# Patient Record
Sex: FEMALE | Race: WHITE | NOT HISPANIC OR LATINO | Employment: OTHER | URBAN - METROPOLITAN AREA
[De-identification: names, ages, dates, MRNs, and addresses within clinical notes are randomized per-mention and may not be internally consistent; named-entity substitution may affect disease eponyms.]

---

## 2018-01-09 ENCOUNTER — OFFICE VISIT CONVERTED (OUTPATIENT)
Dept: FAMILY MEDICINE CLINIC | Facility: CLINIC | Age: 63
End: 2018-01-09
Attending: NURSE PRACTITIONER

## 2018-02-12 ENCOUNTER — CONVERSION ENCOUNTER (OUTPATIENT)
Dept: FAMILY MEDICINE CLINIC | Facility: CLINIC | Age: 63
End: 2018-02-12

## 2018-02-12 ENCOUNTER — OFFICE VISIT CONVERTED (OUTPATIENT)
Dept: FAMILY MEDICINE CLINIC | Facility: CLINIC | Age: 63
End: 2018-02-12
Attending: FAMILY MEDICINE

## 2018-03-21 ENCOUNTER — CONVERSION ENCOUNTER (OUTPATIENT)
Dept: GENERAL RADIOLOGY | Facility: HOSPITAL | Age: 63
End: 2018-03-21

## 2018-07-03 ENCOUNTER — CONVERSION ENCOUNTER (OUTPATIENT)
Dept: FAMILY MEDICINE CLINIC | Facility: CLINIC | Age: 63
End: 2018-07-03

## 2018-07-03 ENCOUNTER — OFFICE VISIT CONVERTED (OUTPATIENT)
Dept: FAMILY MEDICINE CLINIC | Facility: CLINIC | Age: 63
End: 2018-07-03
Attending: FAMILY MEDICINE

## 2018-07-17 ENCOUNTER — OFFICE VISIT CONVERTED (OUTPATIENT)
Dept: FAMILY MEDICINE CLINIC | Facility: CLINIC | Age: 63
End: 2018-07-17
Attending: FAMILY MEDICINE

## 2018-07-23 ENCOUNTER — OFFICE VISIT CONVERTED (OUTPATIENT)
Dept: NEUROLOGY | Facility: CLINIC | Age: 63
End: 2018-07-23
Attending: PSYCHIATRY & NEUROLOGY

## 2018-10-15 ENCOUNTER — OFFICE VISIT CONVERTED (OUTPATIENT)
Dept: FAMILY MEDICINE CLINIC | Facility: CLINIC | Age: 63
End: 2018-10-15
Attending: FAMILY MEDICINE

## 2018-12-10 ENCOUNTER — OFFICE VISIT CONVERTED (OUTPATIENT)
Dept: FAMILY MEDICINE CLINIC | Facility: CLINIC | Age: 63
End: 2018-12-10
Attending: FAMILY MEDICINE

## 2019-02-12 ENCOUNTER — HOSPITAL ENCOUNTER (OUTPATIENT)
Dept: GENERAL RADIOLOGY | Facility: HOSPITAL | Age: 64
Discharge: HOME | End: 2019-02-12
Attending: FAMILY MEDICINE

## 2019-02-12 ENCOUNTER — OFFICE VISIT CONVERTED (OUTPATIENT)
Dept: FAMILY MEDICINE CLINIC | Facility: CLINIC | Age: 64
End: 2019-02-12
Attending: FAMILY MEDICINE

## 2019-04-16 ENCOUNTER — OFFICE VISIT CONVERTED (OUTPATIENT)
Dept: FAMILY MEDICINE CLINIC | Facility: CLINIC | Age: 64
End: 2019-04-16
Attending: FAMILY MEDICINE

## 2019-04-16 ENCOUNTER — CONVERSION ENCOUNTER (OUTPATIENT)
Dept: FAMILY MEDICINE CLINIC | Facility: CLINIC | Age: 64
End: 2019-04-16

## 2019-05-14 ENCOUNTER — HOSPITAL ENCOUNTER (OUTPATIENT)
Dept: URGENT CARE | Facility: CLINIC | Age: 64
Discharge: HOME | End: 2019-05-14
Attending: NURSE PRACTITIONER

## 2019-07-16 ENCOUNTER — HOSPITAL ENCOUNTER (OUTPATIENT)
Dept: LAB | Facility: HOSPITAL | Age: 64
Discharge: HOME | End: 2019-07-16
Attending: FAMILY MEDICINE

## 2019-07-16 ENCOUNTER — CONVERSION ENCOUNTER (OUTPATIENT)
Dept: FAMILY MEDICINE CLINIC | Facility: CLINIC | Age: 64
End: 2019-07-16

## 2019-07-16 ENCOUNTER — OFFICE VISIT CONVERTED (OUTPATIENT)
Dept: FAMILY MEDICINE CLINIC | Facility: CLINIC | Age: 64
End: 2019-07-16
Attending: FAMILY MEDICINE

## 2019-07-16 LAB
ALBUMIN SERPL-MCNC: 4.1 G/DL (ref 3.5–5)
ALBUMIN/GLOB SERPL: 1.3 {RATIO} (ref 1.4–2.6)
ALP SERPL-CCNC: 64 U/L (ref 43–160)
ALT SERPL-CCNC: 16 U/L (ref 10–40)
ANION GAP SERPL CALC-SCNC: 14 MMOL/L (ref 8–19)
AST SERPL-CCNC: 19 U/L (ref 15–50)
BILIRUB SERPL-MCNC: 0.24 MG/DL (ref 0.2–1.3)
BUN SERPL-MCNC: 16 MG/DL (ref 5–25)
BUN/CREAT SERPL: 16 {RATIO} (ref 6–20)
CALCIUM SERPL-MCNC: 9.2 MG/DL (ref 8.7–10.4)
CHLORIDE SERPL-SCNC: 102 MMOL/L (ref 99–111)
CONV CO2: 29 MMOL/L (ref 22–32)
CONV TOTAL PROTEIN: 7.2 G/DL (ref 6.3–8.2)
CREAT UR-MCNC: 1.01 MG/DL (ref 0.5–0.9)
GFR SERPLBLD BASED ON 1.73 SQ M-ARVRAT: 58 ML/MIN/{1.73_M2}
GLOBULIN UR ELPH-MCNC: 3.1 G/DL (ref 2–3.5)
GLUCOSE SERPL-MCNC: 90 MG/DL (ref 65–99)
OSMOLALITY SERPL CALC.SUM OF ELEC: 293 MOSM/KG (ref 273–304)
POTASSIUM SERPL-SCNC: 4.3 MMOL/L (ref 3.5–5.3)
SODIUM SERPL-SCNC: 141 MMOL/L (ref 135–147)
TSH SERPL-ACNC: 0.91 M[IU]/L (ref 0.27–4.2)

## 2019-10-11 ENCOUNTER — OFFICE VISIT CONVERTED (OUTPATIENT)
Dept: FAMILY MEDICINE CLINIC | Facility: CLINIC | Age: 64
End: 2019-10-11
Attending: INTERNAL MEDICINE

## 2019-10-14 ENCOUNTER — CONVERSION ENCOUNTER (OUTPATIENT)
Dept: FAMILY MEDICINE CLINIC | Facility: CLINIC | Age: 64
End: 2019-10-14

## 2019-10-14 ENCOUNTER — OFFICE VISIT CONVERTED (OUTPATIENT)
Dept: FAMILY MEDICINE CLINIC | Facility: CLINIC | Age: 64
End: 2019-10-14
Attending: NURSE PRACTITIONER

## 2019-10-22 ENCOUNTER — OFFICE VISIT CONVERTED (OUTPATIENT)
Dept: FAMILY MEDICINE CLINIC | Facility: CLINIC | Age: 64
End: 2019-10-22
Attending: INTERNAL MEDICINE

## 2019-10-24 ENCOUNTER — HOSPITAL ENCOUNTER (OUTPATIENT)
Dept: LAB | Facility: HOSPITAL | Age: 64
Discharge: HOME | End: 2019-10-24
Attending: INTERNAL MEDICINE

## 2019-10-24 LAB
BASOPHILS # BLD AUTO: 0.04 10*3/UL (ref 0–0.2)
BASOPHILS NFR BLD AUTO: 0.4 % (ref 0–3)
CONV ABS IMM GRAN: 0.05 10*3/UL (ref 0–0.2)
CONV IMMATURE GRAN: 0.5 % (ref 0–1.8)
DEPRECATED RDW RBC AUTO: 47.8 FL (ref 36.4–46.3)
EOSINOPHIL # BLD AUTO: 0.01 10*3/UL (ref 0–0.7)
EOSINOPHIL # BLD AUTO: 0.1 % (ref 0–7)
ERYTHROCYTE [DISTWIDTH] IN BLOOD BY AUTOMATED COUNT: 13.1 % (ref 11.7–14.4)
FOLATE SERPL-MCNC: 5.5 NG/ML (ref 4.8–20)
HCT VFR BLD AUTO: 42.1 % (ref 37–47)
HGB BLD-MCNC: 13.3 G/DL (ref 12–16)
LYMPHOCYTES # BLD AUTO: 0.46 10*3/UL (ref 1–5)
LYMPHOCYTES NFR BLD AUTO: 4.9 % (ref 20–45)
MCH RBC QN AUTO: 31.7 PG (ref 27–31)
MCHC RBC AUTO-ENTMCNC: 31.6 G/DL (ref 33–37)
MCV RBC AUTO: 100.5 FL (ref 81–99)
MONOCYTES # BLD AUTO: 0.3 10*3/UL (ref 0.2–1.2)
MONOCYTES NFR BLD AUTO: 3.2 % (ref 3–10)
NEUTROPHILS # BLD AUTO: 8.62 10*3/UL (ref 2–8)
NEUTROPHILS NFR BLD AUTO: 90.9 % (ref 30–85)
NRBC CBCN: 0 % (ref 0–0.7)
PLATELET # BLD AUTO: 293 10*3/UL (ref 130–400)
PMV BLD AUTO: 9.9 FL (ref 9.4–12.3)
RBC # BLD AUTO: 4.19 10*6/UL (ref 4.2–5.4)
VIT B12 SERPL-MCNC: 355 PG/ML (ref 211–911)
WBC # BLD AUTO: 9.48 10*3/UL (ref 4.8–10.8)

## 2019-11-05 ENCOUNTER — CONVERSION ENCOUNTER (OUTPATIENT)
Dept: OTHER | Facility: HOSPITAL | Age: 64
End: 2019-11-05

## 2019-11-14 ENCOUNTER — CONVERSION ENCOUNTER (OUTPATIENT)
Dept: FAMILY MEDICINE CLINIC | Facility: CLINIC | Age: 64
End: 2019-11-14

## 2019-11-14 ENCOUNTER — OFFICE VISIT CONVERTED (OUTPATIENT)
Dept: FAMILY MEDICINE CLINIC | Facility: CLINIC | Age: 64
End: 2019-11-14
Attending: NURSE PRACTITIONER

## 2020-01-22 ENCOUNTER — HOSPITAL ENCOUNTER (OUTPATIENT)
Dept: LAB | Facility: HOSPITAL | Age: 65
Discharge: HOME | End: 2020-01-22
Attending: NURSE PRACTITIONER

## 2020-01-22 ENCOUNTER — OFFICE VISIT CONVERTED (OUTPATIENT)
Dept: FAMILY MEDICINE CLINIC | Facility: CLINIC | Age: 65
End: 2020-01-22
Attending: NURSE PRACTITIONER

## 2020-01-22 LAB
ALBUMIN SERPL-MCNC: 4.6 G/DL (ref 3.5–5)
ALBUMIN/GLOB SERPL: 1.4 {RATIO} (ref 1.4–2.6)
ALP SERPL-CCNC: 78 U/L (ref 43–160)
ALT SERPL-CCNC: 15 U/L (ref 10–40)
AMYLASE SERPL-CCNC: 38 U/L (ref 30–110)
ANION GAP SERPL CALC-SCNC: 20 MMOL/L (ref 8–19)
APPEARANCE UR: ABNORMAL
AST SERPL-CCNC: 21 U/L (ref 15–50)
BASOPHILS # BLD AUTO: 0.05 10*3/UL (ref 0–0.2)
BASOPHILS NFR BLD AUTO: 0.6 % (ref 0–3)
BILIRUB SERPL-MCNC: 0.55 MG/DL (ref 0.2–1.3)
BILIRUB UR QL: NEGATIVE
BUN SERPL-MCNC: 10 MG/DL (ref 5–25)
BUN/CREAT SERPL: 11 {RATIO} (ref 6–20)
CALCIUM SERPL-MCNC: 9.9 MG/DL (ref 8.7–10.4)
CASTS URNS QL MICRO: ABNORMAL /[LPF]
CHLORIDE SERPL-SCNC: 103 MMOL/L (ref 99–111)
CHOLEST SERPL-MCNC: 196 MG/DL (ref 107–200)
CHOLEST/HDLC SERPL: 3.3 {RATIO} (ref 3–6)
COLOR UR: ABNORMAL
CONV ABS IMM GRAN: 0.03 10*3/UL (ref 0–0.2)
CONV BACTERIA: ABNORMAL
CONV CO2: 25 MMOL/L (ref 22–32)
CONV COLLECTION SOURCE (UA): ABNORMAL
CONV CRYSTALS: ABNORMAL /[HPF]
CONV IMMATURE GRAN: 0.4 % (ref 0–1.8)
CONV TOTAL PROTEIN: 7.9 G/DL (ref 6.3–8.2)
CONV UROBILINOGEN IN URINE BY AUTOMATED TEST STRIP: 1 {EHRLICHU}/DL (ref 0.1–1)
CREAT UR-MCNC: 0.94 MG/DL (ref 0.5–0.9)
DEPRECATED RDW RBC AUTO: 47.6 FL (ref 36.4–46.3)
EOSINOPHIL # BLD AUTO: 0.05 10*3/UL (ref 0–0.7)
EOSINOPHIL # BLD AUTO: 0.6 % (ref 0–7)
ERYTHROCYTE [DISTWIDTH] IN BLOOD BY AUTOMATED COUNT: 13.2 % (ref 11.7–14.4)
GFR SERPLBLD BASED ON 1.73 SQ M-ARVRAT: >60 ML/MIN/{1.73_M2}
GLOBULIN UR ELPH-MCNC: 3.3 G/DL (ref 2–3.5)
GLUCOSE SERPL-MCNC: 104 MG/DL (ref 65–99)
GLUCOSE UR QL: NEGATIVE MG/DL
HCT VFR BLD AUTO: 44 % (ref 37–47)
HDLC SERPL-MCNC: 60 MG/DL (ref 40–60)
HGB BLD-MCNC: 14.3 G/DL (ref 12–16)
HGB UR QL STRIP: NEGATIVE
KETONES UR QL STRIP: NEGATIVE MG/DL
LDLC SERPL CALC-MCNC: 109 MG/DL (ref 70–100)
LEUKOCYTE ESTERASE UR QL STRIP: NEGATIVE
LIPASE SERPL-CCNC: 20 U/L (ref 5–51)
LYMPHOCYTES # BLD AUTO: 1.31 10*3/UL (ref 1–5)
LYMPHOCYTES NFR BLD AUTO: 16.8 % (ref 20–45)
MCH RBC QN AUTO: 31.8 PG (ref 27–31)
MCHC RBC AUTO-ENTMCNC: 32.5 G/DL (ref 33–37)
MCV RBC AUTO: 97.8 FL (ref 81–99)
MONOCYTES # BLD AUTO: 0.6 10*3/UL (ref 0.2–1.2)
MONOCYTES NFR BLD AUTO: 7.7 % (ref 3–10)
NEUTROPHILS # BLD AUTO: 5.75 10*3/UL (ref 2–8)
NEUTROPHILS NFR BLD AUTO: 73.9 % (ref 30–85)
NITRITE UR QL STRIP: NEGATIVE
NRBC CBCN: 0 % (ref 0–0.7)
OSMOLALITY SERPL CALC.SUM OF ELEC: 299 MOSM/KG (ref 273–304)
PH UR STRIP.AUTO: 5.5 [PH] (ref 5–8)
PLATELET # BLD AUTO: 251 10*3/UL (ref 130–400)
PMV BLD AUTO: 10.3 FL (ref 9.4–12.3)
POTASSIUM SERPL-SCNC: 3.2 MMOL/L (ref 3.5–5.3)
PROT UR QL: ABNORMAL MG/DL
RBC # BLD AUTO: 4.5 10*6/UL (ref 4.2–5.4)
RBC #/AREA URNS HPF: ABNORMAL /[HPF]
SODIUM SERPL-SCNC: 145 MMOL/L (ref 135–147)
SP GR UR: 1.02 (ref 1–1.03)
SQUAMOUS SPT QL MICRO: ABNORMAL /[HPF]
TRIGL SERPL-MCNC: 135 MG/DL (ref 40–150)
TSH SERPL-ACNC: 0.47 M[IU]/L (ref 0.27–4.2)
VLDLC SERPL-MCNC: 27 MG/DL (ref 5–37)
WBC # BLD AUTO: 7.79 10*3/UL (ref 4.8–10.8)
WBC #/AREA URNS HPF: ABNORMAL /[HPF]

## 2020-01-23 ENCOUNTER — HOSPITAL ENCOUNTER (OUTPATIENT)
Dept: LAB | Facility: HOSPITAL | Age: 65
Discharge: HOME | End: 2020-01-23
Attending: NURSE PRACTITIONER

## 2020-01-23 LAB
C DIFF TOX B STL QL CT TISS CULT: NEGATIVE
CONV 027 TOXIN: NEGATIVE
CONV HEPATITIS B SURFACE AG W CONFIRMATION RE: NEGATIVE
HAV IGM SERPL QL IA: NEGATIVE
HBV CORE IGM SERPL QL IA: NEGATIVE
HCV AB SER DONR QL: <0.1 S/CO RATIO (ref 0–0.9)

## 2020-01-25 LAB
BACTERIA SPEC AEROBE CULT: NORMAL
CONV CELIAC DISEASE AB-IGA: 230 MG/DL (ref 68–408)
TTG IGA SER-ACNC: 1 U/ML (ref 0–3)

## 2020-01-28 ENCOUNTER — HOSPITAL ENCOUNTER (OUTPATIENT)
Dept: CT IMAGING | Facility: HOSPITAL | Age: 65
Discharge: HOME | End: 2020-01-28
Attending: NURSE PRACTITIONER

## 2020-02-13 ENCOUNTER — HOSPITAL ENCOUNTER (OUTPATIENT)
Dept: FAMILY MEDICINE CLINIC | Facility: CLINIC | Age: 65
Discharge: HOME | End: 2020-02-13
Attending: NURSE PRACTITIONER

## 2020-02-15 LAB — BACTERIA UR CULT: NORMAL

## 2020-02-24 ENCOUNTER — CONVERSION ENCOUNTER (OUTPATIENT)
Dept: FAMILY MEDICINE CLINIC | Facility: CLINIC | Age: 65
End: 2020-02-24

## 2020-02-24 ENCOUNTER — OFFICE VISIT CONVERTED (OUTPATIENT)
Dept: FAMILY MEDICINE CLINIC | Facility: CLINIC | Age: 65
End: 2020-02-24
Attending: NURSE PRACTITIONER

## 2020-02-25 ENCOUNTER — OFFICE VISIT CONVERTED (OUTPATIENT)
Dept: SURGERY | Facility: CLINIC | Age: 65
End: 2020-02-25
Attending: NURSE PRACTITIONER

## 2020-02-25 ENCOUNTER — HOSPITAL ENCOUNTER (OUTPATIENT)
Dept: SURGERY | Facility: CLINIC | Age: 65
Discharge: HOME | End: 2020-02-25
Attending: NURSE PRACTITIONER

## 2020-02-25 LAB
APPEARANCE UR: ABNORMAL
BILIRUB UR QL: NEGATIVE
COLOR UR: ABNORMAL
CONV BACTERIA: ABNORMAL
CONV COLLECTION SOURCE (UA): ABNORMAL
CONV CRYSTALS: ABNORMAL /[HPF]
CONV HYALINE CASTS IN URINE MICRO: ABNORMAL /[LPF]
CONV UROBILINOGEN IN URINE BY AUTOMATED TEST STRIP: 1 {EHRLICHU}/DL (ref 0.1–1)
GLUCOSE UR QL: NEGATIVE MG/DL
HGB UR QL STRIP: NEGATIVE
KETONES UR QL STRIP: NEGATIVE MG/DL
LEUKOCYTE ESTERASE UR QL STRIP: ABNORMAL
NITRITE UR QL STRIP: NEGATIVE
PH UR STRIP.AUTO: 5.5 [PH] (ref 5–8)
PROT UR QL: NEGATIVE MG/DL
RBC #/AREA URNS HPF: ABNORMAL /[HPF]
SP GR UR: 1.02 (ref 1–1.03)
SQUAMOUS SPT QL MICRO: ABNORMAL /[HPF]
WBC #/AREA URNS HPF: ABNORMAL /[HPF]

## 2020-02-26 LAB — BACTERIA UR CULT: NORMAL

## 2020-03-12 ENCOUNTER — HOSPITAL ENCOUNTER (OUTPATIENT)
Dept: FAMILY MEDICINE CLINIC | Facility: CLINIC | Age: 65
Discharge: HOME | End: 2020-03-12
Attending: NURSE PRACTITIONER

## 2020-03-30 ENCOUNTER — TELEMEDICINE CONVERTED (OUTPATIENT)
Dept: FAMILY MEDICINE CLINIC | Facility: CLINIC | Age: 65
End: 2020-03-30
Attending: NURSE PRACTITIONER

## 2020-04-15 LAB — FUNGUS CSF CULT: NORMAL

## 2020-05-28 ENCOUNTER — PROCEDURE VISIT CONVERTED (OUTPATIENT)
Dept: UROLOGY | Facility: CLINIC | Age: 65
End: 2020-05-28
Attending: UROLOGY

## 2020-06-11 ENCOUNTER — HOSPITAL ENCOUNTER (OUTPATIENT)
Dept: GASTROENTEROLOGY | Facility: HOSPITAL | Age: 65
Setting detail: HOSPITAL OUTPATIENT SURGERY
Discharge: HOME | End: 2020-06-11
Attending: SURGERY

## 2020-06-12 LAB — SARS-COV-2 RNA SPEC QL NAA+PROBE: NOT DETECTED

## 2020-06-15 ENCOUNTER — HOSPITAL ENCOUNTER (OUTPATIENT)
Dept: GASTROENTEROLOGY | Facility: HOSPITAL | Age: 65
Setting detail: HOSPITAL OUTPATIENT SURGERY
Discharge: HOME | End: 2020-06-15
Attending: SURGERY

## 2020-07-08 ENCOUNTER — CONVERSION ENCOUNTER (OUTPATIENT)
Dept: FAMILY MEDICINE CLINIC | Facility: CLINIC | Age: 65
End: 2020-07-08

## 2020-07-08 ENCOUNTER — OFFICE VISIT CONVERTED (OUTPATIENT)
Dept: FAMILY MEDICINE CLINIC | Facility: CLINIC | Age: 65
End: 2020-07-08
Attending: NURSE PRACTITIONER

## 2020-10-29 ENCOUNTER — HOSPITAL ENCOUNTER (OUTPATIENT)
Dept: FAMILY MEDICINE CLINIC | Facility: CLINIC | Age: 65
Discharge: HOME | End: 2020-10-29
Attending: NURSE PRACTITIONER

## 2020-10-29 ENCOUNTER — OFFICE VISIT CONVERTED (OUTPATIENT)
Dept: FAMILY MEDICINE CLINIC | Facility: CLINIC | Age: 65
End: 2020-10-29
Attending: NURSE PRACTITIONER

## 2020-10-30 ENCOUNTER — HOSPITAL ENCOUNTER (OUTPATIENT)
Dept: LAB | Facility: HOSPITAL | Age: 65
Discharge: HOME | End: 2020-10-30
Attending: NURSE PRACTITIONER

## 2020-10-30 LAB
ALBUMIN SERPL-MCNC: 4.2 G/DL (ref 3.5–5)
ALBUMIN/GLOB SERPL: 1.3 {RATIO} (ref 1.4–2.6)
ALP SERPL-CCNC: 74 U/L (ref 43–160)
ALT SERPL-CCNC: 11 U/L (ref 10–40)
ANION GAP SERPL CALC-SCNC: 15 MMOL/L (ref 8–19)
AST SERPL-CCNC: 21 U/L (ref 15–50)
BASOPHILS # BLD AUTO: 0.05 10*3/UL (ref 0–0.2)
BASOPHILS NFR BLD AUTO: 0.7 % (ref 0–3)
BILIRUB SERPL-MCNC: 0.34 MG/DL (ref 0.2–1.3)
BUN SERPL-MCNC: 22 MG/DL (ref 5–25)
BUN/CREAT SERPL: 19 {RATIO} (ref 6–20)
CALCIUM SERPL-MCNC: 9.4 MG/DL (ref 8.7–10.4)
CHLORIDE SERPL-SCNC: 104 MMOL/L (ref 99–111)
CHOLEST SERPL-MCNC: 204 MG/DL (ref 107–200)
CHOLEST/HDLC SERPL: 2.3 {RATIO} (ref 3–6)
CONV ABS IMM GRAN: 0.03 10*3/UL (ref 0–0.2)
CONV CO2: 26 MMOL/L (ref 22–32)
CONV IMMATURE GRAN: 0.4 % (ref 0–1.8)
CONV TOTAL PROTEIN: 7.4 G/DL (ref 6.3–8.2)
CREAT UR-MCNC: 1.17 MG/DL (ref 0.5–0.9)
DEPRECATED RDW RBC AUTO: 47.8 FL (ref 36.4–46.3)
EOSINOPHIL # BLD AUTO: 0.1 10*3/UL (ref 0–0.7)
EOSINOPHIL # BLD AUTO: 1.4 % (ref 0–7)
ERYTHROCYTE [DISTWIDTH] IN BLOOD BY AUTOMATED COUNT: 13.1 % (ref 11.7–14.4)
GFR SERPLBLD BASED ON 1.73 SQ M-ARVRAT: 48 ML/MIN/{1.73_M2}
GLOBULIN UR ELPH-MCNC: 3.2 G/DL (ref 2–3.5)
GLUCOSE SERPL-MCNC: 87 MG/DL (ref 65–99)
HCT VFR BLD AUTO: 44.8 % (ref 37–47)
HDLC SERPL-MCNC: 87 MG/DL (ref 40–60)
HGB BLD-MCNC: 14.3 G/DL (ref 12–16)
LDLC SERPL CALC-MCNC: 89 MG/DL (ref 70–100)
LYMPHOCYTES # BLD AUTO: 1.03 10*3/UL (ref 1–5)
LYMPHOCYTES NFR BLD AUTO: 14.1 % (ref 20–45)
MCH RBC QN AUTO: 31.7 PG (ref 27–31)
MCHC RBC AUTO-ENTMCNC: 31.9 G/DL (ref 33–37)
MCV RBC AUTO: 99.3 FL (ref 81–99)
MONOCYTES # BLD AUTO: 0.52 10*3/UL (ref 0.2–1.2)
MONOCYTES NFR BLD AUTO: 7.1 % (ref 3–10)
NEUTROPHILS # BLD AUTO: 5.57 10*3/UL (ref 2–8)
NEUTROPHILS NFR BLD AUTO: 76.3 % (ref 30–85)
NRBC CBCN: 0 % (ref 0–0.7)
OSMOLALITY SERPL CALC.SUM OF ELEC: 295 MOSM/KG (ref 273–304)
PLATELET # BLD AUTO: 271 10*3/UL (ref 130–400)
PMV BLD AUTO: 9.2 FL (ref 9.4–12.3)
POTASSIUM SERPL-SCNC: 4.4 MMOL/L (ref 3.5–5.3)
RBC # BLD AUTO: 4.51 10*6/UL (ref 4.2–5.4)
SODIUM SERPL-SCNC: 141 MMOL/L (ref 135–147)
TRIGL SERPL-MCNC: 141 MG/DL (ref 40–150)
TSH SERPL-ACNC: 1.05 M[IU]/L (ref 0.27–4.2)
VLDLC SERPL-MCNC: 28 MG/DL (ref 5–37)
WBC # BLD AUTO: 7.3 10*3/UL (ref 4.8–10.8)

## 2020-10-31 LAB
25(OH)D3 SERPL-MCNC: 23.5 NG/ML (ref 30–100)
BACTERIA UR CULT: NORMAL
VIT B12 SERPL-MCNC: 331 PG/ML (ref 211–911)

## 2020-11-09 ENCOUNTER — HOSPITAL ENCOUNTER (OUTPATIENT)
Dept: FAMILY MEDICINE CLINIC | Facility: CLINIC | Age: 65
Discharge: HOME | End: 2020-11-09
Attending: NURSE PRACTITIONER

## 2020-11-11 LAB
AMPICILLIN SUSC ISLT: >=32
AMPICILLIN+SULBAC SUSC ISLT: 8
BACTERIA UR CULT: ABNORMAL
CEFAZOLIN SUSC ISLT: <=4
CEFEPIME SUSC ISLT: <=0.12
CEFTAZIDIME SUSC ISLT: <=1
CEFTRIAXONE SUSC ISLT: <=0.25
CIPROFLOXACIN SUSC ISLT: <=0.25
ERTAPENEM SUSC ISLT: <=0.12
GENTAMICIN SUSC ISLT: <=1
LEVOFLOXACIN SUSC ISLT: <=0.12
NITROFURANTOIN SUSC ISLT: 128
PIP+TAZO SUSC ISLT: <=4
TMP SMX SUSC ISLT: <=20
TOBRAMYCIN SUSC ISLT: <=1

## 2020-12-11 ENCOUNTER — HOSPITAL ENCOUNTER (OUTPATIENT)
Dept: FAMILY MEDICINE CLINIC | Facility: CLINIC | Age: 65
Discharge: HOME | End: 2020-12-11
Attending: NURSE PRACTITIONER

## 2020-12-11 LAB
APPEARANCE UR: ABNORMAL
BILIRUB UR QL: NEGATIVE
COLOR UR: YELLOW
CONV BACTERIA: NEGATIVE
CONV COLLECTION SOURCE (UA): ABNORMAL
CONV HYALINE CASTS IN URINE MICRO: ABNORMAL /[LPF]
CONV UROBILINOGEN IN URINE BY AUTOMATED TEST STRIP: 0.2 {EHRLICHU}/DL (ref 0.1–1)
GLUCOSE UR QL: NEGATIVE MG/DL
HGB UR QL STRIP: NEGATIVE
KETONES UR QL STRIP: NEGATIVE MG/DL
LEUKOCYTE ESTERASE UR QL STRIP: NEGATIVE
NITRITE UR QL STRIP: NEGATIVE
PH UR STRIP.AUTO: 6 [PH] (ref 5–8)
PROT UR QL: NEGATIVE MG/DL
RBC #/AREA URNS HPF: ABNORMAL /[HPF]
SP GR UR: 1.02 (ref 1–1.03)
SQUAMOUS SPT QL MICRO: ABNORMAL /[HPF]
WBC #/AREA URNS HPF: ABNORMAL /[HPF]

## 2020-12-13 LAB — BACTERIA UR CULT: NORMAL

## 2021-01-08 ENCOUNTER — CONVERSION ENCOUNTER (OUTPATIENT)
Dept: FAMILY MEDICINE CLINIC | Facility: CLINIC | Age: 66
End: 2021-01-08

## 2021-01-08 ENCOUNTER — OFFICE VISIT CONVERTED (OUTPATIENT)
Dept: FAMILY MEDICINE CLINIC | Facility: CLINIC | Age: 66
End: 2021-01-08
Attending: NURSE PRACTITIONER

## 2021-03-10 ENCOUNTER — HOSPITAL ENCOUNTER (OUTPATIENT)
Dept: VACCINE CLINIC | Facility: HOSPITAL | Age: 66
Discharge: HOME | End: 2021-03-10
Attending: INTERNAL MEDICINE

## 2021-04-01 ENCOUNTER — HOSPITAL ENCOUNTER (OUTPATIENT)
Dept: VACCINE CLINIC | Facility: HOSPITAL | Age: 66
Discharge: HOME | End: 2021-04-01
Attending: INTERNAL MEDICINE

## 2021-05-10 NOTE — PROCEDURES
Procedure Note      Patient Name: Marissa Rob   Patient ID: [id number]   Sex: Female   YOB: 1955    Primary Care Provider: Desmond Gaffney MD   Referring Provider: Jenni Contreras MD    Visit Date: May 28, 2020    Provider: Isabell Sanchez MD   Location: [facility]   Location Address: [address] [place] [address]  [id number]   Location Phone: [phone]          Cystoscopy Procedure:  PROCEDURE: Flexible cystoscope was passed per urethra into the bladder without difficulty after proper consent. The bladder was inspected in a systematic meridian fashion. There were no tumors, lesions, stones, or other abnormalities noted within the bladder except for some mild diffuse inflammation. Both ureteral orifices were identified and were normal in appearance. The flexible cystoscope was removed. The patient tolerated the procedure well.   FOLLOW UP OFFICE NOTE: The CT scan of the Abdomen/Pelvis was abnormal. and She had circumferential bladder wall thickening but no other abnormalities were seen.           Assessment  · Bladder problem     596.9/N32.9  · Microhematuria     599.72/R31.29      Plan  · Orders  o Cystoscopy (73853) - 599.72/R31.29 - 05/28/2020  · Instructions  o We will follow up in one year or sooner if needed.  o TIME OUT PROCEDURE: Correct patient and birth date; Correct procedure; Correct Physician; Consent signed  o Her workup for hematuria is negative other than mild thickened bladder but no tumors were seen on cystoscopy. We discussed her bladder symptoms which are likely due to cognitive decline as well as OAB. She has failed Myrbetriq 25 and 50mg. They do not want to try anticholinergics due to cognitive issues.             Electronically Signed by: Isabell Sanchez MD -Author on May 28, 2020 11:51:19 AM

## 2021-05-12 NOTE — PROGRESS NOTES
Progress Note      Patient Name: Marissa Rob   Patient ID: [id number]   Sex: Female   YOB: 1955    Primary Care Provider: Desmond Gaffney MD   Referring Provider: Jenni Contreras MD    Visit Date: March 30, 2020    Provider: AUDREY Bob   Location: [facility]   Location Address: [address] [place] [address], [address]  [place]  [id number]   Location Phone: [phone]          Chief Complaint  · F/U       History Of Present Illness  Video Conferencing Visit  Marissa Rob is a 64 year old /White female who is presenting for evaluation via video conferencing. Verbal consent obtained before beginning visit.   The following staff were present during this visit: Vita Walker MA and Rabia VENTURA   Marissa Rob is a 64 year old /White female who presents for evaluation and treatment of:      Patient is here on video call for a follow up on weight loss.    wt loss and decreased appetite-she saw Sara Cabello on 2/25/20-she scheduled an EGD/Colonoscopy on 5/4/20 @08:00am. Her mother has a hx of stomach cancer. Reports she is doing really well, her appetite has increased. Reports for lunch today she had a 500 calorie hamburger, fries and a coke. She has started snacking on cookines at time. States she mentally feels better. She seems to be more like herself, she is trying to help around the house doing laundry and cleaning. She performed a U/A at the visit as well which showed hematuria-she is scheduled for a cysto on 4/10/20 @09:30am    lung nodules-she has a consult with pulm on 6/11/20 @ 09:45am to discuss the pulmonary nodules found on CT. She does have a hx of pulmonary nodules but does not currently have pulmonary on board.    urinary freq-last visit gave samples of Mybertriq.  reports it has helped w/the frequency-she is now getting up twice nightly which is an improvement.    htn-reports b/p running around 130/60-65. He has been giving her 20mg  lisinopril. He is also giving her 1/2 Norvasc 5mg qd.     diarrhea-reports is has completely stopped about 6 wks ago-she had it for a year. The Lomotil was lifechanging for her. She is taking 2 tabs bid. Requests refill today.     25 min spent via [facility] on [employer]       Past Medical History  Disease Name Date Onset Notes   Arthritis --  --    Bladder problem --  --    Bone Density Screening REFUSED --    Cerebrovascular disease 06/11/2015 --    Dementia 07/23/2018 The patient reports a 3 year history of memory change. Since that time, her  notes that she has progressively worsened. She did have neuropsych testing back in 2015. I did review these today. She was noted to have evidence to suggest a vascular form of memory change. In addition, she was felt to have clinical depression. She was started on Aricept. This was recently switched to memantine. She is uncertain in regards to the current dose. Today, I will pursue a MOCA. I will check labs. I will request her records be sent for my review. I did personally review her MRI of the brain which did reveal a few scattered FLAIR hyperintensities. However, overall, this is relatively mild. I would be reluctant to attribute her memory issues to this. She is noted have atrophy of the bitemporal region was for suggest a component of Alzheimer's dementia. I will refer the patient for repeat neuropsych testing. I will titrate her memory and seen to 10 mg twice per day.   Hepatitis A --  --    Hypertension --  --    Hypomagnesemia 01/11/2016 --    Hypothyroidism 05/09/2015 --    Leg pain --  --    Medicare Annual Wellness Visit REFUSED --    Memory loss 05/09/2015 --    Muscle cramps --  --    Obsessive-compulsive disorders --  --    Postmenopausal syndrome 02/01/2017 --    Thyroid disease --  --    Vascular dementia 08/10/2015 --    Vascular disease, peripheral --  --    Vitamin D deficiency --  --          Past Surgical History  Procedure Name Date Notes   Back  surgery --  --    Bladder Repair 2000 --    Hysterectomy --  Partial   Laminectomy 1989 L5-S1   Tubal ligation 1979 --          Medication List  Name Date Started Instructions   amlodipine 5 mg oral tablet  take 1 tablet (5 mg) by oral route once daily   Aricept 23 mg oral tablet 03/30/2020 take 1 tablet (23 mg) by oral route once daily in the evening for 90 days   Benadryl 25 mg oral capsule 01/26/2020 take 2 capsules (50 mg) by oral route one hour prior to the procedure.   estradiol 0.025 mg/24 hr transdermal patch weekly 09/09/2019 apply 1 patch by transdermal route once weekly for 90 days   levothyroxine 50 mcg oral tablet 12/30/2019 TAKE ONE TABLET BY MOUTH DAILY   lisinopril 20 mg oral tablet 03/30/2020 take 1 tablet (20 mg) by oral route once daily for 90 days   Lomotil 2.5-0.025 mg oral tablet 04/02/2020 take 2 tablets (5 mg) by oral route 2 times per day   Namenda 5 mg oral tablet 11/14/2019 take 1 tablets (5 mg) by oral route once a day for a week, then 2 times per day if tolerated   prednisone 50 mg oral tablet 01/27/2020 take 1 tablet (50 mg) by oral route 13 hours prior to procedure then again 7 hours before procedure then again 1 hour prior to procedure.         Allergy List  Allergen Name Date Reaction Notes   Allergic to IV contrast --  rash and itching --    cefuroxime axetil --  --  --    Levaquin --  joint pain --    Pravachol --  muscle aches and cramping --          Family Medical History  Disease Name Relative/Age Notes   Stomach Neoplasm, Malignant Aunt/   --    Heart Disease Father/   --    Hypertension Father/  Mother/   --    Diabetes, unspecified type Father/   --    Lung neoplasm Brother/  Uncle/   --    Family history of cancer Father/  Mother/   Mother; Father   Family history of heart disease Father/   Father   Family history of diabetes mellitus Father/   Father         Reproductive History  Menstrual   Age Menarche: 11   Pregnancy Summary   Total Pregnancies: 2 Full Term: 2  Premature: 0   Ab Induced: 0 Ab Spontaneous: 0 Ectopics: 0   Multiples: 0 Livin         Social History  Finding Status Start/Stop Quantity Notes   Active but no formal exercise --  --/-- --  --    Alcohol Never --/-- --  2020 - 2020 - 2020 - 2019 - 2019 - does not drink    --  --/-- --  --    No known infection risk --  --/-- --  --    Retired --  --/-- --  --    Tobacco Never --/-- --  never smoker  2017 - never          Immunizations  NameDate Admin Mfg Trade Name Lot Number Route Inj VIS Given VIS Publication   Mktpobgoz90/15/2018 [facility] Fluzone Quadrivalent [id number]  RD 10/15/2018 2015   Comments: Pt tolerated well   Khwszuiii16/ [facility] Fluzone > 3 Years [id number]  RD 10/17/2017 2015   Comments: pt tolerated well   Dawiawyxd00/2015 [facility] Fluarix, quadrivalent, preservative free  NE NE 10/30/2015    Comments: [facility]   Mppaubcok28/2014 [facility] Fluarix, quadrivalent, preservative free [id number] NE NE 2015   Comments:    Ckrzfesal2313/17/2017 MSD PNEUMOVAX 23 [id number]  LD 10/17/2017 10/06/2009   Comments: pt tolerated well   Prevnar 131 [employer] PREVNAR 13 [id number] IM RD 10/17/2017 2013   Comments: kiley well         Review of Systems  · Constitutional  o Denies  o : fever, fatigue, weight loss, weight gain  · Cardiovascular  o Denies  o : lower extremity edema, claudication, chest pressure, palpitations  · Respiratory  o Denies  o : shortness of breath, wheezing, cough, hemoptysis, dyspnea on exertion  · Gastrointestinal  o Denies  o : nausea, vomiting, diarrhea, constipation, abdominal pain  · Genitourinary  o Admits  o : frequency  o Denies  o : urgency, dysuria, hematuria, incontinence, urinary retention      Physical Examination  · Constitutional  o Appearance  o : alert, in no acute distress, well developed, well-nourished  · Skin and Subcutaneous Tissue  o General Inspection  o : no rashes, normal skin color, warm and  dry  · Neurologic  o Mental Status Examination  o : alert and oriented to time, place, and person. Gait and Station: normal gait, able to stand without difficulty  · Psychiatric  o Judgement and Insight  o : judgment and insight intact  o Mood and Affect  o : normal mood and affect          Assessment  · Diarrhea     787.91/R19.7  resolved w/use of Lomotil-task sent to  for refill. She has a colonoscopy [facility] for 5/4/20.  · Essential hypertension     401.9/I10  blood pressure under good control with lisinopril and Norvasc-continue-refilled today.  · Memory loss     780.93/R41.3  · Vascular dementia     290.40/F01.50   reports she is doing much better from a mental standpoint she is more like herself-states she is cutting up and doing housework.  · Weight loss     783.21/R63.4  she is scheduled for a colonoscopy and EGD on 5/4/20 due to wt loss, decreased appetite and chronic diarrhea. Her last colonoscopy was in 2015 which showed internal hemorrhoids, otherwise normal. She had a 10lb wt loss w/no cause-stool studies were negative. CT neg. Family hx of stomach ca (mom). Her  reports her appetite and wt have increased and the diarrhea has ceased. Encouraged to keep appt w/ for EGD/Colonoscopy.  · Lung nodules     793.19/R91.8  hx of lung nodules not currently being followed by pulm-[facility] consult for 6/11/20.  · Hematuria     599.70/R31.9  hematuria found on U/A-she is scheduled for a cyst on 4/10/20.   · Overactive bladder     596.51/N32.81  started on mybertriq last visit due to urinary freq with neg U/A. Reports she is having less freq since starting mybertriq.      Plan  · Orders  o ANNAMARIE Report (KASPR) - - 03/30/2020  o ACO-39: Current medications updated and reviewed () - - 03/30/2020  o ACO-14: Influenza immunization administered or previously received () - - 03/30/2020  · Medications  o Medications have been Reconciled  o Transition of Care or Provider  Policy  · Instructions  o Patient advised to monitor blood pressure (B/P) at home and journal readings. Patient informed that a B/P reading at home of more than 130/80 is considered hypertension. For readings greater cncr724/90 or higher patient is advised to follow up in the office with readings for management. Patient advised to limit sodium intake.  o Take all medications as prescribed/directed.  o Patient was educated/instructed on their diagnosis, treatment and medications prior to discharge from the clinic today.  o Patient instructed to seek medical attention urgently for new or worsening symptoms.  o Call the office with any concerns or questions.  o 3 month follow up  · Disposition  o Call or Return if symptoms worsen or persist.            Electronically Signed by: AUDREY Bob -Author on April 3, 2020 11:06:10 AM

## 2021-05-13 NOTE — PROGRESS NOTES
Progress Note      Patient Name: Marissa Rob   Patient ID: [id number]   Sex: Female   YOB: 1955    Primary Care Provider: Rabia VENTURA   Referring Provider: Rabia VENTURA    Visit Date: October 29, 2020    Provider: AUDREY Bob   Location: [facility]   Location Address: [address] [place] [address], [address]  [place], [place]  [id number]   Location Phone: [phone]          Chief Complaint  · Annual Wellness Exam      History Of Present Illness  The patient is a 65 year old /White female who has come to this office for her Annual Wellness Visit.   Her Primary Care Provider is Rabia VENTURA. Her comprehensive Care Team list, including suppliers, has been updated on the Facesheet. Her medical/family history, height, weight, BMI, and blood pressure have been reviewed and are in the chart. The Health Risk Assessment has been completed and scanned in the chart.   Medications are listed in the medication list.   The active problem list includes: Arthritis, Bladder problem, Cerebrovascular disease, Dementia, Hypertension, Hypomagnesemia, Hypothyroidism, Memory loss, Microhematuria, Obsessive-compulsive disorders, Postmenopausal syndrome, Vascular dementia, and Vitamin D deficiency   The patient does not have a history of substance use.   Patient reports her diet is adequate.   The Mini-Cog has been administered and is scanned in chart. The results are positive. Her cognitive function is limited, including dementia, with the severity being moderate.   A hearing loss screen was completed today and the result is negative.   Patient has the following risk factors for depression: has had a significant change in health status. Patient completed the PHQ-9 today and it has been scanned in the chart. The total score is 5-9.   The Timed Up and Go screen was administered today and the result is negative.   The Gibbs Index of [place] in ADLs indicated moderate  impairment (score of 3-5).   A Falls Risk Assessment has been completed, including a review of home fall hazards and medication review.   Overall, the patient's functional ability is noted by this provider to be abnormal. Her level of safety is noted to be abnormal. Her balance/gait is within normal limits. There have been no falls in the past year. Patient-specific home safety recommendations have been reviewed and a copy has been given to patient.   She denies issues with leaking urine.   There are no additional risk factors identified.   Living Will/Advanced Directive previously executed but not in chart.   Personalized health advice was given to the patient and a written health screening schedule was established; see Plan for details.   Marissa Rob is a 65 year old /White female who presents for evaluation and treatment of:       Past Medical History  Disease Name Date Onset Notes   Arthritis --  --    Bladder problem --  --    Bone Density Screening REFUSED --    Cerebrovascular disease 06/11/2015 --    Dementia 07/23/2018 The patient reports a 3 year history of memory change. Since that time, her  notes that she has progressively worsened. She did have neuropsych testing back in 2015. I did review these today. She was noted to have evidence to suggest a vascular form of memory change. In addition, she was felt to have clinical depression. She was started on Aricept. This was recently switched to memantine. She is uncertain in regards to the current dose. Today, I will pursue a MOCA. I will check labs. I will request her records be sent for my review. I did personally review her MRI of the brain which did reveal a few scattered FLAIR hyperintensities. However, overall, this is relatively mild. I would be reluctant to attribute her memory issues to this. She is noted have atrophy of the bitemporal region was for suggest a component of Alzheimer's dementia. I will refer the patient for  repeat neuropsych testing. I will titrate her memory and seen to 10 mg twice per day.   Hepatitis A --  --    Hypertension --  --    Hypomagnesemia 01/11/2016 --    Hypothyroidism 05/09/2015 --    Leg pain --  --    Medicare Annual Wellness Visit REFUSED --    Memory loss 05/09/2015 --    Microhematuria --  --    Muscle cramps --  --    Obsessive-compulsive disorders --  --    Postmenopausal syndrome 02/01/2017 --    Thyroid disease --  --    Vascular dementia 08/10/2015 --    Vascular disease, peripheral --  --    Vitamin D deficiency --  --          Past Surgical History  Procedure Name Date Notes   Back surgery --  --    Bladder Repair 2000 --    Cystoscopy 5/28/20 --    Hysterectomy --  Partial   Laminectomy 1989 L5-S1   Tubal ligation 1979 --          Medication List  Name Date Started Instructions   Aricept 23 mg oral tablet 10/29/2020 take 1 tablet (23 mg) by oral route once daily in the evening for 90 days   estradiol 0.025 mg/24 hr transdermal patch weekly 10/21/2020 apply 1 patch by transdermal route once weekly for 90 days   hydroxyzine HCl 10 mg oral tablet 10/29/2020 take 1 tablet by oral route q hs   levothyroxine 50 mcg oral tablet 10/29/2020 TAKE ONE TABLET BY MOUTH DAILY   Lomotil 2.5-0.025 mg oral tablet 10/30/2020 take 2 tablets (5 mg) by oral route 2 times per day   Namenda 5 mg oral tablet 10/29/2020 take 1 tablets (5 mg) by oral route once a day for a week, then 2 times per day if tolerated         Allergy List  Allergen Name Date Reaction Notes   Allergic to IV contrast --  rash and itching --    cefuroxime axetil --  --  --    Levaquin --  joint pain --    Pravachol --  muscle aches and cramping --        Allergies Reconciled  Family Medical History  Disease Name Relative/Age Notes   Stomach Neoplasm, Malignant Aunt/   --    Heart Disease Father/   --    Hypertension Father/  Mother/   --    Diabetes, unspecified type Father/   --    Lung neoplasm Brother/  Uncle/   --    Family history of  cancer Father/  Mother/   Mother; Father   Family history of heart disease Father/   Father   Family history of diabetes mellitus Father/   Father         Reproductive History  Menstrual   Age Menarche: 11   Pregnancy Summary   Total Pregnancies: 2 Full Term: 2 Premature: 0   Ab Induced: 0 Ab Spontaneous: 0 Ectopics: 0   Multiples: 0 Livin         Social History  Finding Status Start/Stop Quantity Notes   Active but no formal exercise --  --/-- --  --    Alcohol Never --/-- --  10/29/2020 - 2020 - 2020 - 2020 - 2020 - 2019 - 2019 - does not drink    --  --/-- --  --    No known infection risk --  --/-- --  --    Retired --  --/-- --  --    Tobacco Never --/-- --  never smoker  2017 - never          Immunizations  NameDate Admin Mfg Trade Name Lot Number Route Inj VIS Given VIS Publication   Ragmwmflv16/2020 PMC Fluzone Quadrivalent [id number] IM RA 10/02/2020 08/15/2019   Comments:    Dnelodssx0922/17/2017 MSD PNEUMOVAX 23 [id number] IM LD 10/17/2017 10/06/2009   Comments: pt tolerated well   Prevnar 131 WAL PREVNAR 13 [id number] IM RD 10/17/2017 2013   Comments: kiley well         Review of Systems  · Constitutional  o Denies  o : fatigue, night sweats  · Eyes  o Denies  o : double vision, blurred vision  · HENT  o Denies  o : vertigo, recent head injury  · Breasts  o Denies  o : abnormal changes in breast size, additional breast symptoms except as noted in the HPI  · Cardiovascular  o Denies  o : chest pain, irregular heart beats  · Respiratory  o Denies  o : shortness of breath, productive cough  · Gastrointestinal  o Denies  o : nausea, vomiting  · Genitourinary  o Admits  o : urinary urgency  o Denies  o : dysuria, urinary retention  · Integument  o Denies  o : hair growth change, new skin lesions  · Neurologic  o Denies  o : altered mental status, seizures  · Musculoskeletal  o Denies  o : joint swelling, limitation of  "motion  · Endocrine  o Denies  o : cold intolerance, heat intolerance  · Psychiatric  o Admits  o : feeling confused  · Heme-Lymph  o Denies  o : petechiae, lymph node enlargement or tenderness  · Allergic-Immunologic  o Denies  o : frequent illnesses      Vitals  Date Time BP Position Site L\R Cuff Size HR RR TEMP (F) WT  HT  BMI kg/m2 BSA m2 O2 Sat FR L/min FiO2        10/29/2020 02:58 /67 Sitting    78 - R   138lbs 2oz 5'  8\" 21 1.73 97 %            Physical Examination  · Neck  o Thyroid  o : no thyomegaly, no palpabale masses   · Respiratory  o Auscultation of Lungs  o : normal breath sounds throughout, no wheeze, rhonchi, or crackles  · Cardiovascular  o Heart  o : Regular rate and rhythm, Normal S1,S2 , No cardiac murmers, No S3 or S4 gallop or rubs  · Skin and Subcutaneous Tissue  o General Inspection  o : no rashes, normal skin color, warm and dry  o Digits and Nails  o : no clubbing, cyanosis, deformities or edema present, normal appearing nails  · Neurologic  o Mental Status Examination  o : alert and oriented to time, place, and person. Gait and Station: normal gait, able to stand without difficulty  · Psychiatric  o Judgement and Insight  o : judgment and insight intact  o Mood and Affect  o : normal mood and affect          Results  · In-Office Procedures  o Lab procedure  § IOP - Urinalysis without Microscopy (Clinitek) [facility] (97256)   § Color Ur: Yellow   § Clarity Ur: Clear   § Glucose Ur Ql Strip: Negative   § Bilirub Ur Ql Strip: Negative   § Ketones Ur Ql Strip: Negative   § Sp Gr Ur Qn: 1.025   § Hgb Ur Ql Strip: Trace-Intact   § pH Ur-LsCnc: 6.5   § Prot Ur Ql Strip: Negative   § Urobilinogen Ur Strip-mCnc: 0.2 E.U./dL   § Nitrite Ur Ql Strip: Negative   § WBC Est Ur Ql Strip: Negative       Assessment  · Encounter for Medicare annual wellness exam     V70.0/Z00.00  · Screening for depression     V79.0/Z13.89  · Screening for alcoholism     V79.1/Z13.39  · Anxiety " disorder     300.00/F41.9  anxiety well controlled on hydroxyzine.   · Diarrhea     787.91/R19.7  well controlled on Lomotil-request refill today-task placed. [place] and UDS up to date. Diarrhea well controlled on Lomotil-wt gain of 16lbs since last visit-her appetite is back to normal and she feels better than she has in a long time.   · Hypothyroidism     244.9/E03.9  · Urinary urgency     788.63/R39.15  c/o urinary urgency. Denies dysuria, freq, back pain, N/V. U/A neg for nitrates or leukocytes. Positive for trace-intact blood. Sent urine off for culture.  · Dementia     294.20/F03.90  dementia stable at this time on Aricept and Namenda-she has trouble using the remote control and phone. She is no longer driving. She is able to obey simple commands. Her  is her caregiver.       Plan  · Orders  o Falls Risk Assessment Completed (3288F) - V70.0/Z00.00 - 10/29/2020  o Brief hearing screening (written) [facility] () - V70.0/Z00.00 - 10/29/2020  o Annual Wellness Visit-includes a Personalized Prevention Plan of Service (PPS), SUBSEQUENT VISIT (Medicare) () - V70.0/Z00.00 - 10/29/2020  o ACO-13: Fall Risk Screening with no falls in past year or only one fall without injury in the past year (1101F) - V70.0/Z00.00 - 10/29/2020  o Presence or absence of urinary incontinence assessed (BENJAMIN) (1090F) - V70.0/Z00.00 - 10/29/2020  o Annual depression screening using the PHQ-9 tool, 15 minutes (74329, ) - V79.0/Z13.89 - 10/29/2020  o ACO-18: Positive screen for clinical depression using a standardized tool and a follow-up plan documented () - V79.0/Z13.89 - 10/29/2020  o Annual alcohol screening using the AUDIT-C tool, 15 minutes [facility] (05916, ) - V79.1/Z13.39 - 10/29/2020  o Negative alcohol screening () - V79.1/Z13.39 - 10/29/2020  o ACO-39: Current medications updated and reviewed (1159F, ) - - 10/29/2020  o ACO-14: Influenza immunization administered or previously received [facility] () - -  10/29/2020  o ACO-15: Pneumococcal Vaccine Administered or Previously Received (4040F) - - 10/29/2020  o ACO-18: Positive screen for clinical depression using a standardized tool and a follow-up plan documented () - - 11/02/2020  o ACO-13: Fall Risk Screening with no falls in past year or only one fall without injury in the past year (1101F) - - 11/02/2020  o ACO - Pt declines to or was not able to provide an Advance Care Plan or name a Surrogate Decision Maker (1124F) - - 11/02/2020  o Urine Culture (Clean Catch) [facility] (04306) - 788.63/R39.15 - 10/29/2020  · Medications  o Lomotil 2.5-0.025 mg oral tablet   SIG: take 2 tablets (5 mg) by oral route 2 times per day   DISP: (120) Tablet with 2 refills  Adjusted on 10/30/2020     o Aricept 23 mg oral tablet   SIG: take 1 tablet (23 mg) by oral route once daily in the evening for 90 days   DISP: (90) Tablet with 1 refills  Refilled on 10/29/2020     o hydroxyzine HCl 10 mg oral tablet   SIG: take 1 tablet by oral route q hs   DISP: (90) Tablet with 1 refills  Refilled on 10/29/2020     o levothyroxine 50 mcg oral tablet   SIG: TAKE ONE TABLET BY MOUTH DAILY   DISP: (90) Tablet with 1 refills  Refilled on 10/29/2020     o Namenda 5 mg oral tablet   SIG: take 1 tablets (5 mg) by oral route once a day for a week, then 2 times per day if tolerated   DISP: (180) Tablet with 1 refills  Refilled on 10/29/2020     o Medications have been Reconciled  o Transition of Care or Provider Policy  · Instructions  o Health Risk Assessment has been reviewed with the patient.  o Written health screening schedule for next 5-10 years was established with patient; information scanned in chart and given/mailed to patient.  o Fall prevention methods discussed and a copy of recommendations given/mailed to patient.  o Depression Screen completed and scanned into the EMR under the designated folder within the patient's documents.  o Today's PHQ-9 result is 6.  o Audit-C score of 0-4 - Negative  Screen - Brief Discussion  o Take all medications as prescribed/directed.  o Patient was educated/instructed on their diagnosis, treatment and medications prior to discharge from the clinic today.  o Patient instructed to seek medical attention urgently for new or worsening symptoms.  o Call the office with any concerns or questions.  o 3 month follow up  o Electronically Identified Patient Education Materials Provided Electronically  · Disposition  o Call or Return if symptoms worsen or persist.            Electronically Signed by: AUDREY Bob -Author on November 2, 2020 07:52:48 PM

## 2021-05-13 NOTE — PROGRESS NOTES
Progress Note      Patient Name: Marissa Rob   Patient ID: [id number]   Sex: Female   YOB: 1955    Primary Care Provider: Rabia VENTURA   Referring Provider: Rabia VENTURA    Visit Date: July 8, 2020    Provider: AUDREY Bbo   Location: [facility]   Location Address: [address] [place] [address], [address]  [place]  [id number]   Location Phone: [phone]          Chief Complaint  · 3 MONTHS F/U      History Of Present Illness  Marissa Rob is a 65 year old /White female who presents for evaluation and treatment of:      Patient is here today for 3 months follow up.  says no c/c.    pt and reports she is eating great-states her appetitie has improved dramatically    states adding hydroxyzine has helped she is back to her old self-her overall outlook has dramatically improved    her dementia is stable    she has had a 6lb wt gain in 2 months.    she is not having any diarrhea she is taking Lomotil 2 tabs bid. She had a colonoscopy which only showed one polyp.       Past Medical History  Disease Name Date Onset Notes   Arthritis --  --    Bladder problem --  --    Bone Density Screening REFUSED --    Cerebrovascular disease 06/11/2015 --    Dementia 07/23/2018 The patient reports a 3 year history of memory change. Since that time, her  notes that she has progressively worsened. She did have neuropsych testing back in 2015. I did review these today. She was noted to have evidence to suggest a vascular form of memory change. In addition, she was felt to have clinical depression. She was started on Aricept. This was recently switched to memantine. She is uncertain in regards to the current dose. Today, I will pursue a MOCA. I will check labs. I will request her records be sent for my review. I did personally review her MRI of the brain which did reveal a few scattered FLAIR hyperintensities. However, overall, this is relatively mild. I would be reluctant  to attribute her memory issues to this. She is noted have atrophy of the bitemporal region was for suggest a component of Alzheimer's dementia. I will refer the patient for repeat neuropsych testing. I will titrate her memory and seen to 10 mg twice per day.   Hepatitis A --  --    Hypertension --  --    Hypomagnesemia 01/11/2016 --    Hypothyroidism 05/09/2015 --    Leg pain --  --    Medicare Annual Wellness Visit REFUSED --    Memory loss 05/09/2015 --    Microhematuria --  --    Muscle cramps --  --    Obsessive-compulsive disorders --  --    Postmenopausal syndrome 02/01/2017 --    Thyroid disease --  --    Vascular dementia 08/10/2015 --    Vascular disease, peripheral --  --    Vitamin D deficiency --  --          Past Surgical History  Procedure Name Date Notes   Back surgery --  --    Bladder Repair 2000 --    Cystoscopy 5/28/20 --    Hysterectomy --  Partial   Laminectomy 1989 L5-S1   Tubal ligation 1979 --          Medication List  Name Date Started Instructions   Aricept 23 mg oral tablet 07/08/2020 take 1 tablet (23 mg) by oral route once daily in the evening for 90 days   estradiol 0.025 mg/24 hr transdermal patch weekly 06/03/2020 apply 1 patch by transdermal route once weekly for 90 days   hydroxyzine HCl 10 mg oral tablet 07/08/2020 take 1 tablet by oral route q hs   levothyroxine 50 mcg oral tablet 07/08/2020 TAKE ONE TABLET BY MOUTH DAILY   Lomotil 2.5-0.025 mg oral tablet 06/03/2020 take 2 tablets (5 mg) by oral route 2 times per day   Namenda 5 mg oral tablet 07/08/2020 take 1 tablets (5 mg) by oral route once a day for a week, then 2 times per day if tolerated         Allergy List  Allergen Name Date Reaction Notes   Allergic to IV contrast --  rash and itching --    cefuroxime axetil --  --  --    Levaquin --  joint pain --    Pravachol --  muscle aches and cramping --        Allergies Reconciled  Family Medical History  Disease Name Relative/Age Notes   Stomach Neoplasm, Malignant Aunt/   --     Heart Disease Father/   --    Hypertension Father/  Mother/   --    Diabetes, unspecified type Father/   --    Lung neoplasm Brother/  Uncle/   --    Family history of cancer Father/  Mother/   Mother; Father   Family history of heart disease Father/   Father   Family history of diabetes mellitus Father/   Father         Reproductive History  Menstrual   Age Menarche: 11   Pregnancy Summary   Total Pregnancies: 2 Full Term: 2 Premature: 0   Ab Induced: 0 Ab Spontaneous: 0 Ectopics: 0   Multiples: 0 Livin         Social History  Finding Status Start/Stop Quantity Notes   Active but no formal exercise --  --/-- --  --    Alcohol Never --/-- --  2020 - 2020 - 2020 - 2020 - 2019 - 2019 - does not drink    --  --/-- --  --    No known infection risk --  --/-- --  --    Retired --  --/-- --  --    Tobacco Never --/-- --  never smoker  2017 - never          Immunizations  NameDate Admin Mfg Trade Name Lot Number Route Inj VIS Given VIS Publication   Gqhxhxpro57/15/2018 PMC Fluzone Quadrivalent [id number] IM RD 10/15/2018 2015   Comments: Pt tolerated well   Eqafjfbns39/ PMC Fluzone > 3 Years [id number] IM RD 10/17/2017 2015   Comments: pt tolerated well   Szebrrzgo64/2015 SKB Fluarix, quadrivalent, preservative free  NE NE 10/30/2015    Comments: [facility]   Bskwizrjm74/2014 SKB Fluarix, quadrivalent, preservative free [id number] NE NE 2015   Comments:    Urrlntbky2904/17/2017 MSD PNEUMOVAX 23 [id number] IM LD 10/17/2017 10/06/2009   Comments: pt tolerated well   Prevnar 131 WAL PREVNAR 13 [id number] IM RD 10/17/2017 2013   Comments: kiley well         Review of Systems  · Constitutional  o Denies  o : fever, fatigue, weight loss, weight gain  · Cardiovascular  o Denies  o : lower extremity edema, claudication, chest pressure, palpitations  · Respiratory  o Denies  o : shortness of breath, wheezing, cough, hemoptysis, dyspnea on  exertion  · Gastrointestinal  o Admits  o : diarrhea  o Denies  o : nausea, vomiting, constipation, abdominal pain  · Neurologic  o Admits  o : difficulty concentrating, memory difficulties      Vitals  Date Time BP Position Site L\R Cuff Size HR RR TEMP (F) WT  HT  BMI kg/m2 BSA m2 O2 Sat HC       07/08/2020 11:48 AM 97/37 Sitting    69 - R   122lbs 6oz    99 %          Physical Examination  · Constitutional  o Appearance  o : alert, in no acute distress, well developed, well-nourished  · Neck  o Thyroid  o : no thyomegaly, no palpabale masses   · Respiratory  o Auscultation of Lungs  o : normal breath sounds throughout, no wheeze, rhonchi, or crackles  · Cardiovascular  o Heart  o : Regular rate and rhythm, Normal S1,S2 , No cardiac murmers, No S3 or S4 gallop or rubs  · Skin and Subcutaneous Tissue  o General Inspection  o : no rashes, normal skin color, warm and dry  o Digits and Nails  o : no clubbing, cyanosis, deformities or edema present, normal appearing nails  · Neurologic  o Mental Status Examination  o : alert and oriented to time, place, and person. Gait and Station: normal gait, able to stand without difficulty  · Psychiatric  o Judgement and Insight  o : judgment and insight intact  o Mood and Affect  o : normal mood and affect          Assessment  · Diarrhea     787.91/R19.7  she had a egd/colonoscopy done by  last month due to wt loss, diarrhea and decreased appetite-the egd showed a hiatal hernia and the colonoscopy showed diverticulosis and polyp.  reports diarrhea under good control w/Lomotil.  · Fatigue     780.79/R53.83  · Hypothyroidism     244.9/E03.9  · Vitamin D deficiency     268.9/E55.9  · Screening for lipid disorders     V77.91/Z13.220  · Dementia     294.20/F03.90  dementia stable-reports her appetite has improved since starting hydroxyzine-she has gained 6lbs-mood improved as well. She states she feels better than she has in years. Her  reports her overall  outlook on life has improved-he is happy how well she is doing at this time.  · Postmenopausal syndrome     627.9/N95.1  · Arthritis     716.90/M19.90  · Routine lab draw     V72.60/Z01.89  · Hypotension     458.9/I95.9  discontinued amlodipine due to hypotension.       Plan  · Orders  o Physical, Primary Care Panel (CBC, CMP, Lipid, TSH) [facility] (62410, 71113, 29714, 49944) - 244.9/E03.9, V77.91/Z13.220, V72.60/Z01.89 - 01/08/2021  o Vitamin D (25-Hydroxy) Level (65688) - 268.9/E55.9 - 01/08/2021  o ACO-39: Current medications updated and reviewed () - - 07/08/2020  o ACO-14: Influenza immunization administered or previously received () - - 07/08/2020  o ACO-13: Fall Risk Screening with no falls in past year or only one fall without injury in the past year (1101F) - - 07/08/2020  o Vitamin B12 level (90733) - V72.60/Z01.89, 780.79/R53.83 - 01/08/2021  · Medications  o hydroxyzine HCl 10 mg oral tablet   SIG: take 1 tablet by oral route q hs   DISP: (90) tablets with 1 refills  Adjusted on 07/08/2020     o Aricept 23 mg oral tablet   SIG: take 1 tablet (23 mg) by oral route once daily in the evening for 90 days   DISP: (90) tablets with 1 refills  Refilled on 07/08/2020     o levothyroxine 50 mcg oral tablet   SIG: TAKE ONE TABLET BY MOUTH DAILY   DISP: (90) Tablet with 1 refills  Refilled on 07/08/2020     o Namenda 5 mg oral tablet   SIG: take 1 tablets (5 mg) by oral route once a day for a week, then 2 times per day if tolerated   DISP: (180) tablets with 1 refills  Refilled on 07/08/2020     o amlodipine 5 mg oral tablet   SIG: take 1 tablet (5 mg) by oral route once daily   DISP: (30) tablets with 0 refills  Discontinued on 07/08/2020     o Medications have been Reconciled  o Transition of Care or Provider Policy  · Instructions  o Take all medications as prescribed/directed.  o Patient was educated/instructed on their diagnosis, treatment and medications prior to discharge from the clinic  today.  o Patient instructed to seek medical attention urgently for new or worsening symptoms.  o Call the office with any concerns or questions.  o Discussed Covid-19 precautions including, but not limited to, social distancing, avoid touching your face, and hand washing.   o 6 month follow up  · Disposition  o Call or Return if symptoms worsen or persist.            Electronically Signed by: AUDREY Bob -Author on July 12, 2020 10:48:29 PM

## 2021-05-14 VITALS
HEART RATE: 78 BPM | HEIGHT: 68 IN | WEIGHT: 138.12 LBS | BODY MASS INDEX: 20.93 KG/M2 | OXYGEN SATURATION: 97 % | DIASTOLIC BLOOD PRESSURE: 67 MMHG | SYSTOLIC BLOOD PRESSURE: 138 MMHG

## 2021-05-14 VITALS
WEIGHT: 141.25 LBS | HEIGHT: 68 IN | SYSTOLIC BLOOD PRESSURE: 132 MMHG | OXYGEN SATURATION: 96 % | BODY MASS INDEX: 21.41 KG/M2 | DIASTOLIC BLOOD PRESSURE: 66 MMHG | HEART RATE: 65 BPM

## 2021-05-14 NOTE — PROGRESS NOTES
Progress Note      Patient Name: Marissa Rob   Patient ID: [id number]   Sex: Female   YOB: 1955    Primary Care Provider: Rabia VENTURA   Referring Provider: Rabia VENTURA    Visit Date: January 8, 2021    Provider: AUDREY Bob   [place] [facility] [place]   [place] Address: [address] [place] [address], [address]  [place], [place]  [id number]   Location Phone: [phone]          Chief Complaint  · 6 MONTHS F/U      History Of Present Illness  Marissa Rob is a 65 year old /White female who presents for evaluation and treatment of:      Patient is here today for 6 months follow up.    Patient's  states she is doing well on Myrbetriq and her BM are normal with the lomotil. They both concerned due the price of 400$ for it plus their deductible. Discussed since the Myrbetriq is working so well we will try to give her samples. Her  states she is no longer running to the bathroom every couple min and is even able to sit and watch a whole movie without running to the bathroom.       He states she eating good, sleeping good but her short term memory is getting worse. They are both aware that her memory is worsening-he is no longer able to leave her by herself because she is very dependent on him. She is able to shower by herself and dress herself but Jorge has to do all the cooking. She has to have multiple reminders when performing a task. Jorge is thrilled she is eating better and gained weight. She has done a complete turn around from this time last year as she went through a stage of delirium and was hospitalized. Her anxiety is well controlled. She is very pleasant and upbeat despite the memory loss. Her father had a hx of Alzheimer's Dementia.      says she seen Eye doctor and she will need a cataract surgery next year her L eye worse than R.       Past Medical History  Disease Name Date Onset Notes   Arthritis --  --    Bladder problem --   --    Bone Density Screening REFUSED --    Cerebrovascular disease 06/11/2015 --    Dementia 07/23/2018 The patient reports a 3 year history of memory change. Since that time, her  notes that she has progressively worsened. She did have neuropsych testing back in 2015. I did review these today. She was noted to have evidence to suggest a vascular form of memory change. In addition, she was felt to have clinical depression. She was started on Aricept. This was recently switched to memantine. She is uncertain in regards to the current dose. Today, I will pursue a MOCA. I will check labs. I will request her records be sent for my review. I did personally review her MRI of the brain which did reveal a few scattered FLAIR hyperintensities. However, overall, this is relatively mild. I would be reluctant to attribute her memory issues to this. She is noted have atrophy of the bitemporal region was for suggest a component of Alzheimer's dementia. I will refer the patient for repeat neuropsych testing. I will titrate her memory and seen to 10 mg twice per day.   Hepatitis A --  --    Hypertension --  --    Hypomagnesemia 01/11/2016 --    Hypothyroidism 05/09/2015 --    Leg pain --  --    Medicare Annual Wellness Visit REFUSED --    Memory loss 05/09/2015 --    Microhematuria --  --    Muscle cramps --  --    Obsessive-compulsive disorders --  --    Pap smear for cervical cancer screening NO LONGER --    Postmenopausal syndrome 02/01/2017 --    Screening Mammogram REFUSED --    Thyroid disease --  --    Vascular dementia 08/10/2015 --    Vascular disease, peripheral --  --    Vitamin D deficiency --  --          Past Surgical History  Procedure Name Date Notes   Back surgery --  --    Bladder Repair 2000 --    Colonoscopy 06/2020 POLYP   Cystoscopy 5/28/20 --    Hysterectomy --  Partial   Laminectomy 1989 L5-S1   Tubal ligation 1979 --          Medication List  Name Date Started Instructions   Aricept 23 mg oral  tablet 10/29/2020 take 1 tablet (23 mg) by oral route once daily in the evening for 90 days   estradiol 0.025 mg/24 hr transdermal patch weekly 10/21/2020 apply 1 patch by transdermal route once weekly for 90 days   hydroxyzine HCl 10 mg oral tablet 10/29/2020 take 1 tablet by oral route q hs   levothyroxine 50 mcg oral tablet 10/29/2020 TAKE ONE TABLET BY MOUTH DAILY   Lomotil 2.5-0.025 mg oral tablet 10/30/2020 take 2 tablets (5 mg) by oral route 2 times per day   Myrbetriq 50 mg oral tablet extended release 24 hr 2021 take 1 tablet (50 mg) by oral route once daily swallowing whole with water. Do not crush, chew and/or divide.   Namenda 5 mg oral tablet 10/29/2020 take 1 tablets (5 mg) by oral route once a day for a week, then 2 times per day if tolerated   Vitamin D2 1,250 mcg (50,000 unit) oral capsule 2021 take 1 capsule by oral route weekly         Allergy List  Allergen Name Date Reaction Notes   Allergic to IV contrast --  rash and itching --    cefuroxime axetil --  --  --    Levaquin --  joint pain --    Pravachol --  muscle aches and cramping --        Allergies Reconciled  Family Medical History  Disease Name Relative/Age Notes   Stomach Neoplasm, Malignant Aunt/   --    Heart Disease Father/   --    Hypertension Father/  Mother/   --    Diabetes, unspecified type Father/   --    Lung neoplasm Brother/  Uncle/   --    Family history of cancer Father/  Mother/   Mother; Father   Family history of heart disease Father/   Father   Family history of diabetes mellitus Father/   Father         Reproductive History  Menstrual   Age Menarche: 11   Pregnancy Summary   Total Pregnancies: 2 Full Term: 2 Premature: 0   Ab Induced: 0 Ab Spontaneous: 0 Ectopics: 0   Multiples: 0 Livin         Social History  Finding Status Start/Stop Quantity Notes   Active but no formal exercise --  --/-- --  --    Alcohol Never --/-- --  2021 - 10/29/2020 - 2020 - 2020 - 2020 - 2020 -  "11/14/2019 - 07/16/2019 - does not drink    --  --/-- --  --    No known infection risk --  --/-- --  --    Retired --  --/-- --  --    Tobacco Never --/-- --  never smoker  09/20/2017 - never          Immunizations  NameDate Admin Mfg Trade Name Lot Number Route Inj VIS Given VIS Publication   Mfnxkgzof87/02/2020 PMC Fluzone Quadrivalent [id number] IM RA 10/02/2020 08/15/2019   Comments:    Ismcsksfd3928/17/2017 MSD PNEUMOVAX 23 [id number] IM LD 10/17/2017 10/06/2009   Comments: pt tolerated well   Prevnar 1310/30/2015 WAL PREVNAR 13 [id number] IM RD 10/17/2017 02/27/2013   Comments: kiley well         Review of Systems  · Constitutional  o Denies  o : fever, fatigue, weight loss, weight gain  · Cardiovascular  o Denies  o : lower extremity edema, claudication, chest pressure, palpitations  · Respiratory  o Denies  o : shortness of breath, wheezing, cough, hemoptysis, dyspnea on exertion  · Gastrointestinal  o Denies  o : nausea, vomiting, diarrhea, constipation, abdominal pain  · Genitourinary  o Denies  o : urgency, frequency, dysuria, nocturia, incontinence, urinary retention, urinary hesitancy  · Neurologic  o Admits  o : difficulty concentrating, memory difficulties, smiles socially  o Denies  o : speech difficulties, staring spells  · Psychiatric  o Admits  o : anxiety, depression      Vitals  Date Time BP Position Site L\R Cuff Size HR RR TEMP (F) WT  HT  BMI kg/m2 BSA m2 O2 Sat FR L/min FiO2 HC       01/08/2021 11:37 /66 Sitting    65 - R   141lbs 4oz 5'  8\" 21.48 1.75 96 %            Physical Examination  · Constitutional  o Appearance  o : alert, in no acute distress, well developed, well-nourished  · Neck  o Thyroid  o : no thyomegaly, no palpabale masses   · Respiratory  o Auscultation of Lungs  o : normal breath sounds throughout, no wheeze, rhonchi, or crackles  · Cardiovascular  o Heart  o : Regular rate and rhythm, Normal S1,S2 , No cardiac murmers, No S3 or S4 gallop or rubs  · Skin and " Subcutaneous Tissue  o General Inspection  o : no rashes, normal skin color, warm and dry  o Digits and Nails  o : no clubbing, cyanosis, deformities or edema present, normal appearing nails  · Neurologic  o Mental Status Examination  o : alert and oriented to time, place, and person. Gait and Station: normal gait, able to stand without difficulty  · Psychiatric  o Judgement and Insight  o : judgment and insight intact  o Mood and Affect  o : normal mood and affect          Assessment  · Anxiety disorder     300.00/F41.9  · Diarrhea     787.91/R19.7  · Hypothyroidism     244.9/E03.9  · Vitamin D deficiency     268.9/E55.9  · Memory loss     780.93/R41.3  · Postmenopausal syndrome     627.9/N95.1  · Vascular dementia     290.40/F01.50  · Obsessive-compulsive disorders     300.3/F42.9  · Hypertension     401.9/I10  · Arthritis     716.90/M19.90  · OAB (overactive bladder)     596.51/N32.81      Plan  · Orders  o ACO-15: Pneumococcal Vaccine Administered or Previously Received [facility] (4040F) - - 01/08/2021  o ACO-14: Influenza immunization administered or previously received [facility] () - - 01/08/2021  o ACO-19: Colorectal cancer screening results documented and reviewed (3017F) - - 06/30/2020  o ACO-39: Current medications updated and reviewed (, 1159F) - - 01/08/2021  · Medications  o Myrbetriq 50 mg oral tablet extended release 24 hr   SIG: take 1 tablet (50 mg) by oral route once daily swallowing whole with water. Do not crush, chew and/or divide.   DISP: (30) Tablet with 0 refills  Adjusted on 01/08/2021     o Vitamin D2 1,250 mcg (50,000 unit) oral capsule   SIG: take 1 capsule by oral route weekly   DISP: (13) Capsule with 0 refills  Refilled on 01/08/2021     o Medications have been Reconciled  o Transition of Care or Provider Policy  · Instructions  o Take all medications as prescribed/directed.  o Patient was educated/instructed on their diagnosis, treatment and medications prior to discharge from the  clinic today.  o Patient instructed to seek medical attention urgently for new or worsening symptoms.  o Call the office with any concerns or questions.  o 6 month follow up  o Electronically Identified Patient Education Materials Provided Electronically  · Disposition  o Call or Return if symptoms worsen or persist.            Electronically Signed by: AUDREY Bob -Author on January 10, 2021 09:44:09 PM

## 2021-05-15 VITALS
DIASTOLIC BLOOD PRESSURE: 47 MMHG | SYSTOLIC BLOOD PRESSURE: 124 MMHG | WEIGHT: 134.5 LBS | BODY MASS INDEX: 20.38 KG/M2 | HEIGHT: 68 IN | OXYGEN SATURATION: 100 % | HEART RATE: 84 BPM

## 2021-05-15 VITALS
DIASTOLIC BLOOD PRESSURE: 49 MMHG | SYSTOLIC BLOOD PRESSURE: 128 MMHG | WEIGHT: 136.37 LBS | BODY MASS INDEX: 20.67 KG/M2 | HEIGHT: 68 IN | HEART RATE: 84 BPM | OXYGEN SATURATION: 98 %

## 2021-05-15 VITALS
BODY MASS INDEX: 18.81 KG/M2 | OXYGEN SATURATION: 97 % | HEIGHT: 68 IN | HEART RATE: 74 BPM | DIASTOLIC BLOOD PRESSURE: 52 MMHG | SYSTOLIC BLOOD PRESSURE: 105 MMHG | WEIGHT: 124.12 LBS

## 2021-05-15 VITALS
OXYGEN SATURATION: 98 % | DIASTOLIC BLOOD PRESSURE: 55 MMHG | WEIGHT: 152 LBS | HEART RATE: 66 BPM | BODY MASS INDEX: 23.04 KG/M2 | HEIGHT: 68 IN | SYSTOLIC BLOOD PRESSURE: 110 MMHG

## 2021-05-15 VITALS
OXYGEN SATURATION: 98 % | SYSTOLIC BLOOD PRESSURE: 97 MMHG | WEIGHT: 154.5 LBS | BODY MASS INDEX: 23.42 KG/M2 | DIASTOLIC BLOOD PRESSURE: 44 MMHG | HEIGHT: 68 IN | HEART RATE: 68 BPM

## 2021-05-15 VITALS
HEIGHT: 68 IN | HEART RATE: 80 BPM | WEIGHT: 138 LBS | SYSTOLIC BLOOD PRESSURE: 116 MMHG | DIASTOLIC BLOOD PRESSURE: 58 MMHG | OXYGEN SATURATION: 96 % | BODY MASS INDEX: 20.92 KG/M2

## 2021-05-15 VITALS
HEART RATE: 70 BPM | SYSTOLIC BLOOD PRESSURE: 85 MMHG | BODY MASS INDEX: 18.68 KG/M2 | HEIGHT: 68 IN | OXYGEN SATURATION: 99 % | WEIGHT: 123.25 LBS | DIASTOLIC BLOOD PRESSURE: 43 MMHG

## 2021-05-15 VITALS
SYSTOLIC BLOOD PRESSURE: 132 MMHG | OXYGEN SATURATION: 99 % | WEIGHT: 134.5 LBS | HEART RATE: 68 BPM | DIASTOLIC BLOOD PRESSURE: 71 MMHG

## 2021-05-15 VITALS
HEART RATE: 69 BPM | SYSTOLIC BLOOD PRESSURE: 97 MMHG | OXYGEN SATURATION: 99 % | DIASTOLIC BLOOD PRESSURE: 37 MMHG | WEIGHT: 122.37 LBS

## 2021-05-15 VITALS — WEIGHT: 124 LBS | BODY MASS INDEX: 18.79 KG/M2 | RESPIRATION RATE: 15 BRPM | HEIGHT: 68 IN

## 2021-05-15 VITALS — WEIGHT: 131.5 LBS

## 2021-05-16 VITALS
WEIGHT: 156 LBS | DIASTOLIC BLOOD PRESSURE: 45 MMHG | HEIGHT: 68 IN | SYSTOLIC BLOOD PRESSURE: 123 MMHG | OXYGEN SATURATION: 100 % | BODY MASS INDEX: 23.64 KG/M2 | HEART RATE: 64 BPM

## 2021-05-16 VITALS
SYSTOLIC BLOOD PRESSURE: 133 MMHG | OXYGEN SATURATION: 99 % | HEART RATE: 80 BPM | HEIGHT: 68 IN | BODY MASS INDEX: 24.74 KG/M2 | DIASTOLIC BLOOD PRESSURE: 51 MMHG | WEIGHT: 163.25 LBS

## 2021-05-16 VITALS
WEIGHT: 151.5 LBS | BODY MASS INDEX: 22.96 KG/M2 | SYSTOLIC BLOOD PRESSURE: 102 MMHG | DIASTOLIC BLOOD PRESSURE: 64 MMHG | HEART RATE: 66 BPM | HEIGHT: 68 IN

## 2021-05-16 VITALS
DIASTOLIC BLOOD PRESSURE: 47 MMHG | BODY MASS INDEX: 22.88 KG/M2 | HEIGHT: 68 IN | SYSTOLIC BLOOD PRESSURE: 103 MMHG | WEIGHT: 151 LBS | HEART RATE: 61 BPM | OXYGEN SATURATION: 98 %

## 2021-05-16 VITALS
DIASTOLIC BLOOD PRESSURE: 61 MMHG | SYSTOLIC BLOOD PRESSURE: 127 MMHG | HEIGHT: 68 IN | WEIGHT: 157.5 LBS | HEART RATE: 69 BPM | BODY MASS INDEX: 23.87 KG/M2

## 2021-05-16 VITALS
HEART RATE: 96 BPM | TEMPERATURE: 97.1 F | DIASTOLIC BLOOD PRESSURE: 75 MMHG | SYSTOLIC BLOOD PRESSURE: 140 MMHG | HEIGHT: 68 IN | WEIGHT: 164.12 LBS | BODY MASS INDEX: 24.87 KG/M2

## 2021-05-16 VITALS
DIASTOLIC BLOOD PRESSURE: 59 MMHG | WEIGHT: 159 LBS | BODY MASS INDEX: 24.1 KG/M2 | SYSTOLIC BLOOD PRESSURE: 120 MMHG | HEART RATE: 69 BPM | HEIGHT: 68 IN

## 2021-05-16 VITALS
WEIGHT: 155 LBS | HEART RATE: 79 BPM | HEIGHT: 68 IN | SYSTOLIC BLOOD PRESSURE: 105 MMHG | BODY MASS INDEX: 23.49 KG/M2 | DIASTOLIC BLOOD PRESSURE: 52 MMHG

## 2021-07-09 ENCOUNTER — OFFICE VISIT (OUTPATIENT)
Dept: FAMILY MEDICINE CLINIC | Facility: CLINIC | Age: 66
End: 2021-07-09

## 2021-07-09 VITALS
HEART RATE: 59 BPM | WEIGHT: 166.8 LBS | OXYGEN SATURATION: 98 % | HEIGHT: 70 IN | DIASTOLIC BLOOD PRESSURE: 62 MMHG | BODY MASS INDEX: 23.88 KG/M2 | SYSTOLIC BLOOD PRESSURE: 146 MMHG

## 2021-07-09 DIAGNOSIS — F02.80 ALZHEIMER'S DEMENTIA WITHOUT BEHAVIORAL DISTURBANCE, UNSPECIFIED TIMING OF DEMENTIA ONSET: ICD-10-CM

## 2021-07-09 DIAGNOSIS — D64.9 ANEMIA, UNSPECIFIED TYPE: ICD-10-CM

## 2021-07-09 DIAGNOSIS — F41.9 ANXIETY: ICD-10-CM

## 2021-07-09 DIAGNOSIS — Z13.220 SCREENING FOR LIPID DISORDERS: ICD-10-CM

## 2021-07-09 DIAGNOSIS — Z01.89 ROUTINE LAB DRAW: ICD-10-CM

## 2021-07-09 DIAGNOSIS — Z79.890 HORMONE REPLACEMENT THERAPY: Primary | ICD-10-CM

## 2021-07-09 DIAGNOSIS — E03.9 HYPOTHYROIDISM, UNSPECIFIED TYPE: ICD-10-CM

## 2021-07-09 DIAGNOSIS — G30.9 ALZHEIMER'S DEMENTIA WITHOUT BEHAVIORAL DISTURBANCE, UNSPECIFIED TIMING OF DEMENTIA ONSET: ICD-10-CM

## 2021-07-09 DIAGNOSIS — I10 ESSENTIAL HYPERTENSION: ICD-10-CM

## 2021-07-09 DIAGNOSIS — E55.9 VITAMIN D DEFICIENCY: ICD-10-CM

## 2021-07-09 DIAGNOSIS — E53.8 VITAMIN B12 DEFICIENCY: ICD-10-CM

## 2021-07-09 DIAGNOSIS — F02.80 EARLY ONSET ALZHEIMER'S DEMENTIA WITHOUT BEHAVIORAL DISTURBANCE (HCC): ICD-10-CM

## 2021-07-09 DIAGNOSIS — G30.0 EARLY ONSET ALZHEIMER'S DEMENTIA WITHOUT BEHAVIORAL DISTURBANCE (HCC): ICD-10-CM

## 2021-07-09 PROBLEM — F03.90 DEMENTIA (HCC): Status: ACTIVE | Noted: 2018-07-23

## 2021-07-09 PROBLEM — M19.90 ARTHRITIS: Status: ACTIVE | Noted: 2021-07-09

## 2021-07-09 PROBLEM — N95.1 POSTMENOPAUSAL SYNDROME: Status: ACTIVE | Noted: 2017-02-01

## 2021-07-09 PROBLEM — N32.9 BLADDER PROBLEM: Status: ACTIVE | Noted: 2021-07-09

## 2021-07-09 PROBLEM — I73.9 VASCULAR DISEASE, PERIPHERAL (HCC): Status: ACTIVE | Noted: 2021-07-09

## 2021-07-09 PROBLEM — F42.9 OBSESSIVE-COMPULSIVE DISORDER: Status: ACTIVE | Noted: 2021-07-09

## 2021-07-09 PROBLEM — R31.29 MICROHEMATURIA: Status: ACTIVE | Noted: 2021-07-09

## 2021-07-09 PROBLEM — E07.9 THYROID DISEASE: Status: ACTIVE | Noted: 2021-07-09

## 2021-07-09 PROBLEM — B15.9 HEPATITIS A: Status: ACTIVE | Noted: 2021-07-09

## 2021-07-09 PROCEDURE — 99213 OFFICE O/P EST LOW 20 MIN: CPT | Performed by: NURSE PRACTITIONER

## 2021-07-09 RX ORDER — LEVOTHYROXINE SODIUM 0.05 MG/1
50 TABLET ORAL DAILY
Qty: 90 TABLET | Refills: 1 | Status: SHIPPED | OUTPATIENT
Start: 2021-07-09 | End: 2022-01-25 | Stop reason: SDUPTHER

## 2021-07-09 RX ORDER — DONEPEZIL HYDROCHLORIDE 23 MG/1
23 TABLET, FILM COATED ORAL NIGHTLY
Qty: 90 TABLET | Refills: 1 | Status: SHIPPED | OUTPATIENT
Start: 2021-07-09 | End: 2021-10-23 | Stop reason: SDUPTHER

## 2021-07-09 RX ORDER — ERGOCALCIFEROL 1.25 MG/1
50000 CAPSULE ORAL WEEKLY
COMMUNITY
End: 2021-07-09 | Stop reason: SDUPTHER

## 2021-07-09 RX ORDER — LEVOTHYROXINE SODIUM 0.05 MG/1
TABLET ORAL
COMMUNITY
Start: 2021-06-02 | End: 2021-07-09 | Stop reason: SDUPTHER

## 2021-07-09 RX ORDER — MEMANTINE HYDROCHLORIDE 5 MG/1
5 TABLET ORAL 2 TIMES DAILY
COMMUNITY
End: 2021-07-09 | Stop reason: DRUGHIGH

## 2021-07-09 RX ORDER — MEMANTINE HYDROCHLORIDE 10 MG/1
10 TABLET ORAL 2 TIMES DAILY
Qty: 180 TABLET | Refills: 1 | Status: SHIPPED | OUTPATIENT
Start: 2021-07-09 | End: 2022-02-09 | Stop reason: SDUPTHER

## 2021-07-09 RX ORDER — MEMANTINE HYDROCHLORIDE 5 MG/1
5 TABLET ORAL
Status: CANCELLED | OUTPATIENT
Start: 2021-07-09

## 2021-07-09 RX ORDER — HYDROXYZINE HYDROCHLORIDE 10 MG/1
10 TABLET, FILM COATED ORAL
COMMUNITY
End: 2021-07-09 | Stop reason: SDUPTHER

## 2021-07-09 RX ORDER — ERGOCALCIFEROL 1.25 MG/1
50000 CAPSULE ORAL WEEKLY
Qty: 12 CAPSULE | Refills: 1 | Status: SHIPPED | OUTPATIENT
Start: 2021-07-09 | End: 2021-09-24 | Stop reason: SDUPTHER

## 2021-07-09 RX ORDER — DONEPEZIL HYDROCHLORIDE 23 MG/1
23 TABLET, FILM COATED ORAL NIGHTLY
COMMUNITY
End: 2021-07-09 | Stop reason: SDUPTHER

## 2021-07-09 RX ORDER — DIPHENOXYLATE HYDROCHLORIDE AND ATROPINE SULFATE 2.5; .025 MG/1; MG/1
2 TABLET ORAL 2 TIMES DAILY
Status: ON HOLD | COMMUNITY
End: 2021-09-20 | Stop reason: SDUPTHER

## 2021-07-09 RX ORDER — HYDROXYZINE HYDROCHLORIDE 10 MG/1
10 TABLET, FILM COATED ORAL
Qty: 90 TABLET | Refills: 1 | Status: SHIPPED | OUTPATIENT
Start: 2021-07-09 | End: 2022-01-25 | Stop reason: SDUPTHER

## 2021-07-09 NOTE — PROGRESS NOTES
"Chief Complaint  Follow-up (6 MONTH ) and Med Refill (90 days )    Subjective          Marissa Rob presents to [facility]  Pt states that she has gaining some weight. Pt  feels like the dementia getting a worse. States he is now doing all her ADLs to include showering, painting nails, dressing, cooking, etc. Sheis taking Namenda and Aricept-increased dose of Namenda to 10mg bid.     Requests samples of Mybertriq-samples given today. States it is working well for her.     Denies issues with diarrhea in the last yr since being on Lomotil-she is taking 2 tabs daily which is down from 2 tabs bid.    Vit D -taking vit d 50,000 units once wk    She takes allergy shots once every 3 wks states symptoms under much better control.    Anxiety-well controlled since being placed on hydroxyzine.    Hypothyroid-doing well on current dose of synthroid    Wt gain of 25lbs since her last visit-states she now eats well.                 She  has a past medical history of Arthritis, Bladder problem, Cerebrovascular disease (06/11/2015), Dementia (CMS/HCC) (07/23/2018), Hepatitis A, Hypertension, Hypomagnesemia (01/11/2016), Hypothyroidism (05/09/2015), Leg pain, Memory loss (05/09/2015), Microhematuria, Muscle cramps, Obsessive compulsive disorder, Pap smear for cervical cancer screening, Postmenopausal syndrome (02/01/2017), Thyroid disease, Vascular dementia (CMS/HCC) (08/10/2015), Vascular disease, peripheral (CMS/[facility]), and Vitamin D deficiency.     Objective   Vital Signs:   /62 (BP Location: Right arm, Patient Position: Sitting, Cuff Size: Adult)   Pulse 59   Ht 177.8 cm (70\")   Wt 75.7 kg (166 lb 12.8 oz)   SpO2 98%   BMI 23.93 kg/m²     Physical Exam   Result Review :            Assessment and Plan    Diagnoses and all orders for this visit:    1. Hormone replacement therapy (Primary)  -     estradiol (CLIMARA) 0.025 MG/24HR patch; Place 1 patch on the skin as directed " by provider 1 (One) Time Per Week.  Dispense: 12 patch; Refill: 1    2. Vitamin D deficiency  -     vitamin D (ERGOCALCIFEROL) 1.25 MG (98728 UT) capsule capsule; Take 1 capsule by mouth 1 (One) Time Per Week.  Dispense: 12 capsule; Refill: 1  -     Vitamin D 25 Hydroxy; Future    3. Early onset Alzheimer's dementia without behavioral disturbance (CMS/HCC)    4. Anxiety  -     hydrOXYzine (ATARAX) 10 MG tablet; Take 1 tablet by mouth every night at bedtime.  Dispense: 90 tablet; Refill: 1    5. Alzheimer's dementia without behavioral disturbance, unspecified timing of dementia onset (CMS/HCC)  -     donepezil (ARICEPT) 23 MG tablet; Take 1 tablet by mouth Every Night.  Dispense: 90 tablet; Refill: 1  -     memantine (Namenda) 10 MG tablet; Take 1 tablet by mouth 2 (Two) Times a Day.  Dispense: 180 tablet; Refill: 1    6. Hypothyroidism, unspecified type  -     levothyroxine (SYNTHROID, LEVOTHROID) 50 MCG tablet; Take 1 tablet by mouth Daily.  Dispense: 90 tablet; Refill: 1  -     TSH; Future    7. Vitamin B12 deficiency  -     Vitamin B12; Future    8. Routine lab draw  -     CBC & Differential; Future    9. Essential hypertension  -     Comprehensive Metabolic Panel; Future    10. Screening for lipid disorders  -     Lipid Panel; Future    11. Anemia, unspecified type  -     CBC & Differential; Future    Other orders  -     Cancel: memantine (NAMENDA) 5 MG tablet; Take 1 tablet by mouth.        Follow Up   Return in about 3 months (around 10/9/2021).  Patient was given instructions and counseling regarding her condition or for health maintenance advice. Please see specific information pulled into the AVS if appropriate.     Marissa Hall   reports that she has never smoked. She does not have any smokeless tobacco history on file..

## 2021-09-07 ENCOUNTER — HOSPITAL ENCOUNTER (EMERGENCY)
Facility: HOSPITAL | Age: 66
Discharge: HOME | End: 2021-09-07
Attending: EMERGENCY MEDICINE | Admitting: EMERGENCY MEDICINE

## 2021-09-07 ENCOUNTER — APPOINTMENT (OUTPATIENT)
Dept: CT IMAGING | Facility: HOSPITAL | Age: 66
End: 2021-09-07

## 2021-09-07 ENCOUNTER — APPOINTMENT (OUTPATIENT)
Dept: GENERAL RADIOLOGY | Facility: HOSPITAL | Age: 66
End: 2021-09-07

## 2021-09-07 VITALS
OXYGEN SATURATION: 97 % | HEIGHT: 68 IN | DIASTOLIC BLOOD PRESSURE: 70 MMHG | SYSTOLIC BLOOD PRESSURE: 150 MMHG | RESPIRATION RATE: 15 BRPM | TEMPERATURE: 97.7 F | BODY MASS INDEX: 29.1 KG/M2 | WEIGHT: 192.02 LBS | HEART RATE: 68 BPM

## 2021-09-07 VITALS
DIASTOLIC BLOOD PRESSURE: 62 MMHG | HEIGHT: 69 IN | SYSTOLIC BLOOD PRESSURE: 149 MMHG | RESPIRATION RATE: 17 BRPM | OXYGEN SATURATION: 100 % | HEART RATE: 66 BPM | TEMPERATURE: 98.4 F | BODY MASS INDEX: 26.19 KG/M2 | WEIGHT: 176.81 LBS
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
## 2021-09-07 DIAGNOSIS — N30.00 ACUTE CYSTITIS WITHOUT HEMATURIA: Primary | ICD-10-CM

## 2021-09-07 DIAGNOSIS — S00.03XA CONTUSION OF SCALP, INITIAL ENCOUNTER: ICD-10-CM

## 2021-09-07 DIAGNOSIS — S01.01XA SUPERFICIAL LACERATION OF SCALP, INITIAL ENCOUNTER: ICD-10-CM

## 2021-09-07 LAB
ALBUMIN SERPL-MCNC: 4 G/DL (ref 3.5–5.2)
ALBUMIN/GLOB SERPL: 1.3 G/DL
ALP SERPL-CCNC: 79 U/L (ref 39–117)
ALT SERPL W P-5'-P-CCNC: 11 U/L (ref 1–33)
ANION GAP SERPL CALCULATED.3IONS-SCNC: 10.1 MMOL/L (ref 5–15)
AST SERPL-CCNC: 18 U/L (ref 1–32)
BACTERIA UR QL AUTO: ABNORMAL /HPF
BASOPHILS # BLD AUTO: 0.08 10*3/MM3 (ref 0–0.2)
BASOPHILS NFR BLD AUTO: 1 % (ref 0–1.5)
BILIRUB SERPL-MCNC: 0.3 MG/DL (ref 0–1.2)
BILIRUB UR QL STRIP: NEGATIVE
BUN SERPL-MCNC: 18 MG/DL (ref 8–23)
BUN/CREAT SERPL: 17 (ref 7–25)
CALCIUM SPEC-SCNC: 9.3 MG/DL (ref 8.6–10.5)
CHLORIDE SERPL-SCNC: 103 MMOL/L (ref 98–107)
CK MB SERPL-CCNC: 1.43 NG/ML
CK SERPL-CCNC: 74 U/L (ref 20–180)
CLARITY UR: ABNORMAL
CO2 SERPL-SCNC: 24.9 MMOL/L (ref 22–29)
COLOR UR: YELLOW
CREAT SERPL-MCNC: 1.06 MG/DL (ref 0.57–1)
D-LACTATE SERPL-SCNC: 1.9 MMOL/L (ref 0.5–2)
DEPRECATED RDW RBC AUTO: 45.9 FL (ref 37–54)
EOSINOPHIL # BLD AUTO: 0.23 10*3/MM3 (ref 0–0.4)
EOSINOPHIL NFR BLD AUTO: 2.9 % (ref 0.3–6.2)
ERYTHROCYTE [DISTWIDTH] IN BLOOD BY AUTOMATED COUNT: 12.8 % (ref 12.3–15.4)
GFR SERPL CREATININE-BSD FRML MDRD: 52 ML/MIN/1.73
GLOBULIN UR ELPH-MCNC: 3.1 GM/DL
GLUCOSE SERPL-MCNC: 133 MG/DL (ref 65–99)
GLUCOSE UR STRIP-MCNC: NEGATIVE MG/DL
HCT VFR BLD AUTO: 42.3 % (ref 34–46.6)
HGB BLD-MCNC: 13.7 G/DL (ref 12–15.9)
HGB UR QL STRIP.AUTO: NEGATIVE
HOLD SPECIMEN: NORMAL
HOLD SPECIMEN: NORMAL
HYALINE CASTS UR QL AUTO: ABNORMAL /LPF
IMM GRANULOCYTES # BLD AUTO: 0.05 10*3/MM3 (ref 0–0.05)
IMM GRANULOCYTES NFR BLD AUTO: 0.6 % (ref 0–0.5)
KETONES UR QL STRIP: NEGATIVE
LEUKOCYTE ESTERASE UR QL STRIP.AUTO: ABNORMAL
LIPASE SERPL-CCNC: 40 U/L (ref 13–60)
LYMPHOCYTES # BLD AUTO: 1.9 10*3/MM3 (ref 0.7–3.1)
LYMPHOCYTES NFR BLD AUTO: 24.1 % (ref 19.6–45.3)
MAGNESIUM SERPL-MCNC: 1.9 MG/DL (ref 1.6–2.4)
MCH RBC QN AUTO: 31.6 PG (ref 26.6–33)
MCHC RBC AUTO-ENTMCNC: 32.4 G/DL (ref 31.5–35.7)
MCV RBC AUTO: 97.7 FL (ref 79–97)
MONOCYTES # BLD AUTO: 0.84 10*3/MM3 (ref 0.1–0.9)
MONOCYTES NFR BLD AUTO: 10.7 % (ref 5–12)
NEUTROPHILS NFR BLD AUTO: 4.77 10*3/MM3 (ref 1.7–7)
NEUTROPHILS NFR BLD AUTO: 60.7 % (ref 42.7–76)
NITRITE UR QL STRIP: POSITIVE
NRBC BLD AUTO-RTO: 0 /100 WBC (ref 0–0.2)
NT-PROBNP SERPL-MCNC: 99.6 PG/ML (ref 0–900)
PH UR STRIP.AUTO: 7 [PH] (ref 5–8)
PLATELET # BLD AUTO: 224 10*3/MM3 (ref 140–450)
PMV BLD AUTO: 9.2 FL (ref 6–12)
POTASSIUM SERPL-SCNC: 4.1 MMOL/L (ref 3.5–5.2)
PROT SERPL-MCNC: 7.1 G/DL (ref 6–8.5)
PROT UR QL STRIP: NEGATIVE
QT INTERVAL: 425 MS
QT INTERVAL: 439 MS
RBC # BLD AUTO: 4.33 10*6/MM3 (ref 3.77–5.28)
RBC # UR: ABNORMAL /HPF
REF LAB TEST METHOD: ABNORMAL
SODIUM SERPL-SCNC: 138 MMOL/L (ref 136–145)
SP GR UR STRIP: 1.01 (ref 1–1.03)
SQUAMOUS #/AREA URNS HPF: ABNORMAL /HPF
TROPONIN I SERPL-MCNC: 0 NG/ML (ref 0–0.6)
TROPONIN T SERPL-MCNC: <0.01 NG/ML (ref 0–0.03)
TROPONIN T SERPL-MCNC: <0.01 NG/ML (ref 0–0.03)
UROBILINOGEN UR QL STRIP: ABNORMAL
WBC # BLD AUTO: 7.87 10*3/MM3 (ref 3.4–10.8)
WBC UR QL AUTO: ABNORMAL /HPF
WHOLE BLOOD HOLD SPECIMEN: NORMAL
WHOLE BLOOD HOLD SPECIMEN: NORMAL

## 2021-09-07 PROCEDURE — 84484 ASSAY OF TROPONIN QUANT: CPT

## 2021-09-07 PROCEDURE — 99283 EMERGENCY DEPT VISIT LOW MDM: CPT

## 2021-09-07 PROCEDURE — 96365 THER/PROPH/DIAG IV INF INIT: CPT

## 2021-09-07 PROCEDURE — 83605 ASSAY OF LACTIC ACID: CPT | Performed by: EMERGENCY MEDICINE

## 2021-09-07 PROCEDURE — 84484 ASSAY OF TROPONIN QUANT: CPT | Performed by: EMERGENCY MEDICINE

## 2021-09-07 PROCEDURE — 25010000002 MEROPENEM PER 100 MG: Performed by: EMERGENCY MEDICINE

## 2021-09-07 PROCEDURE — 73552 X-RAY EXAM OF FEMUR 2/>: CPT

## 2021-09-07 PROCEDURE — 81001 URINALYSIS AUTO W/SCOPE: CPT | Performed by: EMERGENCY MEDICINE

## 2021-09-07 PROCEDURE — 71045 X-RAY EXAM CHEST 1 VIEW: CPT

## 2021-09-07 PROCEDURE — 83735 ASSAY OF MAGNESIUM: CPT | Performed by: EMERGENCY MEDICINE

## 2021-09-07 PROCEDURE — 93005 ELECTROCARDIOGRAM TRACING: CPT | Performed by: EMERGENCY MEDICINE

## 2021-09-07 PROCEDURE — 83880 ASSAY OF NATRIURETIC PEPTIDE: CPT | Performed by: EMERGENCY MEDICINE

## 2021-09-07 PROCEDURE — 93010 ELECTROCARDIOGRAM REPORT: CPT | Performed by: INTERNAL MEDICINE

## 2021-09-07 PROCEDURE — 83690 ASSAY OF LIPASE: CPT | Performed by: EMERGENCY MEDICINE

## 2021-09-07 PROCEDURE — 85025 COMPLETE CBC W/AUTO DIFF WBC: CPT

## 2021-09-07 PROCEDURE — 82550 ASSAY OF CK (CPK): CPT | Performed by: EMERGENCY MEDICINE

## 2021-09-07 PROCEDURE — 87040 BLOOD CULTURE FOR BACTERIA: CPT | Performed by: EMERGENCY MEDICINE

## 2021-09-07 PROCEDURE — 80053 COMPREHEN METABOLIC PANEL: CPT | Performed by: EMERGENCY MEDICINE

## 2021-09-07 PROCEDURE — 73502 X-RAY EXAM HIP UNI 2-3 VIEWS: CPT

## 2021-09-07 PROCEDURE — 70450 CT HEAD/BRAIN W/O DYE: CPT

## 2021-09-07 PROCEDURE — 74176 CT ABD & PELVIS W/O CONTRAST: CPT

## 2021-09-07 PROCEDURE — 82553 CREATINE MB FRACTION: CPT | Performed by: EMERGENCY MEDICINE

## 2021-09-07 PROCEDURE — 93005 ELECTROCARDIOGRAM TRACING: CPT

## 2021-09-07 RX ORDER — ASPIRIN 81 MG/1
324 TABLET, CHEWABLE ORAL ONCE
Status: COMPLETED | OUTPATIENT
Start: 2021-09-07 | End: 2021-09-07

## 2021-09-07 RX ORDER — GINSENG 100 MG
CAPSULE ORAL EVERY 8 HOURS SCHEDULED
Status: COMPLETED | OUTPATIENT
Start: 2021-09-07 | End: 2021-09-07

## 2021-09-07 RX ORDER — SODIUM CHLORIDE 0.9 % (FLUSH) 0.9 %
10 SYRINGE (ML) INJECTION AS NEEDED
Status: DISCONTINUED | OUTPATIENT
Start: 2021-09-07 | End: 2021-09-07 | Stop reason: HOSPADM

## 2021-09-07 RX ORDER — SULFAMETHOXAZOLE AND TRIMETHOPRIM 800; 160 MG/1; MG/1
1 TABLET ORAL 2 TIMES DAILY
Qty: 20 TABLET | Refills: 0 | Status: SHIPPED | OUTPATIENT
Start: 2021-09-07 | End: 2021-09-12 | Stop reason: HOSPADM

## 2021-09-07 RX ADMIN — Medication 1 APPLICATION: at 15:14

## 2021-09-07 RX ADMIN — ASPIRIN 324 MG: 81 TABLET, CHEWABLE ORAL at 05:20

## 2021-09-07 RX ADMIN — MEROPENEM 1 G: 1 INJECTION INTRAVENOUS at 08:19

## 2021-09-07 NOTE — ED PROVIDER NOTES
Subjective   This patient presents to the emergency department with her .  The patient has a history of  dementia amongst other medical issues.  The patient lives at home with her  normal is ambulatory only with assistance.  The patient's  states that the patient has been less interactive than normal and she called out during the night complaining of both chest discomfort and abdominal pain.  Currently the patient has no complaints however the  states that she just has not seemed like she is acting normally.  The patient has had prior urinary tract infections with alterations in mental status.  The patient's  states that he did note some blood on a sanitary napkin previously however this resolved.          Review of Systems   Constitutional: Positive for activity change and appetite change. Negative for chills, fatigue, fever and unexpected weight change.   HENT: Negative.    Eyes: Negative.    Respiratory: Negative.    Cardiovascular: Negative.    Gastrointestinal: Negative.    Endocrine: Negative.    Genitourinary: Negative.    Musculoskeletal: Negative.    Skin: Negative.    Allergic/Immunologic: Negative.    Neurological: Positive for weakness. Negative for dizziness, tremors, seizures, syncope, facial asymmetry, speech difficulty, light-headedness, numbness and headaches.   Hematological: Negative.    Psychiatric/Behavioral: Positive for behavioral problems. Negative for agitation, decreased concentration, dysphoric mood, hallucinations, self-injury, sleep disturbance and suicidal ideas. The patient is not nervous/anxious and is not hyperactive.         Patient has chronic confusion secondary to dementia and patient's  states that her behavior has changed somewhat recently.       Past Medical History:   Diagnosis Date   • Arthritis    • Bladder problem    • Cerebrovascular disease 06/11/2015   • Dementia (CMS/Formerly [facility]) 07/23/2018    3yrs memory change, neuropsych test  2015.on aricept switched to memantine. MRI of brain revealed few MILD scattered FLAIR hyperintensities. Reluctant to attribute to memory issues. noted to have atrophy of the bitemporal region a component of Alzheimer's dementia. Refer for repeat neuropsych testing. titrate her memory/ 10 mg twice per day   • Hepatitis A    • Hypertension    • Hypomagnesemia 01/11/2016   • Hypothyroidism 05/09/2015   • Leg pain    • Memory loss 05/09/2015   • Microhematuria    • Muscle cramps    • Obsessive compulsive disorder    • Pap smear for cervical cancer screening     no longer   • Postmenopausal syndrome 02/01/2017   • Thyroid disease    • Vascular dementia (CMS/HCC) 08/10/2015   • Vascular disease, peripheral (CMS/HCC)    • Vitamin D deficiency        Allergies   Allergen Reactions   • Levofloxacin Other (See Comments)     Joint pain   • Cefuroxime Axetil Rash   • Contrast Dye Rash   • Pravachol [Pravastatin] Other (See Comments)     muscle aches and cramping       Past Surgical History:   Procedure Laterality Date   • BACK SURGERY     • BLADDER REPAIR  2000   • COLONOSCOPY  06/2020    POLYP   • CYSTOSCOPY  05/28/2020   • HYSTERECTOMY      partial   • LAMINECTOMY  1989    L5-S1   • TUBAL ABDOMINAL LIGATION  1979       Family History   Problem Relation Age of Onset   • Hypertension Mother    • Cancer Mother    • Heart disease Father    • Hypertension Father    • Diabetes Father    • Cancer Father    • Lung cancer Brother    • Stomach cancer Other         Aunt   • Lung cancer Other         uncle       Social History     Socioeconomic History   • Marital status:      Spouse name: Not on file   • Number of children: Not on file   • Years of education: Not on file   • Highest education level: Not on file   Tobacco Use   • Smoking status: Never Smoker   Substance and Sexual Activity   • Alcohol use: Never     Comment: does not drink   • Drug use: Never   • Sexual activity: Defer           Objective   Physical Exam  Vitals  and nursing note reviewed.   Constitutional:       Appearance: She is well-developed.   HENT:      Head: Normocephalic and atraumatic.      Mouth/Throat:      Mouth: Mucous membranes are moist.      Pharynx: Oropharynx is clear.   Eyes:      Extraocular Movements: Extraocular movements intact.      Pupils: Pupils are equal, round, and reactive to light.   Cardiovascular:      Rate and Rhythm: Normal rate and regular rhythm.      Heart sounds: Normal heart sounds.   Pulmonary:      Effort: Pulmonary effort is normal.      Breath sounds: Normal breath sounds.   Abdominal:      General: Bowel sounds are normal.      Palpations: Abdomen is soft.   Musculoskeletal:         General: Normal range of motion.      Cervical back: Normal range of motion and neck supple.   Skin:     General: Skin is warm.   Neurological:      Mental Status: She is alert.   Psychiatric:         Mood and Affect: Mood normal.      Comments: Patient is alert but disoriented.  The patient appears to be at her baseline currently.         Procedures           ED Course                                           MDM  Number of Diagnoses or Management Options  Acute cystitis without hematuria  Diagnosis management comments: The patient is sexton\ert and disoriented and she is currently at baseline.  She will be discharged home after IV antibiotics.  She has no signs of sepsis.       Amount and/or Complexity of Data Reviewed  Clinical lab tests: reviewed  Tests in the radiology section of CPT®: reviewed  Tests in the medicine section of CPT®: reviewed  Decide to obtain previous medical records or to obtain history from someone other than the patient: yes  Obtain history from someone other than the patient: yes  Review and summarize past medical records: yes  Independent visualization of images, tracings, or specimens: yes    Patient Progress  Patient progress: improved      Final diagnoses:   Acute cystitis without hematuria       ED Disposition  ED  Disposition     ED Disposition Condition Comment    Discharge Stable           Desmond Rabia Elvie, APRN  [address]  [address]  [facility] [id number] [phone]    In 2 days  If not better         Medication List      New Prescriptions    sulfamethoxazole-trimethoprim 800-160 MG per tablet  Commonly known as: BACTRIM DS,SEPTRA DS  Take 1 tablet by mouth 2 (Two) Times a Day.           Where to Get Your Medications      These medications were sent to [facility] [address] [place], [place] [address], [place] - [address] AT [facility] [address] - [phone]  - [phone]   [address] [facility], [facility] [address]    Phone: [phone]   · sulfamethoxazole-trimethoprim 800-160 MG per tablet          [employer],   09/08/21 0804

## 2021-09-07 NOTE — ED NOTES
Portable CXR in progress. Patient given warm blanket. No further needs voiced at this time.     Kimberlee Matos RN  09/07/21 0666

## 2021-09-07 NOTE — ED NOTES
"To  ER per EMS w/ generalized weakness, c/o \"not feeling quite right\", chest pain and abdominal pain that resolved PTA but started when she awoke this am to use the bathroom. Also c/o left leg pain that also resolved PTA.  reports that she had a UTI in 2019 but feels these symptoms are different. Patient has dementia and is a poor historian.  also reports decreased PO intake for approximately 4 days.     Kimberlee Matos RN  09/07/21 0533    "

## 2021-09-08 ENCOUNTER — HOSPITAL ENCOUNTER (INPATIENT)
Facility: HOSPITAL | Age: 66
LOS: 4 days | Discharge: HOME | End: 2021-09-12
Attending: EMERGENCY MEDICINE | Admitting: INTERNAL MEDICINE

## 2021-09-08 DIAGNOSIS — R41.0 CONFUSION: ICD-10-CM

## 2021-09-08 DIAGNOSIS — N39.0 URINARY TRACT INFECTION WITHOUT HEMATURIA, SITE UNSPECIFIED: Primary | ICD-10-CM

## 2021-09-08 DIAGNOSIS — R53.1 GENERALIZED WEAKNESS: ICD-10-CM

## 2021-09-08 LAB
ALBUMIN SERPL-MCNC: 4.2 G/DL (ref 3.5–5.2)
ALBUMIN/GLOB SERPL: 1.3 G/DL
ALP SERPL-CCNC: 74 U/L (ref 39–117)
ALT SERPL W P-5'-P-CCNC: 14 U/L (ref 1–33)
ANION GAP SERPL CALCULATED.3IONS-SCNC: 13.7 MMOL/L (ref 5–15)
AST SERPL-CCNC: 28 U/L (ref 1–32)
BACTERIA UR QL AUTO: ABNORMAL /HPF
BASOPHILS # BLD AUTO: 0.05 10*3/MM3 (ref 0–0.2)
BASOPHILS NFR BLD AUTO: 0.5 % (ref 0–1.5)
BILIRUB SERPL-MCNC: 0.5 MG/DL (ref 0–1.2)
BILIRUB UR QL STRIP: NEGATIVE
BUN SERPL-MCNC: 16 MG/DL (ref 8–23)
BUN/CREAT SERPL: 13.6 (ref 7–25)
CALCIUM SPEC-SCNC: 9.4 MG/DL (ref 8.6–10.5)
CHLORIDE SERPL-SCNC: 105 MMOL/L (ref 98–107)
CLARITY UR: ABNORMAL
CO2 SERPL-SCNC: 21.3 MMOL/L (ref 22–29)
COLOR UR: YELLOW
CREAT SERPL-MCNC: 1.18 MG/DL (ref 0.57–1)
D-LACTATE SERPL-SCNC: 2.3 MMOL/L (ref 0.5–2)
D-LACTATE SERPL-SCNC: 3.5 MMOL/L (ref 0.5–2)
DEPRECATED RDW RBC AUTO: 47.1 FL (ref 37–54)
EOSINOPHIL # BLD AUTO: 0.13 10*3/MM3 (ref 0–0.4)
EOSINOPHIL NFR BLD AUTO: 1.3 % (ref 0.3–6.2)
ERYTHROCYTE [DISTWIDTH] IN BLOOD BY AUTOMATED COUNT: 13.2 % (ref 12.3–15.4)
GFR SERPL CREATININE-BSD FRML MDRD: 46 ML/MIN/1.73
GLOBULIN UR ELPH-MCNC: 3.3 GM/DL
GLUCOSE SERPL-MCNC: 133 MG/DL (ref 65–99)
GLUCOSE UR STRIP-MCNC: NEGATIVE MG/DL
HCT VFR BLD AUTO: 41.4 % (ref 34–46.6)
HGB BLD-MCNC: 13.6 G/DL (ref 12–15.9)
HGB UR QL STRIP.AUTO: ABNORMAL
HYALINE CASTS UR QL AUTO: ABNORMAL /LPF
IMM GRANULOCYTES # BLD AUTO: 0.06 10*3/MM3 (ref 0–0.05)
IMM GRANULOCYTES NFR BLD AUTO: 0.6 % (ref 0–0.5)
KETONES UR QL STRIP: NEGATIVE
LEUKOCYTE ESTERASE UR QL STRIP.AUTO: ABNORMAL
LIPASE SERPL-CCNC: 27 U/L (ref 13–60)
LYMPHOCYTES # BLD AUTO: 1.84 10*3/MM3 (ref 0.7–3.1)
LYMPHOCYTES NFR BLD AUTO: 18.1 % (ref 19.6–45.3)
MAGNESIUM SERPL-MCNC: 2 MG/DL (ref 1.6–2.4)
MCH RBC QN AUTO: 31.8 PG (ref 26.6–33)
MCHC RBC AUTO-ENTMCNC: 32.9 G/DL (ref 31.5–35.7)
MCV RBC AUTO: 96.7 FL (ref 79–97)
MONOCYTES # BLD AUTO: 1 10*3/MM3 (ref 0.1–0.9)
MONOCYTES NFR BLD AUTO: 9.8 % (ref 5–12)
NEUTROPHILS NFR BLD AUTO: 69.7 % (ref 42.7–76)
NEUTROPHILS NFR BLD AUTO: 7.1 10*3/MM3 (ref 1.7–7)
NITRITE UR QL STRIP: NEGATIVE
NRBC BLD AUTO-RTO: 0 /100 WBC (ref 0–0.2)
PH UR STRIP.AUTO: 7 [PH] (ref 5–8)
PLATELET # BLD AUTO: 237 10*3/MM3 (ref 140–450)
PMV BLD AUTO: 9.1 FL (ref 6–12)
POTASSIUM SERPL-SCNC: 4 MMOL/L (ref 3.5–5.2)
PROT SERPL-MCNC: 7.5 G/DL (ref 6–8.5)
PROT UR QL STRIP: ABNORMAL
RBC # BLD AUTO: 4.28 10*6/MM3 (ref 3.77–5.28)
RBC # UR: ABNORMAL /HPF
REF LAB TEST METHOD: ABNORMAL
SODIUM SERPL-SCNC: 140 MMOL/L (ref 136–145)
SP GR UR STRIP: 1.02 (ref 1–1.03)
SQUAMOUS #/AREA URNS HPF: ABNORMAL /HPF
UROBILINOGEN UR QL STRIP: ABNORMAL
WBC # BLD AUTO: 10.18 10*3/MM3 (ref 3.4–10.8)
WBC UR QL AUTO: ABNORMAL /HPF

## 2021-09-08 PROCEDURE — 25010000002 PIPERACILLIN SOD-TAZOBACTAM PER 1 G: Performed by: PHYSICIAN ASSISTANT

## 2021-09-08 PROCEDURE — 81001 URINALYSIS AUTO W/SCOPE: CPT | Performed by: EMERGENCY MEDICINE

## 2021-09-08 PROCEDURE — 25010000002 PIPERACILLIN SOD-TAZOBACTAM PER 1 G: Performed by: EMERGENCY MEDICINE

## 2021-09-08 PROCEDURE — 99223 1ST HOSP IP/OBS HIGH 75: CPT | Performed by: PHYSICIAN ASSISTANT

## 2021-09-08 PROCEDURE — 83605 ASSAY OF LACTIC ACID: CPT | Performed by: EMERGENCY MEDICINE

## 2021-09-08 PROCEDURE — 80053 COMPREHEN METABOLIC PANEL: CPT | Performed by: EMERGENCY MEDICINE

## 2021-09-08 PROCEDURE — 83690 ASSAY OF LIPASE: CPT | Performed by: EMERGENCY MEDICINE

## 2021-09-08 PROCEDURE — 87086 URINE CULTURE/COLONY COUNT: CPT | Performed by: EMERGENCY MEDICINE

## 2021-09-08 PROCEDURE — 83735 ASSAY OF MAGNESIUM: CPT | Performed by: EMERGENCY MEDICINE

## 2021-09-08 PROCEDURE — 87040 BLOOD CULTURE FOR BACTERIA: CPT | Performed by: EMERGENCY MEDICINE

## 2021-09-08 PROCEDURE — 99284 EMERGENCY DEPT VISIT MOD MDM: CPT

## 2021-09-08 PROCEDURE — 85025 COMPLETE CBC W/AUTO DIFF WBC: CPT | Performed by: EMERGENCY MEDICINE

## 2021-09-08 RX ORDER — MEMANTINE HYDROCHLORIDE 10 MG/1
10 TABLET ORAL 2 TIMES DAILY
Status: DISCONTINUED | OUTPATIENT
Start: 2021-09-08 | End: 2021-09-12 | Stop reason: HOSPADM

## 2021-09-08 RX ORDER — ONDANSETRON 2 MG/ML
4 INJECTION INTRAMUSCULAR; INTRAVENOUS EVERY 6 HOURS PRN
Status: DISCONTINUED | OUTPATIENT
Start: 2021-09-08 | End: 2021-09-12 | Stop reason: HOSPADM

## 2021-09-08 RX ORDER — SODIUM CHLORIDE, SODIUM LACTATE, POTASSIUM CHLORIDE, CALCIUM CHLORIDE 600; 310; 30; 20 MG/100ML; MG/100ML; MG/100ML; MG/100ML
100 INJECTION, SOLUTION INTRAVENOUS CONTINUOUS
Status: DISCONTINUED | OUTPATIENT
Start: 2021-09-08 | End: 2021-09-12 | Stop reason: HOSPADM

## 2021-09-08 RX ORDER — DONEPEZIL HYDROCHLORIDE 10 MG/1
20 TABLET, FILM COATED ORAL NIGHTLY
Status: DISCONTINUED | OUTPATIENT
Start: 2021-09-08 | End: 2021-09-12 | Stop reason: HOSPADM

## 2021-09-08 RX ORDER — ALUMINA, MAGNESIA, AND SIMETHICONE 2400; 2400; 240 MG/30ML; MG/30ML; MG/30ML
15 SUSPENSION ORAL EVERY 6 HOURS PRN
Status: DISCONTINUED | OUTPATIENT
Start: 2021-09-08 | End: 2021-09-12 | Stop reason: HOSPADM

## 2021-09-08 RX ORDER — LEVOTHYROXINE SODIUM 0.05 MG/1
50 TABLET ORAL DAILY
Status: DISCONTINUED | OUTPATIENT
Start: 2021-09-08 | End: 2021-09-12 | Stop reason: HOSPADM

## 2021-09-08 RX ORDER — SODIUM CHLORIDE 0.9 % (FLUSH) 0.9 %
10 SYRINGE (ML) INJECTION AS NEEDED
Status: DISCONTINUED | OUTPATIENT
Start: 2021-09-08 | End: 2021-09-12 | Stop reason: HOSPADM

## 2021-09-08 RX ORDER — SODIUM CHLORIDE 0.9 % (FLUSH) 0.9 %
10 SYRINGE (ML) INJECTION EVERY 12 HOURS SCHEDULED
Status: DISCONTINUED | OUTPATIENT
Start: 2021-09-08 | End: 2021-09-12 | Stop reason: HOSPADM

## 2021-09-08 RX ORDER — CHOLECALCIFEROL (VITAMIN D3) 125 MCG
5 CAPSULE ORAL NIGHTLY PRN
Status: DISCONTINUED | OUTPATIENT
Start: 2021-09-08 | End: 2021-09-12 | Stop reason: HOSPADM

## 2021-09-08 RX ADMIN — TAZOBACTAM SODIUM AND PIPERACILLIN SODIUM 3.38 G: 375; 3 INJECTION, SOLUTION INTRAVENOUS at 19:25

## 2021-09-08 RX ADMIN — SODIUM CHLORIDE 1000 ML: 9 INJECTION, SOLUTION INTRAVENOUS at 05:18

## 2021-09-08 RX ADMIN — SODIUM CHLORIDE, POTASSIUM CHLORIDE, SODIUM LACTATE AND CALCIUM CHLORIDE 100 ML/HR: 600; 310; 30; 20 INJECTION, SOLUTION INTRAVENOUS at 19:25

## 2021-09-08 RX ADMIN — MEMANTINE 10 MG: 10 TABLET ORAL at 20:57

## 2021-09-08 RX ADMIN — TAZOBACTAM SODIUM AND PIPERACILLIN SODIUM 3.38 G: 375; 3 INJECTION, SOLUTION INTRAVENOUS at 05:17

## 2021-09-08 RX ADMIN — LEVOTHYROXINE SODIUM 50 MCG: 50 TABLET ORAL at 19:25

## 2021-09-08 RX ADMIN — DONEPEZIL HYDROCHLORIDE 20 MG: 10 TABLET, FILM COATED ORAL at 20:57

## 2021-09-08 NOTE — ED PROVIDER NOTES
Time: 2:09 AM EDT  Arrived by: private car  Chief Complaint:   Chief Complaint   Patient presents with   • Altered Mental Status     Pt was seen here today for fall and is back due to altered mental status.     History provided by: pt and   History is limited by: N/A     History of Present Illness:  Patient is a 66 y.o. year old female that presents to the emergency department with AMS.    Pt was in the ER yesterday with a bladder infection and UTI where she started medication and was not able to walk or stand, but she was stable. Upon discharge, pt fell on the hardwood floor and hit her left temple and left side of her body. After the fall, pt was not responsive for about ten seconds and started to bleed. Pt has not been herself and has not made any sense with her speech. Around 12am today, pt started crying and screaming.   Pt had a stroke in 2019.      History provided by:  Patient and spouse   used: No    Altered Mental Status  Presenting symptoms: confusion    Severity:  Moderate  Most recent episode:  Yesterday  Episode history:  Continuous  Duration:  2 days  Timing:  Constant  Progression:  Worsening  Chronicity:  New  Context: not alcohol use, not dementia, not drug use, not head injury, not homeless, taking medications as prescribed, not nursing home resident, not recent change in medication, not recent illness and not recent infection    Associated symptoms: no abdominal pain, no fever, no headaches, no light-headedness, no nausea, no rash, no seizures, no vomiting and no weakness        Similar Symptoms Previously: none  Recently seen: recently seen in this ED (9/7/2021)    Patient Care Team  Primary Care Provider: Rabia Logan APRN    Past Medical History:     Allergies   Allergen Reactions   • Levofloxacin Other (See Comments)     Joint pain   • Cefuroxime Axetil Rash   • Contrast Dye Rash   • Pravachol [Pravastatin] Other (See Comments)     muscle aches and  cramping     Past Medical History:   Diagnosis Date   • Arthritis    • Bladder problem    • Cerebrovascular disease 06/11/2015   • Dementia (CMS/HCC) 07/23/2018    3yrs memory change, neuropsych test 2015.on aricept switched to memantine. MRI of brain revealed few MILD scattered FLAIR hyperintensities. Reluctant to attribute to memory issues. noted to have atrophy of the bitemporal region a component of Alzheimer's dementia. Refer for repeat neuropsych testing. titrate her memory/ 10 mg twice per day   • Hepatitis A    • Hypertension    • Hypomagnesemia 01/11/2016   • Hypothyroidism 05/09/2015   • Leg pain    • Memory loss 05/09/2015   • Microhematuria    • Muscle cramps    • Obsessive compulsive disorder    • Pap smear for cervical cancer screening     no longer   • Postmenopausal syndrome 02/01/2017   • Thyroid disease    • Vascular dementia (CMS/HCC) 08/10/2015   • Vascular disease, peripheral (CMS/HCC)    • Vitamin D deficiency      Past Surgical History:   Procedure Laterality Date   • BACK SURGERY     • BLADDER REPAIR  2000   • COLONOSCOPY  06/2020    POLYP   • CYSTOSCOPY  05/28/2020   • HYSTERECTOMY      partial   • LAMINECTOMY  1989    L5-S1   • TUBAL ABDOMINAL LIGATION  1979     Family History   Problem Relation Age of Onset   • Hypertension Mother    • Cancer Mother    • Heart disease Father    • Hypertension Father    • Diabetes Father    • Cancer Father    • Lung cancer Brother    • Stomach cancer Other         Aunt   • Lung cancer Other         uncle       Home Medications:  Prior to Admission medications    Medication Sig Start Date End Date Taking? Authorizing Provider   diphenoxylate-atropine (LOMOTIL) 2.5-0.025 MG per tablet Take 2 tablets by mouth 2 (two) times a day.    Provider, MD Mere   donepezil (ARICEPT) 23 MG tablet Take 1 tablet by mouth Every Night. 7/9/21   Rabia Logan APRN   estradiol (CLIMARA) 0.025 MG/24HR patch Place 1 patch on the skin as directed by provider 1  (One) Time Per Week. 7/9/21   Rabia Logan APRN   hydrOXYzine (ATARAX) 10 MG tablet Take 1 tablet by mouth every night at bedtime. 7/9/21   Rabia Logan APRN   levothyroxine (SYNTHROID, LEVOTHROID) 50 MCG tablet Take 1 tablet by mouth Daily. 7/9/21   Rabia Logan APRN   memantine (Namenda) 10 MG tablet Take 1 tablet by mouth 2 (Two) Times a Day. 7/9/21   Rabia Logan APRN   Mirabegron ER (Myrbetriq) 50 MG tablet sustained-release 24 hour 24 hr tablet Samples [id number] exp 11/22 1 box 4/23/21   Provider, MD Mere   sulfamethoxazole-trimethoprim (BACTRIM DS,SEPTRA DS) 800-160 MG per tablet Take 1 tablet by mouth 2 (Two) Times a Day. 9/7/21   Ashkan Gaming DO   vitamin D (ERGOCALCIFEROL) 1.25 MG (04209 UT) capsule capsule Take 1 capsule by mouth 1 (One) Time Per Week. 7/9/21   Rabia Logan APRN        Social History:   Social History     Tobacco Use   • Smoking status: Never Smoker   Substance Use Topics   • Alcohol use: Never     Comment: does not drink   • Drug use: Never     Recent travel: not applicable     Review of Systems:  Review of Systems   Constitutional: Negative for chills and fever.   HENT: Negative for congestion, ear pain, rhinorrhea, sore throat, tinnitus and trouble swallowing.    Eyes: Negative for photophobia and pain.   Respiratory: Negative for choking and shortness of breath.    Gastrointestinal: Negative for abdominal pain, diarrhea, nausea and vomiting.   Endocrine: Negative for polydipsia.   Genitourinary: Negative for difficulty urinating, dysuria and hematuria.   Musculoskeletal: Positive for gait problem. Negative for back pain, joint swelling and neck pain.   Skin: Negative for rash.   Neurological: Positive for speech difficulty. Negative for dizziness, seizures, weakness, light-headedness, numbness and headaches.   Hematological: Negative for adenopathy.   Psychiatric/Behavioral: Positive for confusion. Negative for  "behavioral problems, self-injury and suicidal ideas.        Physical Exam:  /64   Pulse 66   Temp 99.7 °F (37.6 °C) (Oral)   Resp 20   Ht 172.7 cm (68\")   SpO2 98%   BMI 26.88 kg/m²     Physical Exam  Vitals and nursing note reviewed.   Constitutional:       General: She is awake.      Appearance: Normal appearance.   HENT:      Head: Normocephalic and atraumatic.      Right Ear: External ear normal.      Left Ear: External ear normal.      Nose: Nose normal.      Mouth/Throat:      Mouth: Mucous membranes are moist.      Pharynx: Oropharynx is clear.   Eyes:      General: Lids are normal.      Extraocular Movements: Extraocular movements intact.      Conjunctiva/sclera: Conjunctivae normal.      Pupils: Pupils are equal, round, and reactive to light.   Cardiovascular:      Rate and Rhythm: Normal rate and regular rhythm.      Pulses: Normal pulses.      Heart sounds: Normal heart sounds.   Pulmonary:      Effort: Pulmonary effort is normal.      Breath sounds: Normal breath sounds and air entry.   Abdominal:      Palpations: Abdomen is soft.      Tenderness: There is abdominal tenderness in the right lower quadrant and left lower quadrant.   Musculoskeletal:         General: Normal range of motion.      Cervical back: Full passive range of motion without pain, normal range of motion and neck supple.   Skin:     General: Skin is warm and dry.   Neurological:      General: No focal deficit present.      Mental Status: She is alert and oriented to person, place, and time. Mental status is at baseline.      Cranial Nerves: Cranial nerves are intact.      Sensory: Sensation is intact.      Motor: Motor function is intact.      Coordination: Coordination is intact.   Psychiatric:         Attention and Perception: Attention and perception normal.         Mood and Affect: Mood and affect normal.         Speech: Speech normal.         Behavior: Behavior normal. Behavior is cooperative.         Thought Content: " Thought content normal.         Cognition and Memory: Cognition and memory normal.                Medications in the Emergency Department:  Medications   piperacillin-tazobactam (ZOSYN) 3.375 g in iso-osmotic dextrose 50 ml (premix) (0 g Intravenous Stopped 9/8/21 0558)   sodium chloride 0.9 % bolus 1,000 mL (0 mL Intravenous Stopped 9/8/21 0651)        Labs  Lab Results (last 24 hours)     Procedure Component Value Units Date/Time    Blood Culture - Blood, Arm, Left [[id number]] Collected: 09/07/21 0759    Specimen: Blood from Arm, Left Updated: 09/07/21 0806    Blood Culture - Blood, Arm, Right [[id number]] Collected: 09/07/21 0818    Specimen: Blood from Arm, Right Updated: 09/07/21 0831    Lactic Acid, Plasma [[id number]]  (Normal) Collected: 09/07/21 0818    Specimen: Blood Updated: 09/07/21 0851     Lactate 1.9 mmol/L     Blood Culture - Blood, Blood, Venous Line [[id number]] Collected: 09/08/21 0351    Specimen: Blood, Venous Line Updated: 09/08/21 0403    CBC & Differential [[id number]]  (Abnormal) Collected: 09/08/21 0352    Specimen: Blood Updated: 09/08/21 0407    Narrative:      The following orders were created for panel order CBC & Differential.  Procedure                               Abnormality         Status                     ---------                               -----------         ------                     CBC Auto Differential[[id number]]        Abnormal            Final result                 Please view results for these tests on the individual orders.    Lactic Acid, Plasma [[id number]]  (Abnormal) Collected: 09/08/21 0352    Specimen: Blood Updated: 09/08/21 0458     Lactate 2.3 mmol/L     Magnesium [[id number]]  (Normal) Collected: 09/08/21 0352    Specimen: Blood Updated: 09/08/21 0434     Magnesium 2.0 mg/dL     Comprehensive Metabolic Panel [[id number]]  (Abnormal) Collected: 09/08/21 0352    Specimen: Blood Updated: 09/08/21 0434     Glucose 133 mg/dL      BUN 16 mg/dL      Creatinine 1.18  mg/dL      Sodium 140 mmol/L      Potassium 4.0 mmol/L      Chloride 105 mmol/L      CO2 21.3 mmol/L      Calcium 9.4 mg/dL      Total Protein 7.5 g/dL      Albumin 4.20 g/dL      ALT (SGPT) 14 U/L      AST (SGOT) 28 U/L      Alkaline Phosphatase 74 U/L      Total Bilirubin 0.5 mg/dL      eGFR Non African Amer 46 mL/min/1.73      Globulin 3.3 gm/dL      A/G Ratio 1.3 g/dL      BUN/Creatinine Ratio 13.6     Anion Gap 13.7 mmol/L     Narrative:      GFR Normal >60  Chronic Kidney Disease <60  Kidney Failure <15      Lipase [[id number]]  (Normal) Collected: 09/08/21 0352    Specimen: Blood Updated: 09/08/21 0434     Lipase 27 U/L     CBC Auto Differential [[id number]]  (Abnormal) Collected: 09/08/21 0352    Specimen: Blood Updated: 09/08/21 0407     WBC 10.18 10*3/mm3      RBC 4.28 10*6/mm3      Hemoglobin 13.6 g/dL      Hematocrit 41.4 %      MCV 96.7 fL      MCH 31.8 pg      MCHC 32.9 g/dL      RDW 13.2 %      RDW-SD 47.1 fl      MPV 9.1 fL      Platelets 237 10*3/mm3      Neutrophil % 69.7 %      Lymphocyte % 18.1 %      Monocyte % 9.8 %      Eosinophil % 1.3 %      Basophil % 0.5 %      Immature Grans % 0.6 %      Neutrophils, Absolute 7.10 10*3/mm3      Lymphocytes, Absolute 1.84 10*3/mm3      Monocytes, Absolute 1.00 10*3/mm3      Eosinophils, Absolute 0.13 10*3/mm3      Basophils, Absolute 0.05 10*3/mm3      Immature Grans, Absolute 0.06 10*3/mm3      nRBC 0.0 /100 WBC     Urinalysis With Culture If Indicated - Urine, Clean Catch [[id number]]  (Abnormal) Collected: 09/08/21 0459    Specimen: Urine, Clean Catch Updated: 09/08/21 0552     Color, UA Yellow     Appearance, UA Cloudy     pH, UA 7.0     Specific Gravity, UA 1.017     Glucose, UA Negative     Ketones, UA Negative     Bilirubin, UA Negative     Blood, UA Trace     Protein, UA 30 mg/dL (1+)     Leuk Esterase, UA Large (3+)     Nitrite, UA Negative     Urobilinogen, UA 0.2 E.U./dL    Urinalysis, Microscopic Only - Urine, Clean Catch [[id number]]  (Abnormal)  Collected: 09/08/21 0459    Specimen: Urine, Clean Catch Updated: 09/08/21 0552     RBC, UA None Seen /HPF      WBC, UA Too Numerous to Count /HPF      Bacteria, UA 2+ /HPF      Squamous Epithelial Cells, UA 7-12 /HPF      Hyaline Casts, UA None Seen /LPF      Methodology Manual Light Microscopy    Urine Culture - Urine, Urine, Clean Catch [[id number]] Collected: 09/08/21 0459    Specimen: Urine, Clean Catch Updated: 09/08/21 0552           Imaging:  CT Abdomen Pelvis Without Contrast    Result Date: 9/7/2021  PROCEDURE: CT ABDOMEN PELVIS WO CONTRAST  COMPARISON: [facility], CT, CT CHEST ABD PEL W CONTRAST, 1/28/2020, 12:13.  INDICATIONS: GENERALIZED LOW ABDOMEN PAIN  TECHNIQUE: CT images were created without intravenous contrast.   PROTOCOL:   Standard imaging protocol performed    RADIATION:   DLP: 512.7mGy*cm   Automated exposure control was utilized to minimize radiation dose.  FINDINGS:  The lung bases are clear.  The unenhanced liver, gallbladder, adrenal glands, kidneys, spleen, and pancreas are unremarkable.  The stomach appears normal.  The small bowel appears normal in caliber and configuration.  There is sigmoid diverticulosis.  The appendix appears normal.  There is no ascites or loculated collection.  No abnormally enlarged lymph nodes are identified.  The rectum is unremarkable.  The uterus is surgically absent.  There is some wall thickening of the  urinary bladder.  No aggressive osseous lesions are identified.  CONCLUSION:  1. Some wall thickening of urinary bladder.  This could be secondary to under distention or possible cystitis. 2. Sigmoid diverticulosis.     HONG JACOBO MD       Electronically Signed and Approved By: HONG JACOBO MD on 9/07/2021 at 8:33             XR Femur 2 View Left    Result Date: 9/7/2021  PROCEDURE: XR FEMUR 2 VW LEFT  COMPARISON: None  INDICATIONS: left femur pain, no known injury  FINDINGS:  No fractures are visualized.  No lytic or sclerotic bone  lesions are seen.  Mild degenerative change consistent with osteoarthritis is seen in the hip and knee.  CONCLUSION:  Left femur series demonstrating no acute bony abnormality.      AYLIN WALLS MD       Electronically Signed and Approved By: AYLIN WALLS MD on 9/07/2021 at 8:06             CT Head Without Contrast    Result Date: 9/7/2021  PROCEDURE: CT HEAD WO CONTRAST  COMPARISON:  [facility], CT, HEAD W/O CONTRAST, 10/02/2019, 19:40. INDICATIONS: AMS/FELL/HIT LEFT TEMPLE AREA OF HEAD  PROTOCOL:   Standard imaging protocol performed    RADIATION:   DLP: 1018.2mGy*cm   MA and/or KV was adjusted to minimize radiation dose.     TECHNIQUE: After obtaining the patient's consent, CT images were obtained without non-ionic intravenous contrast material.  FINDINGS:  There is no acute intracranial hemorrhage or extra-axial collection. The ventricles appear normal in caliber, with no evidence of mass effect or midline shift. The basal cisterns are patent. The gray-white differentiation is preserved.  Scattered foci of periventricular and subcortical white matter hypodensities are nonspecific, but likely the sequela mild chronic small vessel ischemic disease.  The calvarium is intact. The paranasal sinuses and mastoid air cells are well-aerated.  CONCLUSION:  1. No acute intracranial process or calvarial fracture identified. 2. Findings suggestive of mild chronic small vessel ischemic disease.     HONG JACOBO MD       Electronically Signed and Approved By: HONG JACOBO MD on 9/07/2021 at 14:18             XR Chest 1 View    Result Date: 9/7/2021  PROCEDURE: XR CHEST 1 VW  COMPARISON: [facility], CR, XR CHEST 1 VW, 9/07/2021, 6:22.  INDICATIONS: CHEST PAIN AFTER FALL  FINDINGS:  Heart size unchanged.  No dense consolidation.  No pleural fluid or pneumothorax.  CONCLUSION: No acute change from earlier today       STARR GARCIA MD       Electronically Signed and Approved By: STARR GARCIA  MD on 9/07/2021 at 14:56             XR Hip With or Without Pelvis 2 - 3 View Left    Result Date: 9/7/2021  PROCEDURE: XR HIP W OR WO PELVIS 2-3 VIEW LEFT  COMPARISON: [facility], CR, LEFT HIP/PELVIS, 3/09/2005, 15:27.  INDICATIONS: LEFT HIP PAIN AFTER FALL TODAY  FINDINGS:  No fracture.  No dislocation.  Moderate bilateral hip arthrosis.  Degenerative changes in the included lower lumbar spine.  CONCLUSION: Moderate hip arthrosis.  No acute findings      STARR GARCIA MD       Electronically Signed and Approved By: STARR GARCIA MD on 9/07/2021 at 14:46               Procedures:  Procedures    Progress                            Medical Decision Making:  MDM  Number of Diagnoses or Management Options  Diagnosis management comments: Patient is afebrile nontoxic-appearing.  Vital signs stable.  At time of evaluation she is lying in bed in no acute distress.  Family report worsening confusion she does have a history of dementia.  She was just recently seen and diagnosed with a urinary tract infection.  Patient was sent home had a fall and has become more confused.  She is unable to ambulate.  They report generalized weakness.  She has no focal neurological deficits.  Patient given dose of antibiotics in the emergency department.  With finding urinary tract infection, worsening confusion and inability to ambulate discussed patient with hospitalist and she will be admitted for further care.       Amount and/or Complexity of Data Reviewed  Clinical lab tests: ordered and reviewed  Tests in the radiology section of CPT®: ordered and reviewed  Tests in the medicine section of CPT®: reviewed and ordered  Review and summarize past medical records: yes  Independent visualization of images, tracings, or specimens: yes    Risk of Complications, Morbidity, and/or Mortality  Presenting problems: moderate  Diagnostic procedures: moderate  Management options: moderate         Final diagnoses:   Urinary tract infection  without hematuria, site unspecified   Confusion   Generalized weakness        Disposition:  ED Disposition     ED Disposition Condition Comment    Decision to Admit  Level of Care: Telemetry [5]   Diagnosis: UTI (urinary tract infection) [153149]   Admitting Physician: ADONAY AIKEN [[id number]]   Attending Physician: ADONAY AIKEN [[id number]]   Isolate for COVID?: No [0]   Certification: I Certify That Inpatient Hospital Services Are Medically Necessary For Greater Than 2 Midnights            This medical record created using voice recognition software and may contain unintended errors.    Documentation assistance provided by Indiana Crawley acting as scribe for Ami Hendrickson MD. Information recorded by the scribe was done at my direction and has been verified and validated by me.          Indiana Crawley  09/08/21 0415       Ami Hendrickson MD  09/08/21 0138

## 2021-09-08 NOTE — H&P
[facility]   HOSPITALIST HISTORY AND PHYSICAL  Date: 2021   Patient Name: Marissa Rob  : 1955  MRN: [id number]  Primary Care Physician:  Rabia Logan APRN  Date of admission: 2021    Subjective   Subjective     Chief Complaint: Altered mental status    HPI:    Marissa Rob is a 66 y.o. female with history of dementia, remote CVA, hypertension, and hypothyroidism who has visited our emergency department 3 times in the past 2 days.  She was diagnosed with a urinary tract infection yesterday and was discharged home on Bactrim.  She lives at home with her  who typically cares for her.  Since she was discharged home after having a urinary tract infection on oral antibiotics, the patient has had several falls at home and the  is having difficulty caring for her at home given her falls, he brought her back to the emergency department.    At time of my evaluation the patient states that she still has some suprapubic pain.  She states that she feels chilled.  Otherwise no acute complaints.    In the emergency department, patient's vitals were stable.  Laboratory evaluation revealed lactic acidosis of 2.3.  UA still concerning for infection.  Chest x-ray and head CT clear.  Because the above, the hospitalist team was contacted to admit the patient for further management.    Personal History     Past Medical History:  Past Medical History:   Diagnosis Date   • Arthritis    • Bladder problem    • Cerebrovascular disease 2015   • Dementia (CMS/[facility], located within [facility]) 2018    3yrs memory change, neuropsych test .on aricept switched to memantine. MRI of brain revealed few MILD scattered FLAIR hyperintensities. Reluctant to attribute to memory issues. noted to have atrophy of the bitemporal region a component of Alzheimer's dementia. Refer for repeat neuropsych testing. titrate her memory/ 10 mg twice per day   • Hepatitis A    • Hypertension    • Hypomagnesemia 2016    • Hypothyroidism 05/09/2015   • Leg pain    • Memory loss 05/09/2015   • Microhematuria    • Muscle cramps    • Obsessive compulsive disorder    • Pap smear for cervical cancer screening     no longer   • Postmenopausal syndrome 02/01/2017   • Thyroid disease    • Vascular dementia (CMS/HCC) 08/10/2015   • Vascular disease, peripheral (CMS/HCC)    • Vitamin D deficiency        Past Surgical History:  Past Surgical History:   Procedure Laterality Date   • BACK SURGERY     • BLADDER REPAIR  2000   • COLONOSCOPY  06/2020    POLYP   • CYSTOSCOPY  05/28/2020   • HYSTERECTOMY      partial   • LAMINECTOMY  1989    L5-S1   • TUBAL ABDOMINAL LIGATION  1979       Family History:   Family History   Problem Relation Age of Onset   • Hypertension Mother    • Cancer Mother    • Heart disease Father    • Hypertension Father    • Diabetes Father    • Cancer Father    • Lung cancer Brother    • Stomach cancer Other         Aunt   • Lung cancer Other         uncle       Social History:    reports that she has never smoked. She does not have any smokeless tobacco history on file. She reports that she does not drink alcohol and does not use drugs.    Home Medications:  Mirabegron ER, diphenoxylate-atropine, donepezil, estradiol, hydrOXYzine, levothyroxine, memantine, sulfamethoxazole-trimethoprim, and vitamin D    Allergies:  Allergies   Allergen Reactions   • Levofloxacin Other (See Comments)     Joint pain   • Cefuroxime Axetil Rash   • Contrast Dye Rash   • Pravachol [Pravastatin] Other (See Comments)     muscle aches and cramping       Review of Systems   All systems were reviewed and negative except for: Those listed in the HPI    Objective   Objective     Vitals:   Temp:  [99.7 °F (37.6 °C)] 99.7 °F (37.6 °C)  Heart Rate:  [70-72] 70  Resp:  [20] 20  BP: (155-189)/(73-91) 189/91    Physical Exam    Constitutional: Awake, alert, no acute distress   Eyes: Pupils equal, sclerae anicteric, no conjunctival injection   HENT: NCAT,  mucous membranes moist   Neck: Supple, no thyromegaly, no lymphadenopathy, trachea midline   Respiratory: Clear to auscultation bilaterally, nonlabored respirations    Cardiovascular: RRR, no murmurs, rubs, or gallops, palpable pedal pulses bilaterally   Gastrointestinal: Positive bowel sounds, soft, suprapubic tenderness palpation, distended bladder   Musculoskeletal: No bilateral ankle edema, no clubbing or cyanosis to extremities   Psychiatric: Appropriate affect, cooperative   Neurologic: Oriented x 1, strength symmetric in all extremities, Cranial Nerves grossly intact to confrontation, speech clear   Skin: No rashes     Imaging:   CT Abdomen Pelvis Without Contrast    Result Date: 9/7/2021  PROCEDURE: CT ABDOMEN PELVIS WO CONTRAST  COMPARISON: [facility], CT, CT CHEST ABD PEL W CONTRAST, 1/28/2020, 12:13.  INDICATIONS: GENERALIZED LOW ABDOMEN PAIN  TECHNIQUE: CT images were created without intravenous contrast.   PROTOCOL:   Standard imaging protocol performed    RADIATION:   DLP: 512.7mGy*cm   Automated exposure control was utilized to minimize radiation dose.  FINDINGS:  The lung bases are clear.  The unenhanced liver, gallbladder, adrenal glands, kidneys, spleen, and pancreas are unremarkable.  The stomach appears normal.  The small bowel appears normal in caliber and configuration.  There is sigmoid diverticulosis.  The appendix appears normal.  There is no ascites or loculated collection.  No abnormally enlarged lymph nodes are identified.  The rectum is unremarkable.  The uterus is surgically absent.  There is some wall thickening of the  urinary bladder.  No aggressive osseous lesions are identified.  CONCLUSION:  1. Some wall thickening of urinary bladder.  This could be secondary to under distention or possible cystitis. 2. Sigmoid diverticulosis.     HONG JACOBO MD       Electronically Signed and Approved By: HONG JACOBO MD on 9/07/2021 at 8:33             XR Femur 2 View  Left    Result Date: 9/7/2021  PROCEDURE: XR FEMUR 2 VW LEFT  COMPARISON: None  INDICATIONS: left femur pain, no known injury  FINDINGS:  No fractures are visualized.  No lytic or sclerotic bone lesions are seen.  Mild degenerative change consistent with osteoarthritis is seen in the hip and knee.  CONCLUSION:  Left femur series demonstrating no acute bony abnormality.      AYLIN WALLS MD       Electronically Signed and Approved By: AYLIN WALLS MD on 9/07/2021 at 8:06             CT Head Without Contrast    Result Date: 9/7/2021  PROCEDURE: CT HEAD WO CONTRAST  COMPARISON:  [facility], CT, HEAD W/O CONTRAST, 10/02/2019, 19:40. INDICATIONS: AMS/FELL/HIT LEFT TEMPLE AREA OF HEAD  PROTOCOL:   Standard imaging protocol performed    RADIATION:   DLP: 1018.2mGy*cm   MA and/or KV was adjusted to minimize radiation dose.     TECHNIQUE: After obtaining the patient's consent, CT images were obtained without non-ionic intravenous contrast material.  FINDINGS:  There is no acute intracranial hemorrhage or extra-axial collection. The ventricles appear normal in caliber, with no evidence of mass effect or midline shift. The basal cisterns are patent. The gray-white differentiation is preserved.  Scattered foci of periventricular and subcortical white matter hypodensities are nonspecific, but likely the sequela mild chronic small vessel ischemic disease.  The calvarium is intact. The paranasal sinuses and mastoid air cells are well-aerated.  CONCLUSION:  1. No acute intracranial process or calvarial fracture identified. 2. Findings suggestive of mild chronic small vessel ischemic disease.     HONG JACOBO MD       Electronically Signed and Approved By: HONG JACOBO MD on 9/07/2021 at 14:18             XR Chest 1 View    Result Date: 9/7/2021  PROCEDURE: XR CHEST 1 VW  COMPARISON: [facility], CR, XR CHEST 1 VW, 9/07/2021, 6:22.  INDICATIONS: CHEST PAIN AFTER FALL  FINDINGS:  Heart size  unchanged.  No dense consolidation.  No pleural fluid or pneumothorax.  CONCLUSION: No acute change from earlier today       STARR GARCIA MD       Electronically Signed and Approved By: STARR GARCIA MD on 9/07/2021 at 14:56             XR Chest 1 View    Result Date: 9/7/2021  PROCEDURE: XR CHEST 1 VW  COMPARISON: [facility], , CHEST AP/PA 1 VIEW, 10/02/2019, 19:57.  INDICATIONS: CHEST PAIN TRIAGE PROTOCOL.  FINDINGS:A single AP upright portable chest radiograph was performed.  No cardiac enlargement is seen.  No acute infiltrate is appreciated.  No pleural effusion or pneumothorax is identified.  External artifacts obscure detail.  There may be fibrotic changes of the bilateral lung bases, seen previously. Chronic calcified granulomatous disease involves the chest.  The thoracic aorta is atherosclerotic.  No significant interval change is seen since the prior study.  CONCLUSION:No acute infiltrate is appreciated.       RENAN SKELTON JR, MD       Electronically Signed and Approved By: RENAN SKELTON JR, MD on 9/07/2021 at 6:40             XR Hip With or Without Pelvis 2 - 3 View Left    Result Date: 9/7/2021  PROCEDURE: XR HIP W OR WO PELVIS 2-3 VIEW LEFT  COMPARISON: [facility], , LEFT HIP/PELVIS, 3/09/2005, 15:27.  INDICATIONS: LEFT HIP PAIN AFTER FALL TODAY  FINDINGS:  No fracture.  No dislocation.  Moderate bilateral hip arthrosis.  Degenerative changes in the included lower lumbar spine.  CONCLUSION: Moderate hip arthrosis.  No acute findings      STARR GARCIA MD       Electronically Signed and Approved By: STARR GARCIA MD on 9/07/2021 at 14:46               Result Review    Result Review:  I have personally reviewed the results from the time of this admission to 9/8/2021 05:22 EDT and agree with these findings:  [x]  Laboratory  []  Microbiology  [x]  Radiology  []  EKG/Telemetry   []  Cardiology/Vascular   []  Pathology  [x]  Old records  []  Other:      Assessment/Plan    Assessment / Plan     Assessment:   Urinary tract infection  Metabolic encephalopathy due to above  Vascular dementia  Remote CVA  Essential hypertension  Hypothyroidism    Plan:    Admit to hospitalist service  Labs and imaging reviewed  Continue Zosyn, follow urine culture  Start IV fluids  Discontinue the Bactrim that the patient was discharged home with yesterday  Fall precautions  Patient appears to be retaining urine on my exam.  Will bladder scan and straight cath as necessary.  Reconcile and resume appropriate home meds    Patient's clinical course will dictate further management  Discussed with ED physician, RN        DVT prophylaxis:  No DVT prophylaxis order currently exists.    CODE STATUS:         Admission Status:  I believe this patient meets patient status.    Electronically signed by DAVIDSON Hernandez, 09/08/21, 5:22 AM EDT.

## 2021-09-09 LAB
ANION GAP SERPL CALCULATED.3IONS-SCNC: 12.6 MMOL/L (ref 5–15)
BACTERIA SPEC AEROBE CULT: NORMAL
BASOPHILS # BLD AUTO: 0.1 10*3/MM3 (ref 0–0.2)
BASOPHILS NFR BLD AUTO: 1 % (ref 0–1.5)
BUN SERPL-MCNC: 12 MG/DL (ref 8–23)
BUN/CREAT SERPL: 10.3 (ref 7–25)
CALCIUM SPEC-SCNC: 8.9 MG/DL (ref 8.6–10.5)
CHLORIDE SERPL-SCNC: 103 MMOL/L (ref 98–107)
CO2 SERPL-SCNC: 21.4 MMOL/L (ref 22–29)
CREAT SERPL-MCNC: 1.16 MG/DL (ref 0.57–1)
D-LACTATE SERPL-SCNC: 2.7 MMOL/L (ref 0.5–2)
DEPRECATED RDW RBC AUTO: 48 FL (ref 37–54)
EOSINOPHIL # BLD AUTO: 0.35 10*3/MM3 (ref 0–0.4)
EOSINOPHIL NFR BLD AUTO: 3.6 % (ref 0.3–6.2)
ERYTHROCYTE [DISTWIDTH] IN BLOOD BY AUTOMATED COUNT: 13.3 % (ref 12.3–15.4)
GFR SERPL CREATININE-BSD FRML MDRD: 47 ML/MIN/1.73
GLUCOSE SERPL-MCNC: 134 MG/DL (ref 65–99)
HCT VFR BLD AUTO: 42.2 % (ref 34–46.6)
HGB BLD-MCNC: 13.5 G/DL (ref 12–15.9)
IMM GRANULOCYTES # BLD AUTO: 0.08 10*3/MM3 (ref 0–0.05)
IMM GRANULOCYTES NFR BLD AUTO: 0.8 % (ref 0–0.5)
LYMPHOCYTES # BLD AUTO: 2.18 10*3/MM3 (ref 0.7–3.1)
LYMPHOCYTES NFR BLD AUTO: 22.5 % (ref 19.6–45.3)
MAGNESIUM SERPL-MCNC: 2 MG/DL (ref 1.6–2.4)
MCH RBC QN AUTO: 31.6 PG (ref 26.6–33)
MCHC RBC AUTO-ENTMCNC: 32 G/DL (ref 31.5–35.7)
MCV RBC AUTO: 98.8 FL (ref 79–97)
MONOCYTES # BLD AUTO: 1.04 10*3/MM3 (ref 0.1–0.9)
MONOCYTES NFR BLD AUTO: 10.7 % (ref 5–12)
NEUTROPHILS NFR BLD AUTO: 5.93 10*3/MM3 (ref 1.7–7)
NEUTROPHILS NFR BLD AUTO: 61.4 % (ref 42.7–76)
NRBC BLD AUTO-RTO: 0 /100 WBC (ref 0–0.2)
PLATELET # BLD AUTO: 218 10*3/MM3 (ref 140–450)
PMV BLD AUTO: 9 FL (ref 6–12)
POTASSIUM SERPL-SCNC: 4 MMOL/L (ref 3.5–5.2)
RBC # BLD AUTO: 4.27 10*6/MM3 (ref 3.77–5.28)
SODIUM SERPL-SCNC: 137 MMOL/L (ref 136–145)
WBC # BLD AUTO: 9.68 10*3/MM3 (ref 3.4–10.8)

## 2021-09-09 PROCEDURE — 80048 BASIC METABOLIC PNL TOTAL CA: CPT | Performed by: PHYSICIAN ASSISTANT

## 2021-09-09 PROCEDURE — 85025 COMPLETE CBC W/AUTO DIFF WBC: CPT | Performed by: PHYSICIAN ASSISTANT

## 2021-09-09 PROCEDURE — 36415 COLL VENOUS BLD VENIPUNCTURE: CPT | Performed by: PHYSICIAN ASSISTANT

## 2021-09-09 PROCEDURE — 99233 SBSQ HOSP IP/OBS HIGH 50: CPT | Performed by: INTERNAL MEDICINE

## 2021-09-09 PROCEDURE — 83605 ASSAY OF LACTIC ACID: CPT | Performed by: PHYSICIAN ASSISTANT

## 2021-09-09 PROCEDURE — 83735 ASSAY OF MAGNESIUM: CPT | Performed by: PHYSICIAN ASSISTANT

## 2021-09-09 PROCEDURE — 25010000002 PIPERACILLIN SOD-TAZOBACTAM PER 1 G: Performed by: PHYSICIAN ASSISTANT

## 2021-09-09 RX ORDER — ACETAMINOPHEN 325 MG/1
650 TABLET ORAL EVERY 4 HOURS PRN
Status: DISCONTINUED | OUTPATIENT
Start: 2021-09-09 | End: 2021-09-12 | Stop reason: HOSPADM

## 2021-09-09 RX ADMIN — MEMANTINE 10 MG: 10 TABLET ORAL at 20:18

## 2021-09-09 RX ADMIN — TAZOBACTAM SODIUM AND PIPERACILLIN SODIUM 3.38 G: 375; 3 INJECTION, SOLUTION INTRAVENOUS at 09:20

## 2021-09-09 RX ADMIN — SODIUM CHLORIDE, POTASSIUM CHLORIDE, SODIUM LACTATE AND CALCIUM CHLORIDE 100 ML/HR: 600; 310; 30; 20 INJECTION, SOLUTION INTRAVENOUS at 05:59

## 2021-09-09 RX ADMIN — LEVOTHYROXINE SODIUM 50 MCG: 50 TABLET ORAL at 11:05

## 2021-09-09 RX ADMIN — SODIUM CHLORIDE, PRESERVATIVE FREE 10 ML: 5 INJECTION INTRAVENOUS at 20:18

## 2021-09-09 RX ADMIN — TAZOBACTAM SODIUM AND PIPERACILLIN SODIUM 3.38 G: 375; 3 INJECTION, SOLUTION INTRAVENOUS at 23:15

## 2021-09-09 RX ADMIN — MEMANTINE 10 MG: 10 TABLET ORAL at 11:05

## 2021-09-09 RX ADMIN — DONEPEZIL HYDROCHLORIDE 20 MG: 10 TABLET, FILM COATED ORAL at 20:18

## 2021-09-09 RX ADMIN — TAZOBACTAM SODIUM AND PIPERACILLIN SODIUM 3.38 G: 375; 3 INJECTION, SOLUTION INTRAVENOUS at 00:30

## 2021-09-09 RX ADMIN — SODIUM CHLORIDE, POTASSIUM CHLORIDE, SODIUM LACTATE AND CALCIUM CHLORIDE 100 ML/HR: 600; 310; 30; 20 INJECTION, SOLUTION INTRAVENOUS at 16:09

## 2021-09-09 RX ADMIN — TAZOBACTAM SODIUM AND PIPERACILLIN SODIUM 3.38 G: 375; 3 INJECTION, SOLUTION INTRAVENOUS at 17:12

## 2021-09-09 NOTE — CONSULTS
"Nutrition Services    Patient Name: Marissa Rob  YOB: 1955  MRN: [id number]  Admission date: 9/8/2021      CLINICAL NUTRITION ASSESSMENT      Reason for Assessment  MST score 2+   2 to 13 pound weight loss reported, decreased appetite     H&P:    Past Medical History:   Diagnosis Date   • Arthritis    • Bladder problem    • Cerebrovascular disease 06/11/2015   • Dementia (CMS/HCC) 07/23/2018    3yrs memory change, neuropsych test 2015.on aricept switched to memantine. MRI of brain revealed few MILD scattered FLAIR hyperintensities. Reluctant to attribute to memory issues. noted to have atrophy of the bitemporal region a component of Alzheimer's dementia. Refer for repeat neuropsych testing. titrate her memory/ 10 mg twice per day   • Hepatitis A    • Hypertension    • Hypomagnesemia 01/11/2016   • Hypothyroidism 05/09/2015   • Leg pain    • Memory loss 05/09/2015   • Microhematuria    • Muscle cramps    • Obsessive compulsive disorder    • Pap smear for cervical cancer screening     no longer   • Postmenopausal syndrome 02/01/2017   • Thyroid disease    • Vascular dementia (CMS/HCC) 08/10/2015   • Vascular disease, peripheral (CMS/HCC)    • Vitamin D deficiency         Current Problems:   Active Hospital Problems    Diagnosis    • UTI (urinary tract infection)         Nutrition/Diet History         Narrative     Patient visited after breakfast, she ate about 80 to 90%.  She reports she has lost weight at one time but has gained weight.  Her weight is up 25% over the past year.  Her friend/cousin is at bedside.  She reports patient has dementia.  Used to be an RN at [employer].  Patient has difficulty relating usual intake.  States her  prepares all her meals to make sure she eats 3 times per day.  She tends to like sweet foods.        Anthropometrics        Current Height, Weight Height: 172.7 cm (68\")      Current BMI Body mass index is 26.88 kg/m².       Weight Hx  Wt Readings from Last 30 " Encounters:   09/07/21 1131 80.2 kg (176 lb 12.9 oz)   09/07/21 0453 87.1 kg (192 lb 0.3 oz)   07/09/21 1148 75.7 kg (166 lb 12.8 oz)   01/08/21 0000 64.1 kg (141 lb 4 oz)   10/29/20 0000 62.7 kg (138 lb 2 oz)   07/08/20 0000 55.5 kg (122 lb 6 oz)   02/25/20 0000 56.2 kg (124 lb)   02/24/20 0000 55.9 kg (123 lb 4 oz)   01/22/20 0000 56.3 kg (124 lb 2 oz)   11/14/19 0000 61 kg (134 lb 8 oz)   11/05/19 0000 59.6 kg (131 lb 8 oz)   10/22/19 0000 61 kg (134 lb 8 oz)   10/14/19 0000 62.6 kg (138 lb)   10/11/19 0000 61.9 kg (136 lb 6 oz)   07/16/19 0000 70.1 kg (154 lb 8 oz)   04/16/19 0000 68.9 kg (152 lb)   02/12/19 0000 68.5 kg (151 lb)   12/10/18 0000 70.8 kg (156 lb)   10/15/18 0000 74 kg (163 lb 4 oz)   07/23/18 0000 71.4 kg (157 lb 8 oz)   07/17/18 0000 70.3 kg (155 lb)   07/03/18 0000 68.7 kg (151 lb 8 oz)   02/12/18 0000 72.1 kg (159 lb)   01/09/18 0000 74.4 kg (164 lb 2 oz)            Wt Change Observation  +25%      Estimated/Assessed Needs       Energy Requirements    EST Needs (kcal/day) 1800       Protein Requirements    EST Daily Needs (g/day) 64-77       Fluid Requirements     Estimated Needs (mL/day) 1800ml     Labs/Medications         Pertinent Labs Reviewed.   Results from last 7 days   Lab Units 09/09/21  0446 09/08/21  0352 09/07/21  0516   SODIUM mmol/L 137 140 138   POTASSIUM mmol/L 4.0 4.0 4.1   CHLORIDE mmol/L 103 105 103   CO2 mmol/L 21.4* 21.3* 24.9   BUN mg/dL 12 16 18   CREATININE mg/dL 1.16* 1.18* 1.06*   CALCIUM mg/dL 8.9 9.4 9.3   BILIRUBIN mg/dL  --  0.5 0.3   ALK PHOS U/L  --  74 79   ALT (SGPT) U/L  --  14 11   AST (SGOT) U/L  --  28 18   GLUCOSE mg/dL 134* 133* 133*     Results from last 7 days   Lab Units 09/09/21  0446 09/08/21  0352 09/08/21  0352 09/07/21  0516 09/07/21  0516   MAGNESIUM mg/dL 2.0  --  2.0  --  1.9   HEMOGLOBIN g/dL 13.5   < > 13.6   < > 13.7   HEMATOCRIT % 42.2   < > 41.4   < > 42.3    < > = values in this interval not displayed.     Coronavirus (COVID-19)   Date  Value Ref Range Status   06/11/2020 NOT DETECTED NA Final     Comment:     The SARS-CoV-2 assay is a real-time, RT-PCR test intended  for the qualitative detection of nucleic acid from the  SARS-CoV-2 in respiratory specimens from individuals,  testing performed at [facility] reference lab.       No results found for: HGBA1C      Pertinent Medications Reviewed.     Current Nutrition Orders & Evaluation of Intake       Oral Nutrition     Current PO Diet Diet Regular   Supplement No active supplement orders       Nutrition Diagnosis         Nutrition Dx Problem 1  none at this time       Nutrition Intervention         Continue regular diet, daily meal selections per diet office.     Monitor/Evaluation        Monitor  per protocol       Nutrition Discharge Plan         Regular diet       Electronically signed by:  Ling Gutierrez, EVA  09/09/21 13:46 EDT

## 2021-09-09 NOTE — PLAN OF CARE
Goal Outcome Evaluation:  Plan of Care Reviewed With: patient        Progress: no change  Outcome Summary: Patient has been pleasant this shift. Complaints of pain in lower abdomenal region. Order for walker placement obtained.

## 2021-09-09 NOTE — PLAN OF CARE
Goal Outcome Evaluation:  Plan of Care Reviewed With: patient, spouse        Progress: improving   Plan of Care Reviewed With: patient, spouse   Patient started on antibiotics, has voided, tolerating foods well, vs stable. [place], rn

## 2021-09-09 NOTE — PLAN OF CARE
Goal Outcome Evaluation:  Plan of Care Reviewed With: patient, spouse            Patient started on antibiotics, has voided, tolerating foods well, vs stable. [place], rn

## 2021-09-09 NOTE — PLAN OF CARE
Goal Outcome Evaluation:              Pt alert to self only this shift. VSS. No c/o pain. Voiding well. PVR 0ml. BM this shift. Very anxious when getting to the bedside commode. Several reminders and reassuring required.

## 2021-09-10 LAB
ALBUMIN SERPL-MCNC: 3.6 G/DL (ref 3.5–5.2)
ALBUMIN/GLOB SERPL: 1.4 G/DL
ALP SERPL-CCNC: 66 U/L (ref 39–117)
ALT SERPL W P-5'-P-CCNC: 14 U/L (ref 1–33)
ANION GAP SERPL CALCULATED.3IONS-SCNC: 10 MMOL/L (ref 5–15)
AST SERPL-CCNC: 22 U/L (ref 1–32)
BASOPHILS # BLD AUTO: 0.08 10*3/MM3 (ref 0–0.2)
BASOPHILS NFR BLD AUTO: 0.8 % (ref 0–1.5)
BILIRUB SERPL-MCNC: 0.5 MG/DL (ref 0–1.2)
BUN SERPL-MCNC: 15 MG/DL (ref 8–23)
BUN/CREAT SERPL: 13.9 (ref 7–25)
CALCIUM SPEC-SCNC: 8.7 MG/DL (ref 8.6–10.5)
CHLORIDE SERPL-SCNC: 105 MMOL/L (ref 98–107)
CO2 SERPL-SCNC: 24 MMOL/L (ref 22–29)
CREAT SERPL-MCNC: 1.08 MG/DL (ref 0.57–1)
DEPRECATED RDW RBC AUTO: 47.4 FL (ref 37–54)
EOSINOPHIL # BLD AUTO: 0.3 10*3/MM3 (ref 0–0.4)
EOSINOPHIL NFR BLD AUTO: 2.9 % (ref 0.3–6.2)
ERYTHROCYTE [DISTWIDTH] IN BLOOD BY AUTOMATED COUNT: 13.2 % (ref 12.3–15.4)
GFR SERPL CREATININE-BSD FRML MDRD: 51 ML/MIN/1.73
GLOBULIN UR ELPH-MCNC: 2.6 GM/DL
GLUCOSE SERPL-MCNC: 120 MG/DL (ref 65–99)
HCT VFR BLD AUTO: 38.5 % (ref 34–46.6)
HGB BLD-MCNC: 12.6 G/DL (ref 12–15.9)
IMM GRANULOCYTES # BLD AUTO: 0.06 10*3/MM3 (ref 0–0.05)
IMM GRANULOCYTES NFR BLD AUTO: 0.6 % (ref 0–0.5)
LYMPHOCYTES # BLD AUTO: 2.38 10*3/MM3 (ref 0.7–3.1)
LYMPHOCYTES NFR BLD AUTO: 22.6 % (ref 19.6–45.3)
MAGNESIUM SERPL-MCNC: 2 MG/DL (ref 1.6–2.4)
MCH RBC QN AUTO: 31.7 PG (ref 26.6–33)
MCHC RBC AUTO-ENTMCNC: 32.7 G/DL (ref 31.5–35.7)
MCV RBC AUTO: 97 FL (ref 79–97)
MONOCYTES # BLD AUTO: 0.97 10*3/MM3 (ref 0.1–0.9)
MONOCYTES NFR BLD AUTO: 9.2 % (ref 5–12)
NEUTROPHILS NFR BLD AUTO: 6.73 10*3/MM3 (ref 1.7–7)
NEUTROPHILS NFR BLD AUTO: 63.9 % (ref 42.7–76)
NRBC BLD AUTO-RTO: 0 /100 WBC (ref 0–0.2)
PHOSPHATE SERPL-MCNC: 3.8 MG/DL (ref 2.5–4.5)
PLATELET # BLD AUTO: 221 10*3/MM3 (ref 140–450)
PMV BLD AUTO: 9 FL (ref 6–12)
POTASSIUM SERPL-SCNC: 4 MMOL/L (ref 3.5–5.2)
PROT SERPL-MCNC: 6.2 G/DL (ref 6–8.5)
RBC # BLD AUTO: 3.97 10*6/MM3 (ref 3.77–5.28)
SODIUM SERPL-SCNC: 139 MMOL/L (ref 136–145)
WBC # BLD AUTO: 10.52 10*3/MM3 (ref 3.4–10.8)

## 2021-09-10 PROCEDURE — 25010000002 PIPERACILLIN SOD-TAZOBACTAM PER 1 G: Performed by: PHYSICIAN ASSISTANT

## 2021-09-10 PROCEDURE — 84100 ASSAY OF PHOSPHORUS: CPT | Performed by: INTERNAL MEDICINE

## 2021-09-10 PROCEDURE — 85025 COMPLETE CBC W/AUTO DIFF WBC: CPT | Performed by: INTERNAL MEDICINE

## 2021-09-10 PROCEDURE — 80053 COMPREHEN METABOLIC PANEL: CPT | Performed by: INTERNAL MEDICINE

## 2021-09-10 PROCEDURE — 97166 OT EVAL MOD COMPLEX 45 MIN: CPT

## 2021-09-10 PROCEDURE — 83735 ASSAY OF MAGNESIUM: CPT | Performed by: INTERNAL MEDICINE

## 2021-09-10 PROCEDURE — 97110 THERAPEUTIC EXERCISES: CPT

## 2021-09-10 PROCEDURE — 97161 PT EVAL LOW COMPLEX 20 MIN: CPT

## 2021-09-10 PROCEDURE — 99233 SBSQ HOSP IP/OBS HIGH 50: CPT | Performed by: INTERNAL MEDICINE

## 2021-09-10 RX ADMIN — TAZOBACTAM SODIUM AND PIPERACILLIN SODIUM 3.38 G: 375; 3 INJECTION, SOLUTION INTRAVENOUS at 08:15

## 2021-09-10 RX ADMIN — SODIUM CHLORIDE, POTASSIUM CHLORIDE, SODIUM LACTATE AND CALCIUM CHLORIDE 100 ML/HR: 600; 310; 30; 20 INJECTION, SOLUTION INTRAVENOUS at 16:02

## 2021-09-10 RX ADMIN — MEMANTINE 10 MG: 10 TABLET ORAL at 11:16

## 2021-09-10 RX ADMIN — SODIUM CHLORIDE, PRESERVATIVE FREE 10 ML: 5 INJECTION INTRAVENOUS at 20:44

## 2021-09-10 RX ADMIN — SODIUM CHLORIDE, POTASSIUM CHLORIDE, SODIUM LACTATE AND CALCIUM CHLORIDE 100 ML/HR: 600; 310; 30; 20 INJECTION, SOLUTION INTRAVENOUS at 04:28

## 2021-09-10 RX ADMIN — SODIUM CHLORIDE, PRESERVATIVE FREE 10 ML: 5 INJECTION INTRAVENOUS at 08:15

## 2021-09-10 RX ADMIN — MEMANTINE 10 MG: 10 TABLET ORAL at 20:44

## 2021-09-10 RX ADMIN — LEVOTHYROXINE SODIUM 50 MCG: 50 TABLET ORAL at 11:16

## 2021-09-10 RX ADMIN — DONEPEZIL HYDROCHLORIDE 20 MG: 10 TABLET, FILM COATED ORAL at 20:44

## 2021-09-10 RX ADMIN — TAZOBACTAM SODIUM AND PIPERACILLIN SODIUM 3.38 G: 375; 3 INJECTION, SOLUTION INTRAVENOUS at 16:02

## 2021-09-10 NOTE — PLAN OF CARE
Goal Outcome Evaluation:  Plan of Care Reviewed With: patient           Outcome Summary: patient walked in [place] with PT today, up to bathroomw a few times during day, bladder scan performed, patient has difficulty communicating, hard to find the right words to say, no c/o pain, continue iv antibiotics and fluids

## 2021-09-10 NOTE — PLAN OF CARE
Goal Outcome Evaluation:  Plan of Care Reviewed With: (P) patient        Progress: (P) no change  Outcome Summary: (P) Pt demonstrated limitations in cognition as well as balance. Pt will benefit from skilled therapy services to improve balance and mobility.

## 2021-09-10 NOTE — THERAPY EVALUATION
Patient Name: Marissa Rob  : 1955    MRN: [id number]                              Today's Date: 9/10/2021       Admit Date: 2021    Visit Dx:     ICD-10-CM ICD-9-CM   1. Urinary tract infection without hematuria, site unspecified  N39.0 599.0   2. Confusion  R41.0 298.9   3. Generalized weakness  R53.1 780.79     Patient Active Problem List   Diagnosis   • Arthritis   • Dementia (CMS/HCC)   • Hepatitis A   • Cerebrovascular disease   • Bladder problem   • Hypertension   • Vascular disease, peripheral (CMS/HCC)   • Memory loss   • Microhematuria   • Obsessive-compulsive disorder   • Postmenopausal syndrome   • Hypothyroidism   • Vascular dementia (CMS/HCC)   • Thyroid disease   • Vitamin D deficiency   • UTI (urinary tract infection)     Past Medical History:   Diagnosis Date   • Arthritis    • Bladder problem    • Cerebrovascular disease 2015   • Dementia (CMS/HCC) 2018    3yrs memory change, neuropsych test .on aricept switched to memantine. MRI of brain revealed few MILD scattered FLAIR hyperintensities. Reluctant to attribute to memory issues. noted to have atrophy of the bitemporal region a component of Alzheimer's dementia. Refer for repeat neuropsych testing. titrate her memory/ 10 mg twice per day   • Hepatitis A    • Hypertension    • Hypomagnesemia 2016   • Hypothyroidism 2015   • Leg pain    • Memory loss 2015   • Microhematuria    • Muscle cramps    • Obsessive compulsive disorder    • Pap smear for cervical cancer screening     no longer   • Postmenopausal syndrome 2017   • Thyroid disease    • Vascular dementia (CMS/HCC) 08/10/2015   • Vascular disease, peripheral (CMS/HCC)    • Vitamin D deficiency      Past Surgical History:   Procedure Laterality Date   • BACK SURGERY     • BLADDER REPAIR     • COLONOSCOPY  2020    POLYP   • CYSTOSCOPY  2020   • HYSTERECTOMY      partial   • LAMINECTOMY      L5-S1   • TUBAL ABDOMINAL  LIGATION  1979     General Information     Row Name 09/10/21 1327 09/10/21 1320       OT Time and Intention    Document Type  therapy note (daily note)  -AV  evaluation  -AV    Mode of Treatment  individual therapy;physical therapy  -AV  individual therapy;occupational therapy  -AV    Row Name 09/10/21 1320          General Information    Patient Profile Reviewed  yes  -AV     Prior Level of Function  -- Patient reports assistance with ADLs-specifics unknown as patient is poor historian and information not found in EMR.  -AV     Existing Precautions/Restrictions  fall  -AV     Barriers to Rehab  none identified  -AV     Row Name 09/10/21 1320          Occupational Profile    Reason for Services/Referral (Occupational Profile)  Patient is a 66 year old female admitted to [facility] on September 8, 2021 with altered mental status.  She is currently on fifth floor/room air.   OT consulted due to impaired ADL/transfer independence.  No previous OT services for current condition.  -AV     Row Name 09/10/21 1320          Living Environment    Lives With  spouse  -AV     Row Name 09/10/21 1320          Cognition    Orientation Status (Cognition)  -- Alert, pleasantly confused.  Decreased attention to task.  Repeated directions to follow one-step commands.  History of dementia  -AV     Row Name 09/10/21 1320          Safety Issues, Functional Mobility    Impairments Affecting Function (Mobility)  balance;cognition;endurance/activity tolerance  -AV       User Key  (r) = Recorded By, (t) = Taken By, (c) = Cosigned By    Initials Name Provider Type    AV Bang Lozano, OT Occupational Therapist          Mobility/ADL's     Row Name 09/10/21 1321          Bed Mobility    Bed Mobility  supine-sit;sit-supine  -AV     Supine-Sit [place] (Bed Mobility)  minimum assist (75% patient effort)  -AV     Sit-Supine [place] (Bed Mobility)  independent  -AV     Row Name 09/10/21 1321          Transfers    Transfers   sit-stand transfer  -AV     Sit-Stand [place] (Transfers)  minimum assist (75% patient effort);verbal cues  -AV     Row Name 09/10/21 1321          Activities of Daily Living    BADL Assessment/Intervention  -- (I) feeding with set up and cues.  Min assist bathing, dressing and grooming with set up and cues.  Incontinent-total assistance toilet hygiene  -AV       User Key  (r) = Recorded By, (t) = Taken By, (c) = Cosigned By    Initials Name Provider Type    AV Bang Lozano OT Occupational Therapist        Obj/Interventions     Row Name 09/10/21 1322          Sensory Assessment (Somatosensory)    Sensory Assessment (Somatosensory)  UE sensation intact  -AV     Row Name 09/10/21 1322          Vision Assessment/Intervention    Visual Impairment/Limitations  WFL Glasses  -AV     Row Name 09/10/21 1322          Range of Motion Comprehensive    General Range of Motion  bilateral upper extremity ROM WFL  -AV     Row Name 09/10/21 1322          Strength Comprehensive (MMT)    Comment, General Manual Muscle Testing (MMT) Assessment  4/5 upper extremities  -AV     Row Name 09/10/21 1328          Shoulder (Therapeutic Exercise)    Shoulder (Therapeutic Exercise)  strengthening exercise  -AV     Shoulder Strengthening (Therapeutic Exercise)  bilateral;flexion;horizontal aBduction/aDduction;1 lb free weight  -AV     Row Name 09/10/21 1328          Elbow/Forearm (Therapeutic Exercise)    Elbow/Forearm (Therapeutic Exercise)  strengthening exercise  -AV     Elbow/Forearm Strengthening (Therapeutic Exercise)  bilateral;flexion;extension;supination;pronation;1 lb free weight  -AV     Row Name 09/10/21 1328 09/10/21 1322       Motor Skills    Motor Skills  therapeutic exercise Patient able to complete 5 exercises with 1#resistance (1 set of 15 in high [place] position.  Moderate cues/demonstration required.  -AV  coordination;functional endurance  -AV    Coordination  --  WFL  -AV    Functional Endurance  --  Fair minus  -AV     Row Name 09/10/21 1322          Balance    Balance Assessment  sitting dynamic balance;standing dynamic balance  -AV     Dynamic Sitting Balance  WFL  -AV     Dynamic Standing Balance  mild impairment  -AV     Row Name 09/10/21 1328          Therapeutic Exercise    Therapeutic Exercise  shoulder;elbow/forearm  -AV       User Key  (r) = Recorded By, (t) = Taken By, (c) = Cosigned By    Initials Name Provider Type    Bang Bowman, OT Occupational Therapist        Goals/Plan     Row Name 09/10/21 1324          Transfer Goal 1 (OT)    Activity/Assistive Device (Transfer Goal 1, OT)  transfers, all  -AV     [place] Level/Cues Needed (Transfer Goal 1, OT)  contact guard assist;standby assist;verbal cues required  -AV     Time Frame (Transfer Goal 1, OT)  long term goal (LTG);10 days  -AV     [facility] Name 09/10/21 1324          Bathing Goal 1 (OT)    Activity/Device (Bathing Goal 1, OT)  bathing skills, all  -AV     [place] Level/Cues Needed (Bathing Goal 1, OT)  contact guard assist;standby assist;set-up required;verbal cues required  -AV     Time Frame (Bathing Goal 1, OT)  long term goal (LTG);10 days  -AV     [facility] Name 09/10/21 1324          Dressing Goal 1 (OT)    Activity/Device (Dressing Goal 1, OT)  dressing skills, all  -AV     [place]/Cues Needed (Dressing Goal 1, OT)  contact guard assist;standby assist;set-up required;verbal cues required  -AV     Time Frame (Dressing Goal 1, OT)  long term goal (LTG);10 days  -AV     [facility] Name 09/10/21 1324          Toileting Goal 1 (OT)    Activity/Device (Toileting Goal 1, OT)  toileting skills, all;raised toilet seat  -AV     [place] Level/Cues Needed (Toileting Goal 1, OT)  contact guard assist;standby assist;set-up required;verbal cues required  -AV     Time Frame (Toileting Goal 1, OT)  long term goal (LTG);10 days  -AV     [facility] Name 09/10/21 1324          Grooming Goal 1 (OT)    Activity/Device (Grooming Goal 1, OT)  grooming skills, all  -AV      [place] (Grooming Goal 1, OT)  contact guard assist;standby assist;set-up required;verbal cues required  -AV     Time Frame (Grooming Goal 1, OT)  long term goal (LTG);10 days  -AV     Row Name 09/10/21 1324          Therapy Assessment/Plan (OT)    Planned Therapy Interventions (OT)  activity tolerance training;BADL retraining;functional balance retraining;occupation/activity based interventions;patient/caregiver education/training;transfer/mobility retraining  -AV       User Key  (r) = Recorded By, (t) = Taken By, (c) = Cosigned By    Initials Name Provider Type    AV Bang Lozano, TASHA Occupational Therapist        Clinical Impression     Row Name 09/10/21 1323          Pain Assessment    Additional Documentation  Pain Scale: FACES Pre/Post-Treatment (Group)  -AV     [facility] Name 09/10/21 1323          Pain Scale: FACES Pre/Post-Treatment    Pain: FACES Scale, Pretreatment  0-->no hurt  -AV     Posttreatment Pain Rating  0-->no hurt  -AV     Row Name 09/10/21 1323          Plan of Care Review    Plan of Care Reviewed With  patient  -AV     Progress  no change  -AV     Outcome Summary  Patient presents with limitations of balance, cognition/safety awareness and endurance/activity tolerance which impede her ability to perform ADL and transfers as prior.  The skills of a therapist will be required to safely and effectively implement treatment plan to restore maximal level of function.  -AV     Row Name 09/10/21 1323          Therapy Assessment/Plan (OT)    Patient/Family Therapy Goal Statement (OT)  None stated  -AV     Rehab Potential (OT)  good, to achieve stated therapy goals  -AV     Criteria for Skilled Therapeutic Interventions Met (OT)  yes;meets criteria;skilled treatment is necessary  -AV     Therapy Frequency (OT)  5 times/wk  -AV     Row Name 09/10/21 1323          Therapy Plan Review/Discharge Plan (OT)    Anticipated Discharge Disposition (OT)  home with assist;home with home health  -AV     Row Name  09/10/21 1323          Vital Signs    O2 Delivery Pre Treatment  room air  -AV     O2 Delivery Intra Treatment  room air  -AV     O2 Delivery Post Treatment  room air  -AV       User Key  (r) = Recorded By, (t) = Taken By, (c) = Cosigned By    Initials Name Provider Type    Bang Bowman OT Occupational Therapist        Outcome Measures     Row Name 09/10/21 1324          How much help from another is currently needed...    Putting on and taking off regular lower body clothing?  3  -AV     Bathing (including washing, rinsing, and drying)  3  -AV     Toileting (which includes using toilet bed pan or urinal)  1  -AV     Putting on and taking off regular upper body clothing  3  -AV     Taking care of personal grooming (such as brushing teeth)  3  -AV     Eating meals  4  -AV     AM-PAC 6 Clicks Score (OT)  17  -AV     Row Name 09/10/21 1324          Functional Assessment    Outcome Measure Options  AM-PAC 6 Clicks Daily Activity (OT);Optimal Instrument  -AV     Row Name 09/10/21 1324          Optimal Instrument    Optimal Instrument  Optimal - 3  -AV     Bending/Stooping  2  -AV     Standing  2  -AV     Reaching  1  -AV     From the list, choose the 3 activities you would most like to be able to do without any difficulty  Bending/stooping;Standing;Reaching  -AV     Total Score Optimal - 3  5  -AV       User Key  (r) = Recorded By, (t) = Taken By, (c) = Cosigned By    Initials Name Provider Type    Bang Bowman OT Occupational Therapist          Occupational Therapy Education                 Title: PT OT SLP Therapies (Done)     Topic: Occupational Therapy (Done)     Point: ADL training (Done)     Description:   Instruct learner(s) on proper safety adaptation and remediation techniques during self care or transfers.   Instruct in proper use of assistive devices.              Learning Progress Summary           Patient Acceptance, E, JOHNATHAN,NL by JARRED at 9/10/2021 1325    Comment: Dementia                   Point:  Home exercise program (Done)     Description:   Instruct learner(s) on appropriate technique for monitoring, assisting and/or progressing therapeutic exercises/activities.              Learning Progress Summary           Patient Acceptance, E, VU,NL by AV at 9/10/2021 1325    Comment: Dementia                   Point: Precautions (Done)     Description:   Instruct learner(s) on prescribed precautions during self-care and functional transfers.              Learning Progress Summary           Patient Acceptance, E, VU,NL by AV at 9/10/2021 1325    Comment: Dementia                   Point: Body mechanics (Done)     Description:   Instruct learner(s) on proper positioning and spine alignment during self-care, functional mobility activities and/or exercises.              Learning Progress Summary           Patient Acceptance, E, VU,NL by AV at 9/10/2021 1325    Comment: Dementia                               User Key     Initials Effective Dates Name Provider Type Discipline     06/16/21 -  Bang Lozano, TASHA Occupational Therapist OT              OT Recommendation and Plan  Planned Therapy Interventions (OT): activity tolerance training, BADL retraining, functional balance retraining, occupation/activity based interventions, patient/caregiver education/training, transfer/mobility retraining  Therapy Frequency (OT): 5 times/wk  Plan of Care Review  Plan of Care Reviewed With: patient  Progress: no change  Outcome Summary: Patient presents with limitations of balance, cognition/safety awareness and endurance/activity tolerance which impede her ability to perform ADL and transfers as prior.  The skills of a therapist will be required to safely and effectively implement treatment plan to restore maximal level of function.     Time Calculation:   Time Calculation- OT     Row Name 09/10/21 1326             Time Calculation- OT    OT Received On  09/10/21  -      OT Goal Re-Cert Due Date  09/19/21  -AV         Timed  Charges    59496 - OT Therapeutic Exercise Minutes  10  -AV         Untimed Charges    OT Eval/Re-eval Minutes  35  -AV         Total Minutes    Timed Charges Total Minutes  10  -AV      Untimed Charges Total Minutes  35  -AV       Total Minutes  45  -AV        User Key  (r) = Recorded By, (t) = Taken By, (c) = Cosigned By    Initials Name Provider Type    AV Bang Lozano OT Occupational Therapist        Therapy Charges for Today     Code Description Service Date Service Provider Modifiers Qty    [id number]  OT THER PROC EA 15 MIN 9/10/2021 Bang Lozano OT GO 1    [id number]  OT EVAL MOD COMPLEXITY 3 9/10/2021 Bang Lozano OT GO 1               Bang Lozano OT  9/10/2021

## 2021-09-10 NOTE — PLAN OF CARE
Goal Outcome Evaluation:  Plan of Care Reviewed With: patient        Progress: no change  Outcome Summary: Patient presents with limitations of balance, cognition/safety awareness and endurance/activity tolerance which impede her ability to perform ADL and transfers as prior.  The skills of a therapist will be required to safely and effectively implement treatment plan to restore maximal level of function.

## 2021-09-10 NOTE — PLAN OF CARE
Goal Outcome Evaluation:  Plan of Care Reviewed With: patient        Progress: no change    PATIENT ALERT TO SELF WITH VERY DISORGANIZED THOUGHTS, FEARFUL WHEN MOVING TO [facility].

## 2021-09-10 NOTE — PROGRESS NOTES
[facility]   Hospitalist Progress Note  Date: 9/10/2021  Patient Name: Marissa Rob  : 1955  MRN: [id number]  Date of admission: 2021    Subjective   Subjective     Chief Complaint:   Confusion    Summary:   Marissa Rob is a 66 y.o. female with history of dementia, remote CVA, hypertension, and hypothyroidism who has visited our emergency department 3 times in the past 2 days.  She was diagnosed with a urinary tract infection yesterday and was discharged home on Bactrim.  She lives at home with her  who typically cares for her.  Since she was discharged home after having a urinary tract infection on oral antibiotics, the patient has had several falls at home and the  is having difficulty caring for her at home given her falls, he brought her back to the emergency department.     At time of my evaluation the patient states that she still has some suprapubic pain.  She states that she feels chilled.  Otherwise no acute complaints.     In the emergency department, patient's vitals were stable.  Laboratory evaluation revealed lactic acidosis of 2.3.  UA still concerning for infection.  Chest x-ray and head CT clear.  Because the above, the hospitalist team was contacted to admit the patient for further management.    Interval Followup:   No acute events overnight, mental status improved    Review of Systems  Denies nausea vomiting or diarrhea  No chest pain no palpitations no shortness of breath  No fever no chills    Objective   Objective     Vitals:   Temp:  [97.5 °F (36.4 °C)-98.4 °F (36.9 °C)] 98 °F (36.7 °C)  Heart Rate:  [70-81] 74  Resp:  [16-18] 18  BP: (140-163)/(56-71) 152/66    Physical Exam   General appearance: NAD, conversant, confused  Eyes: anicteric sclerae, moist conjunctivae; no lid-lag; PERRLA  HENT: Atraumatic; oropharynx clear with moist mucous membranes and no mucosal ulcerations; normal hard and soft palate  Neck: Trachea midline; FROM, supple, no  thyromegaly or lymphadenopathy  Lungs: CTA, with normal respiratory effort and no intercostal retractions  CV: Regular Rate and Rhythm, no Murmurs, Rubs, or Gallops   Abdomen: Soft, non-tender; no masses or Heptosplenomegally  Extremities: No peripheral edema or extremity lymphadenopathy  Skin: Normal temperature, turgor and texture; no rash, ulcers or subcutaneous nodules  Psych: Appropriate affect, alert and oriented to person, place and time  Neuro: CN II - XII intact no motor deficits, no sensory defecits    Result Review    Result Review:  I have personally reviewed the results from the time of this admission to 9/10/2021 12:27 EDT and agree with these findings:  [x]  Laboratory  [x]  Microbiology  [x]  Radiology  [x]  EKG/Telemetry   []  Cardiology/Vascular   []  Pathology  []  Old records  []  Other:    Assessment/Plan   Assessment / Plan     Assessment:  Urinary tract infection  Metabolic encephalopathy due to above  Vascular dementia  Remote CVA  Essential hypertension  Hypothyroidism     Plan:    Admit to hospitalist service  Labs and imaging reviewed  Continue Zosyn, urine cultures pending, no growth to date  Continue IV fluids  Continue with fall precautions  Continue bladder scans, patient voided without difficulty this a.m., continue as needed intermittent catheterization  Resume appropriate home medications  Clinical course will dictate further management  PT/OT  Patient's clinical course will dictate further management  Discussed with ED physician, RN     Telemetry: Reviewed, sinus    Reviewed patients labs and imaging, and discussed with patient and nurse at bedside.    DVT prophylaxis:  Mechanical DVT prophylaxis orders are present.    CODE STATUS:   Code Status: CPR  Medical Interventions (Level of Support Prior to Arrest): Full        Electronically signed by Ok Boyd MD, 09/10/21, 12:27 PM EDT.

## 2021-09-10 NOTE — THERAPY EVALUATION
Acute Care - Physical Therapy Initial Evaluation   Call     Patient Name: Marissa Rob  : 1955  MRN: [id number]  Today's Date: 9/10/2021      Visit Dx:   .    ICD-10-CM ICD-9-CM   1. Urinary tract infection without hematuria, site unspecified  N39.0 599.0   2. Confusion  R41.0 298.9   3. Generalized weakness  R53.1 780.79     Patient Active Problem List   Diagnosis   • Arthritis   • Dementia (CMS/HCC)   • Hepatitis A   • Cerebrovascular disease   • Bladder problem   • Hypertension   • Vascular disease, peripheral (CMS/HCC)   • Memory loss   • Microhematuria   • Obsessive-compulsive disorder   • Postmenopausal syndrome   • Hypothyroidism   • Vascular dementia (CMS/HCC)   • Thyroid disease   • Vitamin D deficiency   • UTI (urinary tract infection)     Past Medical History:   Diagnosis Date   • Arthritis    • Bladder problem    • Cerebrovascular disease 2015   • Dementia (CMS/HCC) 2018    3yrs memory change, neuropsych test .on aricept switched to memantine. MRI of brain revealed few MILD scattered FLAIR hyperintensities. Reluctant to attribute to memory issues. noted to have atrophy of the bitemporal region a component of Alzheimer's dementia. Refer for repeat neuropsych testing. titrate her memory/ 10 mg twice per day   • Hepatitis A    • Hypertension    • Hypomagnesemia 2016   • Hypothyroidism 2015   • Leg pain    • Memory loss 2015   • Microhematuria    • Muscle cramps    • Obsessive compulsive disorder    • Pap smear for cervical cancer screening     no longer   • Postmenopausal syndrome 2017   • Thyroid disease    • Vascular dementia (CMS/HCC) 08/10/2015   • Vascular disease, peripheral (CMS/HCC)    • Vitamin D deficiency      Past Surgical History:   Procedure Laterality Date   • BACK SURGERY     • BLADDER REPAIR     • COLONOSCOPY  2020    POLYP   • CYSTOSCOPY  2020   • HYSTERECTOMY      partial   • LAMINECTOMY      L5-S1   • TUBAL  ABDOMINAL LIGATION  1979        PT Assessment (last 12 hours)      PT Evaluation and Treatment     [facility] Name 09/10/21 1424          Physical Therapy Time and Intention    Subjective Information  no complaints  (Pended)   -RV     Document Type  evaluation  (Pended)   -RV     Mode of Treatment  individual therapy;physical therapy  (Pended)   -RV     Patient Effort  excellent  (Pended)   -[facility] Name 09/10/21 1424          General Information    Patient Profile Reviewed  yes  (Pended)   -RV     Patient Observations  alert;cooperative;agree to therapy  (Pended)   -RV     Prior Level of Function  independent:;all household mobility;gait  (Pended)   -RV     Equipment Currently Used at Home  walker, rolling  (Pended)   -RV     Existing Precautions/Restrictions  no known precautions/restrictions  (Pended)   -RV     Benefits Reviewed  patient:  (Pended)   -RV     Barriers to Rehab  none identified  (Pended)   -[facility] Name 09/10/21 1424          Living Environment    Current Living Arrangements  home/apartment/condo  (Pended)   -RV     Lives With  spouse  (Pended)   -RV     Row Name 09/10/21 1424          Home Use of Assistive/Adaptive Equipment    Equipment Currently Used at Home  walker, rolling  (Pended)   -[facility] Name 09/10/21 1424          Range of Motion (ROM)    Range of Motion  bilateral lower extremities;ROM is WFL  (Pended)   -RV     Row Name 09/10/21 1424          Strength (Manual Muscle Testing)    Strength (Manual Muscle Testing)  bilateral lower extremities;strength is WFL  (Pended)   -RV     Row Name 09/10/21 1424          Bed Mobility    Bed Mobility  supine-sit;sit-supine  (Pended)   -RV     Supine-Sit Barnes (Bed Mobility)  contact guard  (Pended)   -RV     Sit-Supine Barnes (Bed Mobility)  contact guard  (Pended)   -[facility] Name 09/10/21 1424          Transfers    Transfers  sit-stand transfer;stand-sit transfer  (Pended)   -RV     Sit-Stand Barnes (Transfers)  contact guard   (Pended)   -RV     Stand-Sit Cedar (Transfers)  contact guard  (Pended)   -RV     Row Name 09/10/21 1424          Sit-Stand Transfer    Assistive Device (Sit-Stand Transfers)  walker, front-wheeled  (Pended)   -RV     Row Name 09/10/21 1424          Stand-Sit Transfer    Assistive Device (Stand-Sit Transfers)  walker, front-wheeled  (Pended)   -RV     Row Name 09/10/21 1424          Gait/Stairs (Locomotion)    Gait/Stairs Locomotion  gait/ambulation assistive device  (Pended)   -     [place] (Gait)  contact guard  (Pended)   -     Assistive Device (Gait)  walker, front-wheeled  (Pended)   -     Distance in Feet (Gait)  150  (Pended)   -RV     Row Name 09/10/21 1424          Safety Issues, Functional Mobility    Impairments Affecting Function (Mobility)  cognition;endurance/activity tolerance;balance  (Pended)   -     Cognitive Impairments, Mobility Safety/Performance  awareness, need for assistance;judgment  (Pended)  Pt has dementia  -     Row Name 09/10/21 1424          Balance    Balance Assessment  standing dynamic balance  (Pended)   -     Dynamic Sitting Balance  WFL  (Pended)   -     Dynamic Standing Balance  mild impairment  (Pended)   -RV     Row Name             Wound 09/08/21 1930 Left lateral head Traumatic    Wound - Properties Group Placement Date: 09/08/21  -LC Placement Time: 1930  -LC Present on Hospital Admission: Y  -LC Side: Left  -LC Orientation: lateral  -LC Location: head  -LC Primary Wound Type: Traumatic  -LC Stage, Pressure Injury : other (see comments)  -LC    Retired Wound - Properties Group Date first assessed: 09/08/21  -LC Time first assessed: 1930  -LC Present on Hospital Admission: Y  -LC Side: Left  -LC Location: head  -LC Primary Wound Type: Traumatic  -LC    Row Name             Wound 09/08/21 1930 Left distal arm    Wound - Properties Group Placement Date: 09/08/21  -LC Placement Time: 1930  -LC Side: Left  -LC Orientation: distal  -LC Location:  arm  -LC Stage, Pressure Injury : other (see comments)  -LC, skin tear     Retired Wound - Properties Group Date first assessed: 09/08/21  -LC Time first assessed: 1930  -LC Side: Left  -LC Location: arm  -LC    Row Name 09/10/21 1424          Plan of Care Review    Plan of Care Reviewed With  patient  (Pended)   -RV     Progress  no change  (Pended)   -RV     Outcome Summary  Pt demonstrated limitations in cognition as well as balance. Pt will benefit from skilled therapy services to improve balance and mobility.  (Pended)   -RV     Row Name 09/10/21 1424          Therapy Assessment/Plan (PT)    Rehab Potential (PT)  good, to achieve stated therapy goals  (Pended)   -RV     Criteria for Skilled Interventions Met (PT)  yes;skilled treatment is necessary  (Pended)   -RV     Predicted Duration of Therapy Intervention (PT)  10 days  (Pended)   -RV     Problem List (PT)  balance;cognition;motor control  (Pended)   -RV     Row Name 09/10/21 1424          PT Evaluation Complexity    History, PT Evaluation Complexity  no personal factors and/or comorbidities  (Pended)   -RV     Examination of Body Systems (PT Eval Complexity)  total of 4 or more elements  (Pended)   -RV     Clinical Presentation (PT Evaluation Complexity)  stable  (Pended)   -RV     Clinical Decision Making (PT Evaluation Complexity)  low complexity  (Pended)   -RV     Overall Complexity (PT Evaluation Complexity)  low complexity  (Pended)   -RV     Row Name 09/10/21 1424          Therapy Plan Review/Discharge Plan (PT)    Therapy Plan Review (PT)  patient;friend  (Pended)   -RV       User Key  (r) = Recorded By, (t) = Taken By, (c) = Cosigned By    Initials Name Provider Type    Andrés Avalos, RN Registered Nurse    Anh West, PT Student PT Student        Physical Therapy Education                 Title: PT OT SLP Therapies (Done)     Topic: Physical Therapy (Done)     Point: Mobility training (Done)     Learning Progress Summary            Patient Acceptance, E,TB, VU by  at 9/10/2021 1437                   Point: Home exercise program (Done)     Learning Progress Summary           Patient Acceptance, E,TB, VU by  at 9/10/2021 1437                   Point: Body mechanics (Done)     Learning Progress Summary           Patient Acceptance, E,TB, VU by  at 9/10/2021 1437                   Point: Precautions (Done)     Learning Progress Summary           Patient Acceptance, E,TB, VU by  at 9/10/2021 1437                               User Key     Initials Effective Dates Name Provider Type Discipline     08/19/21 -  Anh Hicks, PT Student PT Student PT              PT Recommendation and Plan  Anticipated Discharge Disposition (PT): (P) home with assist (assist from spouse)  Planned Therapy Interventions (PT): (P) balance training, gait training  Therapy Frequency (PT): (P) daily  Plan of Care Reviewed With: (P) patient  Progress: (P) no change  Outcome Summary: (P) Pt demonstrated limitations in cognition as well as balance. Pt will benefit from skilled therapy services to improve balance and mobility.  Outcome Measures     Row Name 09/10/21 1400             How much help from another person do you currently need...    Turning from your back to your side while in flat bed without using bedrails?  4  (Pended)   -RV      Moving from lying on back to sitting on the side of a flat bed without bedrails?  3  (Pended)   -RV      Moving to and from a bed to a chair (including a wheelchair)?  3  (Pended)   -RV      Standing up from a chair using your arms (e.g., wheelchair, bedside chair)?  3  (Pended)   -RV      Climbing 3-5 steps with a railing?  3  (Pended)   -RV      To walk in hospital room?  3  (Pended)   -RV      AM-PAC 6 Clicks Score (PT)  19  (Pended)   -RV        User Key  (r) = Recorded By, (t) = Taken By, (c) = Cosigned By    Initials Name Provider Type    RV Anh Hicks, PT Student PT Student           Time Calculation:    PT Charges     Row Name 09/10/21 1438             Time Calculation    PT Received On  09/10/21  (Pended)   -RV      PT Goal Re-Cert Due Date  09/19/21  (Pended)   -RV         Untimed Charges    PT Eval/Re-eval Minutes  45  (Pended)   -RV         Total Minutes    Untimed Charges Total Minutes  45  (Pended)   -RV       Total Minutes  45  (Pended)   -RV        User Key  (r) = Recorded By, (t) = Taken By, (c) = Cosigned By    Initials Name Provider Type    RV Anh Hicks, PT Student PT Student        Therapy Charges for Today     Code Description Service Date Service Provider Modifiers Qty    93997645709 HC PT EVAL LOW COMPLEXITY 3 9/10/2021 Anh Hicks, PT Student GP 1          PT G-Codes  Outcome Measure Options: AM-PAC 6 Clicks Daily Activity (OT), Optimal Instrument  AM-PAC 6 Clicks Score (PT): (P) 19  AM-PAC 6 Clicks Score (OT): 17    Anh Hicks, PT Student  9/10/2021

## 2021-09-11 PROCEDURE — 99233 SBSQ HOSP IP/OBS HIGH 50: CPT | Performed by: INTERNAL MEDICINE

## 2021-09-11 PROCEDURE — 97116 GAIT TRAINING THERAPY: CPT

## 2021-09-11 PROCEDURE — 25010000002 PIPERACILLIN SOD-TAZOBACTAM PER 1 G: Performed by: PHYSICIAN ASSISTANT

## 2021-09-11 RX ADMIN — TAZOBACTAM SODIUM AND PIPERACILLIN SODIUM 3.38 G: 375; 3 INJECTION, SOLUTION INTRAVENOUS at 00:12

## 2021-09-11 RX ADMIN — DONEPEZIL HYDROCHLORIDE 20 MG: 10 TABLET, FILM COATED ORAL at 20:30

## 2021-09-11 RX ADMIN — LEVOTHYROXINE SODIUM 50 MCG: 50 TABLET ORAL at 08:36

## 2021-09-11 RX ADMIN — SODIUM CHLORIDE, PRESERVATIVE FREE 10 ML: 5 INJECTION INTRAVENOUS at 08:36

## 2021-09-11 RX ADMIN — TAZOBACTAM SODIUM AND PIPERACILLIN SODIUM 3.38 G: 375; 3 INJECTION, SOLUTION INTRAVENOUS at 16:30

## 2021-09-11 RX ADMIN — MEMANTINE 10 MG: 10 TABLET ORAL at 08:36

## 2021-09-11 RX ADMIN — TAZOBACTAM SODIUM AND PIPERACILLIN SODIUM 3.38 G: 375; 3 INJECTION, SOLUTION INTRAVENOUS at 08:36

## 2021-09-11 RX ADMIN — MEMANTINE 10 MG: 10 TABLET ORAL at 20:30

## 2021-09-11 NOTE — PROGRESS NOTES
[facility]   Hospitalist Progress Note  Date: 2021  Patient Name: Marissa Rob  : 1955  MRN: [id number]  Date of admission: 2021    Subjective   Subjective     Chief Complaint:   Confusion    Summary:   Marissa Rob is a 66 y.o. female with history of dementia, remote CVA, hypertension, and hypothyroidism who has visited our emergency department 3 times in the past 2 days.  She was diagnosed with a urinary tract infection yesterday and was discharged home on Bactrim.  She lives at home with her  who typically cares for her.  Since she was discharged home after having a urinary tract infection on oral antibiotics, the patient has had several falls at home and the  is having difficulty caring for her at home given her falls, he brought her back to the emergency department.     At time of my evaluation the patient states that she still has some suprapubic pain.  She states that she feels chilled.  Otherwise no acute complaints.     In the emergency department, patient's vitals were stable.  Laboratory evaluation revealed lactic acidosis of 2.3.  UA still concerning for infection.  Chest x-ray and head CT clear.  Because the above, the hospitalist team was contacted to admit the patient for further management.    Interval Followup:   No acute events overnight, mental status improved,  at bedside, happy with her progress    Review of Systems  Denies nausea vomiting or diarrhea  No chest pain no palpitations no shortness of breath  No fever no chills    Objective   Objective     Vitals:   Temp:  [97.4 °F (36.3 °C)-98.2 °F (36.8 °C)] 97.9 °F (36.6 °C)  Heart Rate:  [65-79] 65  Resp:  [16-18] 16  BP: (128-154)/(50-73) 128/60    Physical Exam   General appearance: NAD, conversant, confused  Eyes: anicteric sclerae, moist conjunctivae; no lid-lag; PERRLA  HENT: Atraumatic; oropharynx clear with moist mucous membranes and no mucosal ulcerations; normal hard and soft  palate  Neck: Trachea midline; FROM, supple, no thyromegaly or lymphadenopathy  Lungs: CTA, with normal respiratory effort and no intercostal retractions  CV: Regular Rate and Rhythm, no Murmurs, Rubs, or Gallops   Abdomen: Soft, non-tender; no masses or Heptosplenomegally  Extremities: No peripheral edema or extremity lymphadenopathy  Skin: Normal temperature, turgor and texture; no rash, ulcers or subcutaneous nodules  Psych: Appropriate affect, alert and oriented to person, place and time  Neuro: CN II - XII intact no motor deficits, no sensory defecits    Result Review    Result Review:  I have personally reviewed the results from the time of this admission to 9/11/2021 11:52 EDT and agree with these findings:  [x]  Laboratory  [x]  Microbiology  [x]  Radiology  [x]  EKG/Telemetry   []  Cardiology/Vascular   []  Pathology  []  Old records  []  Other:    Assessment/Plan   Assessment / Plan     Assessment:  Urinary tract infection  Metabolic encephalopathy due to above  Vascular dementia  Remote CVA  Essential hypertension  Hypothyroidism     Plan:    Admit to hospitalist service  Labs and imaging reviewed  Continue Zosyn, urine culture with no dominant organism,  Will complete 1 additional days of IV antibiotics, and then plan for discharge home tomorrow given the absence of dominant organism and culture and failure of outpatient oral antibiotics  Continue IV fluids  Continue with fall precautions  Change bladder scans to every shift  Resume appropriate home medications  Clinical course will dictate further management  PT/OT  Patient's clinical course will dictate further management  Discussed with ED physician, RN     Telemetry: Reviewed by me, sinus rhythm    Reviewed patients labs and imaging, and discussed with patient and nurse at bedside.    DVT prophylaxis:  Mechanical DVT prophylaxis orders are present.    CODE STATUS:   Code Status: CPR  Medical Interventions (Level of Support Prior to Arrest):  Full        Electronically signed by Ok Boyd MD, 09/11/21, 11:52 AM EDT.

## 2021-09-11 NOTE — THERAPY TREATMENT NOTE
Acute Care - Physical Therapy Treatment Note  [facility]     Patient Name: Marissa Rob  : 1955  MRN: [id number]  Today's Date: 2021      Visit Dx:     ICD-10-CM ICD-9-CM   1. Urinary tract infection without hematuria, site unspecified  N39.0 599.0   2. Confusion  R41.0 298.9   3. Generalized weakness  R53.1 780.79     Patient Active Problem List   Diagnosis   • Arthritis   • Dementia (CMS/HCC)   • Hepatitis A   • Cerebrovascular disease   • Bladder problem   • Hypertension   • Vascular disease, peripheral (CMS/HCC)   • Memory loss   • Microhematuria   • Obsessive-compulsive disorder   • Postmenopausal syndrome   • Hypothyroidism   • Vascular dementia (CMS/HCC)   • Thyroid disease   • Vitamin D deficiency   • UTI (urinary tract infection)     Past Medical History:   Diagnosis Date   • Arthritis    • Bladder problem    • Cerebrovascular disease 2015   • Dementia (CMS/HCC) 2018    3yrs memory change, neuropsych test .on aricept switched to memantine. MRI of brain revealed few MILD scattered FLAIR hyperintensities. Reluctant to attribute to memory issues. noted to have atrophy of the bitemporal region a component of Alzheimer's dementia. Refer for repeat neuropsych testing. titrate her memory/ 10 mg twice per day   • Hepatitis A    • Hypertension    • Hypomagnesemia 2016   • Hypothyroidism 2015   • Leg pain    • Memory loss 2015   • Microhematuria    • Muscle cramps    • Obsessive compulsive disorder    • Pap smear for cervical cancer screening     no longer   • Postmenopausal syndrome 2017   • Thyroid disease    • Vascular dementia (CMS/HCC) 08/10/2015   • Vascular disease, peripheral (CMS/HCC)    • Vitamin D deficiency      Past Surgical History:   Procedure Laterality Date   • BACK SURGERY     • BLADDER REPAIR     • COLONOSCOPY  2020    POLYP   • CYSTOSCOPY  2020   • HYSTERECTOMY      partial   • LAMINECTOMY      L5-S1   • TUBAL ABDOMINAL  IVAN  1979        PT Assessment (last 12 hours)      PT Evaluation and Treatment     Row Name 09/11/21 1001          Physical Therapy Time and Intention    Subjective Information  no complaints  (Pended)   -RV     Document Type  therapy note (daily note)  (Pended)   -RV     Mode of Treatment  individual therapy;physical therapy  (Pended)   -RV     Patient Effort  excellent  (Pended)   -RV     Row Name 09/11/21 1001          Bed Mobility    Supine-Sit Reynolds (Bed Mobility)  supervision  (Pended)   -RV     Sit-Supine Reynolds (Bed Mobility)  supervision  (Pended)   -RV     Row Name 09/11/21 1001          Transfers    Transfers  sit-stand transfer;stand-sit transfer  (Pended)   -RV     Sit-Stand Reynolds (Transfers)  supervision  (Pended)   -RV     Stand-Sit Reynolds (Transfers)  supervision  (Pended)   -RV     Row Name 09/11/21 1001          Gait/Stairs (Locomotion)    Gait/Stairs Locomotion  gait/ambulation independence  (Pended)   -RV     Reynolds Level (Gait)  standby assist  (Pended)   -RV     Distance in Feet (Gait)  200  (Pended)   -RV     Row Name             Wound 09/08/21 1930 Left lateral head Traumatic    Wound - Properties Group Placement Date: 09/08/21  -LC Placement Time: 1930  -LC Present on Hospital Admission: Y  -LC Side: Left  -LC Orientation: lateral  -LC Location: head  -LC Primary Wound Type: Traumatic  -LC Stage, Pressure Injury : other (see comments)  -LC    Retired Wound - Properties Group Date first assessed: 09/08/21  -LC Time first assessed: 1930  -LC Present on Hospital Admission: Y  -LC Side: Left  -LC Location: head  -LC Primary Wound Type: Traumatic  -LC    Row Name             Wound 09/08/21 1930 Left distal arm    Wound - Properties Group Placement Date: 09/08/21  -LC Placement Time: 1930  -LC Side: Left  -LC Orientation: distal  -LC Location: arm  -LC Stage, Pressure Injury : other (see comments)  -LC, skin tear     Retired Wound - Properties Group Date first  assessed: 09/08/21  - Time first assessed: 1930  - Side: Left  - Location: arm  -    Row Name 09/11/21 1001          Progress Summary (PT)    Progress Toward Functional Goals (PT)  progress toward functional goals is good;prepare for discharge  (Pended)   -     Daily Progress Summary (PT)  The patient is able to safely ambulate with SBA for 200 feet and she is able to perform transfers with Supervision.  The patient will be discharged at this time.  (Pended)   -       User Key  (r) = Recorded By, (t) = Taken By, (c) = Cosigned By    Initials Name Provider Type     Andrés Uriarte, RN Registered Nurse    Anh West, PT Student PT Student        Physical Therapy Education                 Title: PT OT SLP Therapies (Done)     Topic: Physical Therapy (Done)     Point: Mobility training (Done)     Learning Progress Summary           Patient Acceptance, E,TB, VU by  at 9/10/2021 1437                   Point: Home exercise program (Done)     Learning Progress Summary           Patient Acceptance, E,TB, VU by  at 9/10/2021 1437                   Point: Body mechanics (Done)     Learning Progress Summary           Patient Acceptance, E,TB, VU by  at 9/10/2021 1437                   Point: Precautions (Done)     Learning Progress Summary           Patient Acceptance, E,TB, VU by  at 9/10/2021 1437                               User Key     Initials Effective Dates Name Provider Type Discipline     08/19/21 -  Anh Hicks, PT Student PT Student PT              PT Recommendation and Plan  Anticipated Discharge Disposition (PT): (P) home with assist (assist from spouse)  Planned Therapy Interventions (PT): (P) balance training, gait training  Therapy Frequency (PT): (P) daily  Progress Summary (PT)  Progress Toward Functional Goals (PT): (P) progress toward functional goals is good, prepare for discharge  Daily Progress Summary (PT): (P) The patient is able to safely ambulate with  SBA for 200 feet and she is able to perform transfers with Supervision.  The patient will be discharged at this time.  Plan of Care Reviewed With: (P) patient  Progress: (P) no change  Outcome Summary: (P) Pt demonstrated limitations in cognition as well as balance. Pt will benefit from skilled therapy services to improve balance and mobility.  Outcome Measures     Row Name 09/11/21 1011 09/10/21 1400          How much help from another person do you currently need...    Turning from your back to your side while in flat bed without using bedrails?  4  (Pended)   -RV  4  (Pended)   -RV     Moving from lying on back to sitting on the side of a flat bed without bedrails?  4  (Pended)   -RV  3  (Pended)   -RV     Moving to and from a bed to a chair (including a wheelchair)?  3  (Pended)   -RV  3  (Pended)   -RV     Standing up from a chair using your arms (e.g., wheelchair, bedside chair)?  4  (Pended)   -RV  3  (Pended)   -RV     Climbing 3-5 steps with a railing?  3  (Pended)   -RV  3  (Pended)   -RV     To walk in hospital room?  3  (Pended)   -RV  3  (Pended)   -RV     AM-PAC 6 Clicks Score (PT)  21  (Pended)   -RV  19  -AT (r) RV (t)        Functional Assessment    Outcome Measure Options  AM-PAC 6 Clicks Basic Mobility (PT)  (Pended)   -RV  --       User Key  (r) = Recorded By, (t) = Taken By, (c) = Cosigned By    Initials Name Provider Type    AT Kalie Logan RN Registered Nurse    Anh West, PT Student PT Student           Time Calculation:   PT Charges     Row Name 09/11/21 0959             Time Calculation    PT Received On  09/11/21  (Pended)   -RV      PT Goal Re-Cert Due Date  09/19/21  (Pended)   -RV         Timed Charges    06553 - Gait Training Minutes   15  (Pended)   -RV         Total Minutes    Timed Charges Total Minutes  15  (Pended)   -RV       Total Minutes  15  (Pended)   -RV        User Key  (r) = Recorded By, (t) = Taken By, (c) = Cosigned By    Initials Name Provider Type     RV Anh Hicks, PT Student PT Student        Therapy Charges for Today     Code Description Service Date Service Provider Modifiers Qty    13557591739 HC PT EVAL LOW COMPLEXITY 3 9/10/2021 Anh Hicks, PT Student GP 1    15274673355 HC GAIT TRAINING EA 15 MIN 9/11/2021 Anh Hicks, PT Student GP 1          PT G-Codes  Outcome Measure Options: (P) AM-PAC 6 Clicks Basic Mobility (PT)  AM-PAC 6 Clicks Score (PT): (P) 21  AM-PAC 6 Clicks Score (OT): 17    Anh Hicks, PT Student  9/11/2021

## 2021-09-11 NOTE — THERAPY TREATMENT NOTE
Acute Care - Physical Therapy Treatment Note  [facility]     Patient Name: Marissa Rob  : 1955  MRN: [id number]  Today's Date: 2021      Visit Dx:     ICD-10-CM ICD-9-CM   1. Urinary tract infection without hematuria, site unspecified  N39.0 599.0   2. Confusion  R41.0 298.9   3. Generalized weakness  R53.1 780.79     Patient Active Problem List   Diagnosis   • Arthritis   • Dementia (CMS/HCC)   • Hepatitis A   • Cerebrovascular disease   • Bladder problem   • Hypertension   • Vascular disease, peripheral (CMS/HCC)   • Memory loss   • Microhematuria   • Obsessive-compulsive disorder   • Postmenopausal syndrome   • Hypothyroidism   • Vascular dementia (CMS/HCC)   • Thyroid disease   • Vitamin D deficiency   • UTI (urinary tract infection)     Past Medical History:   Diagnosis Date   • Arthritis    • Bladder problem    • Cerebrovascular disease 2015   • Dementia (CMS/HCC) 2018    3yrs memory change, neuropsych test .on aricept switched to memantine. MRI of brain revealed few MILD scattered FLAIR hyperintensities. Reluctant to attribute to memory issues. noted to have atrophy of the bitemporal region a component of Alzheimer's dementia. Refer for repeat neuropsych testing. titrate her memory/ 10 mg twice per day   • Hepatitis A    • Hypertension    • Hypomagnesemia 2016   • Hypothyroidism 2015   • Leg pain    • Memory loss 2015   • Microhematuria    • Muscle cramps    • Obsessive compulsive disorder    • Pap smear for cervical cancer screening     no longer   • Postmenopausal syndrome 2017   • Thyroid disease    • Vascular dementia (CMS/HCC) 08/10/2015   • Vascular disease, peripheral (CMS/HCC)    • Vitamin D deficiency      Past Surgical History:   Procedure Laterality Date   • BACK SURGERY     • BLADDER REPAIR     • COLONOSCOPY  2020    POLYP   • CYSTOSCOPY  2020   • HYSTERECTOMY      partial   • LAMINECTOMY      L5-S1   • TUBAL ABDOMINAL  IVAN  1979        PT Assessment (last 12 hours)      PT Evaluation and Treatment     Row Name 09/11/21 1001          Physical Therapy Time and Intention    Subjective Information  no complaints  (Pended)   -RV     Document Type  therapy note (daily note)  (Pended)   -RV     Mode of Treatment  individual therapy;physical therapy  (Pended)   -RV     Patient Effort  excellent  (Pended)   -RV     Row Name 09/11/21 1001          Bed Mobility    Supine-Sit Wynne (Bed Mobility)  supervision  (Pended)   -RV     Sit-Supine Wynne (Bed Mobility)  supervision  (Pended)   -RV     Row Name 09/11/21 1001          Transfers    Transfers  sit-stand transfer;stand-sit transfer  (Pended)   -RV     Sit-Stand Wynne (Transfers)  supervision  (Pended)   -RV     Stand-Sit Wynne (Transfers)  supervision  (Pended)   -RV     Row Name 09/11/21 1001          Gait/Stairs (Locomotion)    Gait/Stairs Locomotion  gait/ambulation independence  (Pended)   -RV     Wynne Level (Gait)  standby assist  (Pended)   -RV     Distance in Feet (Gait)  200  (Pended)   -RV     Row Name             Wound 09/08/21 1930 Left lateral head Traumatic    Wound - Properties Group Placement Date: 09/08/21  -LC Placement Time: 1930  -LC Present on Hospital Admission: Y  -LC Side: Left  -LC Orientation: lateral  -LC Location: head  -LC Primary Wound Type: Traumatic  -LC Stage, Pressure Injury : other (see comments)  -LC    Retired Wound - Properties Group Date first assessed: 09/08/21  -LC Time first assessed: 1930  -LC Present on Hospital Admission: Y  -LC Side: Left  -LC Location: head  -LC Primary Wound Type: Traumatic  -LC    Row Name             Wound 09/08/21 1930 Left distal arm    Wound - Properties Group Placement Date: 09/08/21  -LC Placement Time: 1930  -LC Side: Left  -LC Orientation: distal  -LC Location: arm  -LC Stage, Pressure Injury : other (see comments)  -LC, skin tear     Retired Wound - Properties Group Date first  assessed: 09/08/21  - Time first assessed: 1930  - Side: Left  - Location: arm  -    Row Name 09/11/21 1001          Progress Summary (PT)    Progress Toward Functional Goals (PT)  progress toward functional goals is good  (Pended)   -     Daily Progress Summary (PT)  The patient is able to safely ambulate with SBA for 200 feet and she is able to perform transfers with Supervision.  The patient will be discharged at this time.  (Pended)   -       User Key  (r) = Recorded By, (t) = Taken By, (c) = Cosigned By    Initials Name Provider Type    LC Andrés Uriarte, RN Registered Nurse    Anh West, PT Student PT Student        Physical Therapy Education                 Title: PT OT SLP Therapies (Done)     Topic: Physical Therapy (Done)     Point: Mobility training (Done)     Learning Progress Summary           Patient Acceptance, E,TB, VU by  at 9/10/2021 1437                   Point: Home exercise program (Done)     Learning Progress Summary           Patient Acceptance, E,TB, VU by  at 9/10/2021 1437                   Point: Body mechanics (Done)     Learning Progress Summary           Patient Acceptance, E,TB, VU by  at 9/10/2021 1437                   Point: Precautions (Done)     Learning Progress Summary           Patient Acceptance, E,TB, VU by  at 9/10/2021 1437                               User Key     Initials Effective Dates Name Provider Type Discipline     08/19/21 -  Anh Hicks, PT Student PT Student PT              PT Recommendation and Plan  Anticipated Discharge Disposition (PT): (P) home with assist (assist from spouse)  Planned Therapy Interventions (PT): (P) balance training, gait training  Therapy Frequency (PT): (P) daily  Progress Summary (PT)  Progress Toward Functional Goals (PT): (P) progress toward functional goals is good  Daily Progress Summary (PT): (P) The patient is able to safely ambulate with SBA for 200 feet and she is able to perform  transfers with Supervision.  The patient will be discharged at this time.  Plan of Care Reviewed With: (P) patient  Progress: (P) no change  Outcome Summary: (P) Pt demonstrated limitations in cognition as well as balance. Pt will benefit from skilled therapy services to improve balance and mobility.  Outcome Measures     Row Name 09/11/21 1011 09/10/21 1400          How much help from another person do you currently need...    Turning from your back to your side while in flat bed without using bedrails?  4  (Pended)   -RV  4  (Pended)   -RV     Moving from lying on back to sitting on the side of a flat bed without bedrails?  4  (Pended)   -RV  3  (Pended)   -RV     Moving to and from a bed to a chair (including a wheelchair)?  3  (Pended)   -RV  3  (Pended)   -RV     Standing up from a chair using your arms (e.g., wheelchair, bedside chair)?  4  (Pended)   -RV  3  (Pended)   -RV     Climbing 3-5 steps with a railing?  3  (Pended)   -RV  3  (Pended)   -RV     To walk in hospital room?  3  (Pended)   -RV  3  (Pended)   -RV     AM-PAC 6 Clicks Score (PT)  21  (Pended)   -RV  19  -AT (r) RV (t)        Functional Assessment    Outcome Measure Options  AM-PAC 6 Clicks Basic Mobility (PT)  (Pended)   -RV  --       User Key  (r) = Recorded By, (t) = Taken By, (c) = Cosigned By    Initials Name Provider Type    AT Kalie Logan RN Registered Nurse    Anh West, PT Student PT Student           Time Calculation:   PT Charges     Row Name 09/11/21 0959             Time Calculation    PT Received On  09/11/21  (Pended)   -RV      PT Goal Re-Cert Due Date  09/19/21  (Pended)   -RV         Timed Charges    97536 - Gait Training Minutes   15  (Pended)   -RV         Total Minutes    Timed Charges Total Minutes  15  (Pended)   -RV       Total Minutes  15  (Pended)   -RV        User Key  (r) = Recorded By, (t) = Taken By, (c) = Cosigned By    Initials Name Provider Type    Anh West, PT Student PT  Student        Therapy Charges for Today     Code Description Service Date Service Provider Modifiers Qty    70005151857 HC PT EVAL LOW COMPLEXITY 3 9/10/2021 Anh Hicks, PT Student GP 1    13485569062 HC GAIT TRAINING EA 15 MIN 9/11/2021 Anh Hicks, PT Student GP 1          PT G-Codes  Outcome Measure Options: (P) AM-PAC 6 Clicks Basic Mobility (PT)  AM-PAC 6 Clicks Score (PT): (P) 21  AM-PAC 6 Clicks Score (OT): 17    Anh Hicks, HAKEEM Student  9/11/2021

## 2021-09-12 ENCOUNTER — READMISSION MANAGEMENT (OUTPATIENT)
Dept: CALL CENTER | Facility: HOSPITAL | Age: 66
End: 2021-09-12

## 2021-09-12 VITALS
DIASTOLIC BLOOD PRESSURE: 49 MMHG | HEART RATE: 59 BPM | RESPIRATION RATE: 18 BRPM | HEIGHT: 68 IN | OXYGEN SATURATION: 100 % | TEMPERATURE: 98.4 F | BODY MASS INDEX: 26.88 KG/M2 | SYSTOLIC BLOOD PRESSURE: 131 MMHG

## 2021-09-12 LAB
BACTERIA SPEC AEROBE CULT: NORMAL
BACTERIA SPEC AEROBE CULT: NORMAL

## 2021-09-12 PROCEDURE — 25010000002 PIPERACILLIN SOD-TAZOBACTAM PER 1 G: Performed by: PHYSICIAN ASSISTANT

## 2021-09-12 PROCEDURE — 99239 HOSP IP/OBS DSCHRG MGMT >30: CPT | Performed by: INTERNAL MEDICINE

## 2021-09-12 RX ADMIN — LEVOTHYROXINE SODIUM 50 MCG: 50 TABLET ORAL at 08:22

## 2021-09-12 RX ADMIN — MEMANTINE 10 MG: 10 TABLET ORAL at 08:22

## 2021-09-12 RX ADMIN — SODIUM CHLORIDE, POTASSIUM CHLORIDE, SODIUM LACTATE AND CALCIUM CHLORIDE 100 ML/HR: 600; 310; 30; 20 INJECTION, SOLUTION INTRAVENOUS at 01:11

## 2021-09-12 RX ADMIN — SODIUM CHLORIDE, POTASSIUM CHLORIDE, SODIUM LACTATE AND CALCIUM CHLORIDE 100 ML/HR: 600; 310; 30; 20 INJECTION, SOLUTION INTRAVENOUS at 08:22

## 2021-09-12 RX ADMIN — SODIUM CHLORIDE, PRESERVATIVE FREE 10 ML: 5 INJECTION INTRAVENOUS at 08:22

## 2021-09-12 RX ADMIN — TAZOBACTAM SODIUM AND PIPERACILLIN SODIUM 3.38 G: 375; 3 INJECTION, SOLUTION INTRAVENOUS at 01:11

## 2021-09-12 RX ADMIN — TAZOBACTAM SODIUM AND PIPERACILLIN SODIUM 3.38 G: 375; 3 INJECTION, SOLUTION INTRAVENOUS at 08:22

## 2021-09-12 NOTE — DISCHARGE SUMMARY
[facility]         HOSPITALIST  DISCHARGE SUMMARY    Patient Name: Mraissa Rob  : 1955  MRN: [id number]    Date of Admission: 2021  Date of Discharge:  2021  Primary Care Physician: Rabia Logan APRN    Consults     Date and Time Order Name Status Description    2021  4:28 AM Inpatient Hospitalist Consult Completed           Active and Resolved Hospital Problems:  Urinary tract infection from unspecified organism, failed outpatient treatment  Metabolic encephalopathy due to above  Vascular dementia  Remote CVA  Essential hypertension  Hypothyroidism    Hospital Course     Hospital Course:  Marissa Rob is a 66 y.o. female with history of dementia, remote CVA, hypertension, and hypothyroidism who has visited our emergency department 3 times in the past 2 days.  She was diagnosed with a urinary tract infection yesterday and was discharged home on Bactrim.  She lives at home with her  who typically cares for her.  Since she was discharged home after having a urinary tract infection on oral antibiotics, the patient has had several falls at home and the  is having difficulty caring for her at home given her falls, he brought her back to the emergency department.     At time of my evaluation the patient states that she still has some suprapubic pain.  She states that she feels chilled.  Otherwise no acute complaints.     In the emergency department, patient's vitals were stable.  Laboratory evaluation revealed lactic acidosis of 2.3.  UA still concerning for infection.  Chest x-ray and head CT clear.  Because the above, the hospitalist team was contacted to admit the patient for further management.    Urine cultures were obtained however as the patient had been on antibiotics no organism was isolated.  Patient improved with treatment with Zosyn, patient completed 5 days of IV antibiotics, her mental status is back at baseline, her strength  is returned and she is discharged home in stable condition.  She should follow-up with her PCP in 3 to 7 days of discharge.    Day of Discharge     Vital Signs:  Temp:  [97.8 °F (36.6 °C)-98.9 °F (37.2 °C)] 97.9 °F (36.6 °C)  Heart Rate:  [59-77] 59  Resp:  [16-24] 18  BP: (140-178)/(48-73) 178/64    Physical Exam:   General appearance: NAD, conversant, confused  Eyes: anicteric sclerae, moist conjunctivae; no lid-lag; PERRLA  HENT: Atraumatic; oropharynx clear with moist mucous membranes and no mucosal ulcerations; normal hard and soft palate  Neck: Trachea midline; FROM, supple, no thyromegaly or lymphadenopathy  Lungs: CTA, with normal respiratory effort and no intercostal retractions  CV: Regular Rate and Rhythm, no Murmurs, Rubs, or Gallops   Abdomen: Soft, non-tender; no masses or Heptosplenomegally  Extremities: No peripheral edema or extremity lymphadenopathy  Skin: Normal temperature, turgor and texture; no rash, ulcers or subcutaneous nodules  Psych: Appropriate affect, alert and oriented to person, place and time  Neuro: CN II - XII intact no motor deficits, no sensory defecits    Discharge Details        Discharge Medications      Continue These Medications      Instructions Start Date   diphenoxylate-atropine 2.5-0.025 MG per tablet  Commonly known as: LOMOTIL   2 tablets, Oral, 2 times daily      donepezil 23 MG tablet  Commonly known as: ARICEPT   23 mg, Oral, Nightly      estradiol 0.025 MG/24HR patch  Commonly known as: CLIMARA   1 patch, Transdermal, Weekly      hydrOXYzine 10 MG tablet  Commonly known as: ATARAX   10 mg, Oral, Every Night at Bedtime      levothyroxine 50 MCG tablet  Commonly known as: SYNTHROID, LEVOTHROID   50 mcg, Oral, Daily      memantine 10 MG tablet  Commonly known as: Namenda   10 mg, Oral, 2 Times Daily      vitamin D 1.25 MG (99130 UT) capsule capsule  Commonly known as: ERGOCALCIFEROL   50,000 Units, Oral, Weekly         Stop These Medications     sulfamethoxazole-trimethoprim 800-160 MG per tablet  Commonly known as: BACTRIM DS,SEPTRA DS            Allergies   Allergen Reactions   • Levofloxacin Other (See Comments)     Joint pain   • Cefuroxime Axetil Rash   • Contrast Dye Rash   • Pravachol [Pravastatin] Other (See Comments)     muscle aches and cramping       Discharge Disposition:  Home or Self Care    Diet:  Hospital:  Diet Order   Procedures   • Diet Regular       Discharge Activity:       CODE STATUS:  Code Status and Medical Interventions:   Ordered at: 09/08/21 0625     Code Status:    CPR     Medical Interventions (Level of Support Prior to Arrest):    Full       Future Appointments   Date Time Provider Department Center   10/14/2021  9:15 [facility]   11/5/2021  9:30 AM Rabia Logan APRQUIN [facility]       [unfilled]    Pertinent  and/or Most Recent Results       IMAGING:  CT Abdomen Pelvis Without Contrast    Result Date: 9/7/2021  PROCEDURE: CT ABDOMEN PELVIS WO CONTRAST  COMPARISON: [facility], CT, CT CHEST ABD PEL W CONTRAST, 1/28/2020, 12:13.  INDICATIONS: GENERALIZED LOW ABDOMEN PAIN  TECHNIQUE: CT images were created without intravenous contrast.   PROTOCOL:   Standard imaging protocol performed    RADIATION:   DLP: 512.7mGy*cm   Automated exposure control was utilized to minimize radiation dose.  FINDINGS:  The lung bases are clear.  The unenhanced liver, gallbladder, adrenal glands, kidneys, spleen, and pancreas are unremarkable.  The stomach appears normal.  The small bowel appears normal in caliber and configuration.  There is sigmoid diverticulosis.  The appendix appears normal.  There is no ascites or loculated collection.  No abnormally enlarged lymph nodes are identified.  The rectum is unremarkable.  The uterus is surgically absent.  There is some wall thickening of the  urinary bladder.  No aggressive osseous lesions are identified.  CONCLUSION:  1. Some wall  thickening of urinary bladder.  This could be secondary to under distention or possible cystitis. 2. Sigmoid diverticulosis.     HONG JACOBO MD       Electronically Signed and Approved By: HONG JACOBO MD on 9/07/2021 at 8:33             XR Femur 2 View Left    Result Date: 9/7/2021  PROCEDURE: XR FEMUR 2 VW LEFT  COMPARISON: None  INDICATIONS: left femur pain, no known injury  FINDINGS:  No fractures are visualized.  No lytic or sclerotic bone lesions are seen.  Mild degenerative change consistent with osteoarthritis is seen in the hip and knee.  CONCLUSION:  Left femur series demonstrating no acute bony abnormality.      AYLIN WALLS MD       Electronically Signed and Approved By: AYLIN WALLS MD on 9/07/2021 at 8:06             CT Head Without Contrast    Result Date: 9/7/2021  PROCEDURE: CT HEAD WO CONTRAST  COMPARISON:  [facility], CT, HEAD W/O CONTRAST, 10/02/2019, 19:40. INDICATIONS: AMS/FELL/HIT LEFT TEMPLE AREA OF HEAD  PROTOCOL:   Standard imaging protocol performed    RADIATION:   DLP: 1018.2mGy*cm   MA and/or KV was adjusted to minimize radiation dose.     TECHNIQUE: After obtaining the patient's consent, CT images were obtained without non-ionic intravenous contrast material.  FINDINGS:  There is no acute intracranial hemorrhage or extra-axial collection. The ventricles appear normal in caliber, with no evidence of mass effect or midline shift. The basal cisterns are patent. The gray-white differentiation is preserved.  Scattered foci of periventricular and subcortical white matter hypodensities are nonspecific, but likely the sequela mild chronic small vessel ischemic disease.  The calvarium is intact. The paranasal sinuses and mastoid air cells are well-aerated.  CONCLUSION:  1. No acute intracranial process or calvarial fracture identified. 2. Findings suggestive of mild chronic small vessel ischemic disease.     HONG JACOBO MD       Electronically Signed and Approved  By: HONG JACOBO MD on 9/07/2021 at 14:18             XR Chest 1 View    Result Date: 9/7/2021  PROCEDURE: XR CHEST 1 VW  COMPARISON: [facility], , XR CHEST 1 VW, 9/07/2021, 6:22.  INDICATIONS: CHEST PAIN AFTER FALL  FINDINGS:  Heart size unchanged.  No dense consolidation.  No pleural fluid or pneumothorax.  CONCLUSION: No acute change from earlier today       STARR GARCIA MD       Electronically Signed and Approved By: STARR GARCIA MD on 9/07/2021 at 14:56             XR Chest 1 View    Result Date: 9/7/2021  PROCEDURE: XR CHEST 1 VW  COMPARISON: [facility], , CHEST AP/PA 1 VIEW, 10/02/2019, 19:57.  INDICATIONS: CHEST PAIN TRIAGE PROTOCOL.  FINDINGS:A single AP upright portable chest radiograph was performed.  No cardiac enlargement is seen.  No acute infiltrate is appreciated.  No pleural effusion or pneumothorax is identified.  External artifacts obscure detail.  There may be fibrotic changes of the bilateral lung bases, seen previously. Chronic calcified granulomatous disease involves the chest.  The thoracic aorta is atherosclerotic.  No significant interval change is seen since the prior study.  CONCLUSION:No acute infiltrate is appreciated.       RENAN SKELTON JR, MD       Electronically Signed and Approved By: RENAN SKELTON JR, MD on 9/07/2021 at 6:40             XR Hip With or Without Pelvis 2 - 3 View Left    Result Date: 9/7/2021  PROCEDURE: XR HIP W OR WO PELVIS 2-3 VIEW LEFT  COMPARISON: [facility], , LEFT HIP/PELVIS, 3/09/2005, 15:27.  INDICATIONS: LEFT HIP PAIN AFTER FALL TODAY  FINDINGS:  No fracture.  No dislocation.  Moderate bilateral hip arthrosis.  Degenerative changes in the included lower lumbar spine.  CONCLUSION: Moderate hip arthrosis.  No acute findings      STARR GARCIA MD       Electronically Signed and Approved By: STARR GARCIA MD on 9/07/2021 at 14:46               LAB RESULTS:      Lab 09/10/21  0427 09/09/21  0446 09/08/21  0853  09/08/21  0352 09/07/21  0818 09/07/21  0516   WBC 10.52 9.68  --  10.18  --  7.87   HEMOGLOBIN 12.6 13.5  --  13.6  --  13.7   HEMATOCRIT 38.5 42.2  --  41.4  --  42.3   PLATELETS 221 218  --  237  --  224   NEUTROS ABS 6.73 5.93  --  7.10*  --  4.77   IMMATURE GRANS (ABS) 0.06* 0.08*  --  0.06*  --  0.05   LYMPHS ABS 2.38 2.18  --  1.84  --  1.90   MONOS ABS 0.97* 1.04*  --  1.00*  --  0.84   EOS ABS 0.30 0.35  --  0.13  --  0.23   MCV 97.0 98.8*  --  96.7  --  97.7*   LACTATE  --  2.7* 3.5* 2.3* 1.9  --          Lab 09/10/21  0427 09/09/21  0446 09/08/21 0352 09/07/21  0516   SODIUM 139 137 140 138   POTASSIUM 4.0 4.0 4.0 4.1   CHLORIDE 105 103 105 103   CO2 24.0 21.4* 21.3* 24.9   ANION GAP 10.0 12.6 13.7 10.1   BUN 15 12 16 18   CREATININE 1.08* 1.16* 1.18* 1.06*   GLUCOSE 120* 134* 133* 133*   CALCIUM 8.7 8.9 9.4 9.3   MAGNESIUM 2.0 2.0 2.0 1.9   PHOSPHORUS 3.8  --   --   --          Lab 09/10/21  0427 09/08/21  0352 09/07/21  0516   TOTAL PROTEIN 6.2 7.5 7.1   ALBUMIN 3.60 4.20 4.00   GLOBULIN 2.6 3.3 3.1   ALT (SGPT) 14 14 11   AST (SGOT) 22 28 18   BILIRUBIN 0.5 0.5 0.3   ALK PHOS 66 74 79   LIPASE  --  27 40         Lab 09/07/21  0702 09/07/21  0516   PROBNP  --  99.6   TROPONIN T <0.010 <0.010                 Brief Urine Lab Results  (Last result in the past 365 days)      Color   Clarity   Blood   Leuk Est   Nitrite   Protein   CREAT   Urine HCG        09/08/21 0459 Yellow Cloudy Trace Large (3+) Negative 30 mg/dL (1+)             Microbiology Results (last 10 days)     Procedure Component Value - Date/Time    Urine Culture - Urine, Urine, Clean Catch [[id number]] Collected: 09/08/21 0459    Lab Status: Final result Specimen: Urine, Clean Catch Updated: 09/09/21 1416     Urine Culture 25,000 CFU/mL Mixed Nancy Isolated    Narrative:      Specimen contains mixed organisms of questionable pathogenicity which indicates contamination with commensal nancy.  Further identification is unlikely to provide  clinically useful information.  Suggest recollection.    Blood Culture - Blood, Blood, Venous Line [[id number]] Collected: 09/08/21 0351    Lab Status: Preliminary result Specimen: Blood, Venous Line Updated: 09/12/21 0415     Blood Culture No growth at 4 days    Blood Culture - Blood, Arm, Right [[id number]] Collected: 09/07/21 0818    Lab Status: Final result Specimen: Blood from Arm, Right Updated: 09/12/21 0845     Blood Culture No growth at 5 days    Blood Culture - Blood, Arm, Left [[id number]] Collected: 09/07/21 0759    Lab Status: Final result Specimen: Blood from Arm, Left Updated: 09/12/21 0815     Blood Culture No growth at 5 days                Time spent on Discharge including face to face service: Greater than 30 minutes      Electronically signed by Ok Boyd MD, 09/12/21, 11:35 AM EDT.

## 2021-09-12 NOTE — OUTREACH NOTE
Prep Survey      Responses   [facility] patient discharged from?  Call   Is LACE score < 7 ?  No   Emergency Room discharge w/ pulse ox?  No   Eligibility  [facility] Call   Date of Admission  09/08/21   Date of Discharge  09/12/21   Discharge Disposition  Home or Self Care   Discharge diagnosis  UTI, metabolic encephalopathy,  dementia   Does the patient have one of the following disease processes/diagnoses(primary or secondary)?  Other   Does the patient have Home health ordered?  No   Is there a DME ordered?  No   General alerts for this patient  Jorge is caregiver   Prep survey completed?  Yes          Lucy Miranda RN

## 2021-09-12 NOTE — PLAN OF CARE
Goal Outcome Evaluation:  Plan of Care Reviewed With: patient    Patient is being discharged. Discharge explained to patient and .    Cherie Cochran RN          Progress: improving

## 2021-09-12 NOTE — PLAN OF CARE
Goal Outcome Evaluation:  Plan of Care Reviewed With: patient        Progress: improving     Pt has no new issues or complaints. Vital signs stable. Possible discharge today.

## 2021-09-13 ENCOUNTER — TRANSITIONAL CARE MANAGEMENT TELEPHONE ENCOUNTER (OUTPATIENT)
Dept: CALL CENTER | Facility: HOSPITAL | Age: 66
End: 2021-09-13

## 2021-09-13 LAB — BACTERIA SPEC AEROBE CULT: NORMAL

## 2021-09-13 NOTE — OUTREACH NOTE
Call Center TCM Note      Responses   [facility] patient discharged from?  Call   Does the patient have one of the following disease processes/diagnoses(primary or secondary)?  Other   TCM attempt successful?  No   Unsuccessful attempts  Attempt 2          Aicha Mancera RN    9/13/2021, 15:12 EDT

## 2021-09-13 NOTE — OUTREACH NOTE
Call Center TCM Note      Responses   [facility] patient discharged from?  Call   Does the patient have one of the following disease processes/diagnoses(primary or secondary)?  Other   TCM attempt successful?  No   Unsuccessful attempts  Attempt 1          Aicha Mancera RN    9/13/2021, 13:26 EDT

## 2021-09-14 ENCOUNTER — TRANSITIONAL CARE MANAGEMENT TELEPHONE ENCOUNTER (OUTPATIENT)
Dept: CALL CENTER | Facility: HOSPITAL | Age: 66
End: 2021-09-14

## 2021-09-14 NOTE — OUTREACH NOTE
Call Center TCM Note      Responses   [facility] patient discharged from?  Call   Does the patient have one of the following disease processes/diagnoses(primary or secondary)?  Other   TCM attempt successful?  No   Unsuccessful attempts  Attempt 3          Aicha Mancera RN    9/14/2021, 09:31 EDT

## 2021-09-18 ENCOUNTER — APPOINTMENT (OUTPATIENT)
Dept: CT IMAGING | Facility: HOSPITAL | Age: 66
End: 2021-09-18

## 2021-09-18 ENCOUNTER — HOSPITAL ENCOUNTER (OUTPATIENT)
Facility: HOSPITAL | Age: 66
Setting detail: OBSERVATION
Discharge: HOME | End: 2021-09-20
Attending: EMERGENCY MEDICINE | Admitting: INTERNAL MEDICINE

## 2021-09-18 DIAGNOSIS — I10 HYPERTENSION, UNSPECIFIED TYPE: ICD-10-CM

## 2021-09-18 DIAGNOSIS — R53.1 WEAKNESS: ICD-10-CM

## 2021-09-18 DIAGNOSIS — R26.2 DIFFICULTY WALKING: ICD-10-CM

## 2021-09-18 DIAGNOSIS — Z78.9 DECREASED ACTIVITIES OF DAILY LIVING (ADL): ICD-10-CM

## 2021-09-18 DIAGNOSIS — R10.9 ABDOMINAL PAIN, UNSPECIFIED ABDOMINAL LOCATION: Primary | ICD-10-CM

## 2021-09-18 PROBLEM — R41.82 AMS (ALTERED MENTAL STATUS): Status: ACTIVE | Noted: 2021-09-18

## 2021-09-18 LAB
ALBUMIN SERPL-MCNC: 4 G/DL (ref 3.5–5.2)
ALBUMIN/GLOB SERPL: 1.2 G/DL
ALP SERPL-CCNC: 77 U/L (ref 39–117)
ALT SERPL W P-5'-P-CCNC: 14 U/L (ref 1–33)
ANION GAP SERPL CALCULATED.3IONS-SCNC: 13.3 MMOL/L (ref 5–15)
AST SERPL-CCNC: 21 U/L (ref 1–32)
BASOPHILS # BLD AUTO: 0.06 10*3/MM3 (ref 0–0.2)
BASOPHILS NFR BLD AUTO: 0.8 % (ref 0–1.5)
BILIRUB SERPL-MCNC: 0.3 MG/DL (ref 0–1.2)
BILIRUB UR QL STRIP: NEGATIVE
BUN SERPL-MCNC: 15 MG/DL (ref 8–23)
BUN/CREAT SERPL: 14.9 (ref 7–25)
CALCIUM SPEC-SCNC: 9.6 MG/DL (ref 8.6–10.5)
CHLORIDE SERPL-SCNC: 104 MMOL/L (ref 98–107)
CLARITY UR: CLEAR
CO2 SERPL-SCNC: 22.7 MMOL/L (ref 22–29)
COLOR UR: YELLOW
CREAT SERPL-MCNC: 1.01 MG/DL (ref 0.57–1)
D-LACTATE SERPL-SCNC: 1.2 MMOL/L (ref 0.5–2)
DEPRECATED RDW RBC AUTO: 47.1 FL (ref 37–54)
EOSINOPHIL # BLD AUTO: 0.22 10*3/MM3 (ref 0–0.4)
EOSINOPHIL NFR BLD AUTO: 3 % (ref 0.3–6.2)
ERYTHROCYTE [DISTWIDTH] IN BLOOD BY AUTOMATED COUNT: 13.2 % (ref 12.3–15.4)
GFR SERPL CREATININE-BSD FRML MDRD: 55 ML/MIN/1.73
GLOBULIN UR ELPH-MCNC: 3.4 GM/DL
GLUCOSE SERPL-MCNC: 116 MG/DL (ref 65–99)
GLUCOSE UR STRIP-MCNC: NEGATIVE MG/DL
HCT VFR BLD AUTO: 43.1 % (ref 34–46.6)
HGB BLD-MCNC: 13.8 G/DL (ref 12–15.9)
HGB UR QL STRIP.AUTO: NEGATIVE
HOLD SPECIMEN: NORMAL
HOLD SPECIMEN: NORMAL
IMM GRANULOCYTES # BLD AUTO: 0.04 10*3/MM3 (ref 0–0.05)
IMM GRANULOCYTES NFR BLD AUTO: 0.5 % (ref 0–0.5)
KETONES UR QL STRIP: NEGATIVE
LEUKOCYTE ESTERASE UR QL STRIP.AUTO: NEGATIVE
LIPASE SERPL-CCNC: 38 U/L (ref 13–60)
LYMPHOCYTES # BLD AUTO: 1.62 10*3/MM3 (ref 0.7–3.1)
LYMPHOCYTES NFR BLD AUTO: 22.1 % (ref 19.6–45.3)
MCH RBC QN AUTO: 31.5 PG (ref 26.6–33)
MCHC RBC AUTO-ENTMCNC: 32 G/DL (ref 31.5–35.7)
MCV RBC AUTO: 98.4 FL (ref 79–97)
MONOCYTES # BLD AUTO: 0.57 10*3/MM3 (ref 0.1–0.9)
MONOCYTES NFR BLD AUTO: 7.8 % (ref 5–12)
NEUTROPHILS NFR BLD AUTO: 4.81 10*3/MM3 (ref 1.7–7)
NEUTROPHILS NFR BLD AUTO: 65.8 % (ref 42.7–76)
NITRITE UR QL STRIP: NEGATIVE
NRBC BLD AUTO-RTO: 0 /100 WBC (ref 0–0.2)
PH UR STRIP.AUTO: 6.5 [PH] (ref 5–8)
PLATELET # BLD AUTO: 256 10*3/MM3 (ref 140–450)
PMV BLD AUTO: 9.1 FL (ref 6–12)
POTASSIUM SERPL-SCNC: 4.2 MMOL/L (ref 3.5–5.2)
PROT SERPL-MCNC: 7.4 G/DL (ref 6–8.5)
PROT UR QL STRIP: NEGATIVE
RBC # BLD AUTO: 4.38 10*6/MM3 (ref 3.77–5.28)
SODIUM SERPL-SCNC: 140 MMOL/L (ref 136–145)
SP GR UR STRIP: 1.01 (ref 1–1.03)
UROBILINOGEN UR QL STRIP: NORMAL
WBC # BLD AUTO: 7.32 10*3/MM3 (ref 3.4–10.8)
WHOLE BLOOD HOLD SPECIMEN: NORMAL
WHOLE BLOOD HOLD SPECIMEN: NORMAL

## 2021-09-18 PROCEDURE — 74176 CT ABD & PELVIS W/O CONTRAST: CPT

## 2021-09-18 PROCEDURE — 87086 URINE CULTURE/COLONY COUNT: CPT | Performed by: INTERNAL MEDICINE

## 2021-09-18 PROCEDURE — 83605 ASSAY OF LACTIC ACID: CPT | Performed by: EMERGENCY MEDICINE

## 2021-09-18 PROCEDURE — 70450 CT HEAD/BRAIN W/O DYE: CPT

## 2021-09-18 PROCEDURE — 99284 EMERGENCY DEPT VISIT MOD MDM: CPT

## 2021-09-18 PROCEDURE — 81003 URINALYSIS AUTO W/O SCOPE: CPT | Performed by: EMERGENCY MEDICINE

## 2021-09-18 PROCEDURE — 36415 COLL VENOUS BLD VENIPUNCTURE: CPT

## 2021-09-18 PROCEDURE — 80053 COMPREHEN METABOLIC PANEL: CPT

## 2021-09-18 PROCEDURE — 85025 COMPLETE CBC W/AUTO DIFF WBC: CPT

## 2021-09-18 PROCEDURE — 83690 ASSAY OF LIPASE: CPT

## 2021-09-18 PROCEDURE — 99220 PR INITIAL OBSERVATION CARE/DAY 70 MINUTES: CPT | Performed by: FAMILY MEDICINE

## 2021-09-18 RX ORDER — SODIUM CHLORIDE 0.9 % (FLUSH) 0.9 %
10 SYRINGE (ML) INJECTION AS NEEDED
Status: DISCONTINUED | OUTPATIENT
Start: 2021-09-18 | End: 2021-09-19

## 2021-09-18 RX ADMIN — SODIUM CHLORIDE 1000 ML: 9 INJECTION, SOLUTION INTRAVENOUS at 16:57

## 2021-09-18 NOTE — ED PROVIDER NOTES
Time: 4:10 PM EDT  Arrived by: private car  Chief Complaint: Pain  History provided by: Pt's , patient  History is limited by: Dementia     History of Present Illness:  Patient is a 66 y.o. year old female that presents to the emergency department with severe constant generalized weakness that has gradually worsened over the past few days.     The patient was recently admitted into the hospital for a UTI. On Sunday, the patient was discharged home. After a few days, the patient began to experience worsening generalized weakness. Today, she has been complaining of odorous urine, abdominal pain, and leg spasms. The leg spasms occur intermittently every few minutes. There has been no fever, constipation, nausea, vomiting, or diarrhea. There have been no new falls since she was discharged from the hospital.      Weakness - Generalized  Severity:  Severe  Onset quality:  Gradual  Duration:  4 days  Timing:  Constant  Progression:  Worsening  Associated symptoms: abdominal pain    Associated symptoms: no chest pain, no cough, no diarrhea, no fever, no headaches, no nausea, no seizures, no shortness of breath and no vomiting        Patient Care Team  Primary Care Provider: Rabia Logan APRN    Past Medical History:     Allergies   Allergen Reactions   • Levofloxacin Other (See Comments)     Joint pain   • Cefuroxime Axetil Rash   • Contrast Dye Rash   • Pravachol [Pravastatin] Other (See Comments)     muscle aches and cramping     Past Medical History:   Diagnosis Date   • Arthritis    • Bladder problem    • Cerebrovascular disease 06/11/2015   • Dementia (CMS/[facility]) 07/23/2018    3yrs memory change, neuropsych test 2015.on aricept switched to memantine. MRI of brain revealed few MILD scattered FLAIR hyperintensities. Reluctant to attribute to memory issues. noted to have atrophy of the bitemporal region a component of Alzheimer's dementia. Refer for repeat neuropsych testing. titrate her memory/ 10 mg  twice per day   • Hepatitis A    • Hypertension    • Hypomagnesemia 01/11/2016   • Hypothyroidism 05/09/2015   • Leg pain    • Memory loss 05/09/2015   • Microhematuria    • Muscle cramps    • Obsessive compulsive disorder    • Pap smear for cervical cancer screening     no longer   • Postmenopausal syndrome 02/01/2017   • Thyroid disease    • Vascular dementia (CMS/HCC) 08/10/2015   • Vascular disease, peripheral (CMS/HCC)    • Vitamin D deficiency      Past Surgical History:   Procedure Laterality Date   • BACK SURGERY     • BLADDER REPAIR  2000   • COLONOSCOPY  06/2020    POLYP   • CYSTOSCOPY  05/28/2020   • HYSTERECTOMY      partial   • LAMINECTOMY  1989    L5-S1   • TUBAL ABDOMINAL LIGATION  1979     Family History   Problem Relation Age of Onset   • Hypertension Mother    • Cancer Mother    • Heart disease Father    • Hypertension Father    • Diabetes Father    • Cancer Father    • Lung cancer Brother    • Stomach cancer Other         Aunt   • Lung cancer Other         uncle       Home Medications:  Prior to Admission medications    Medication Sig Start Date End Date Taking? Authorizing Provider   diphenoxylate-atropine (LOMOTIL) 2.5-0.025 MG per tablet Take 2 tablets by mouth 2 (two) times a day.    Provider, MD Mere   donepezil (ARICEPT) 23 MG tablet Take 1 tablet by mouth Every Night. 7/9/21   Rabia Logan APRN   estradiol (CLIMARA) 0.025 MG/24HR patch Place 1 patch on the skin as directed by provider 1 (One) Time Per Week. 7/9/21   Rabia Logan APRN   hydrOXYzine (ATARAX) 10 MG tablet Take 1 tablet by mouth every night at bedtime. 7/9/21   Rabia Logan APRN   levothyroxine (SYNTHROID, LEVOTHROID) 50 MCG tablet Take 1 tablet by mouth Daily. 7/9/21   Rabia Logan APRN   memantine (Namenda) 10 MG tablet Take 1 tablet by mouth 2 (Two) Times a Day. 7/9/21   Rabia Logan APRN   vitamin D (ERGOCALCIFEROL) 1.25 MG (52114 UT) capsule capsule Take 1  "capsule by mouth 1 (One) Time Per Week. 7/9/21   Desmond Rabiadakota Bermeo, AUDREY        Social History:   Social History     Tobacco Use   • Smoking status: Never Smoker   Substance Use Topics   • Alcohol use: Never     Comment: does not drink   • Drug use: Never     Recent travel: not applicable     Review of Systems:  Review of Systems   Constitutional: Negative for chills and fever.   HENT: Negative for congestion, ear pain and sore throat.    Eyes: Negative for pain.   Respiratory: Negative for cough, chest tightness and shortness of breath.    Cardiovascular: Negative for chest pain.   Gastrointestinal: Positive for abdominal pain. Negative for diarrhea, nausea and vomiting.   Genitourinary: Negative for hematuria.        Odorous urine   Musculoskeletal: Negative for joint swelling.        Bilateral leg spasms   Skin: Negative for pallor.   Neurological: Positive for weakness (generalized). Negative for seizures and headaches.   All other systems reviewed and are negative.       Physical Exam:  /68 (Patient Position: Lying)   Pulse 69   Temp 97.7 °F (36.5 °C) (Oral)   Resp 18   Ht 175.3 cm (69\")   Wt 79.4 kg (175 lb 0.7 oz)   SpO2 97%   BMI 25.85 kg/m²     Physical Exam  Vitals and nursing note reviewed.   Constitutional:       General: She is not in acute distress.     Appearance: Normal appearance. She is not toxic-appearing.   HENT:      Head: Normocephalic and atraumatic.      Mouth/Throat:      Mouth: Mucous membranes are moist.   Eyes:      Extraocular Movements: Extraocular movements intact.      Pupils: Pupils are equal, round, and reactive to light.   Cardiovascular:      Rate and Rhythm: Normal rate and regular rhythm.      Pulses: Normal pulses.      Heart sounds: Normal heart sounds.   Pulmonary:      Effort: Pulmonary effort is normal. No respiratory distress.      Breath sounds: Normal breath sounds.   Abdominal:      General: Abdomen is flat. Bowel sounds are normal.      Palpations: " Abdomen is soft.      Tenderness: There is abdominal tenderness (LLQ). There is guarding (mild). There is no rebound.   Musculoskeletal:         General: Normal range of motion.      Cervical back: Normal range of motion and neck supple.   Skin:     General: Skin is warm and dry.   Neurological:      Mental Status: She is alert. She is disoriented.      Motor: Weakness (generalized) present.                Medications in the Emergency Department:  Medications   sodium chloride 0.9 % flush 10 mL (has no administration in time range)   sodium chloride 0.9 % bolus 1,000 mL (0 mL Intravenous Stopped 9/18/21 1917)   dicyclomine (BENTYL) injection 10 mg (10 mg Intramuscular Given 9/19/21 0024)        Labs  Lab Results (last 24 hours)     Procedure Component Value Units Date/Time    CBC & Differential [[id number]]  (Abnormal) Collected: 09/18/21 1338    Specimen: Blood from Arm, Right Updated: 09/18/21 1414    Narrative:      The following orders were created for panel order CBC & Differential.  Procedure                               Abnormality         Status                     ---------                               -----------         ------                     CBC Auto Differential[[id number]]        Abnormal            Final result                 Please view results for these tests on the individual orders.    Comprehensive Metabolic Panel [[id number]]  (Abnormal) Collected: 09/18/21 1338    Specimen: Blood from Arm, Right Updated: 09/18/21 1444     Glucose 116 mg/dL      BUN 15 mg/dL      Creatinine 1.01 mg/dL      Sodium 140 mmol/L      Potassium 4.2 mmol/L      Chloride 104 mmol/L      CO2 22.7 mmol/L      Calcium 9.6 mg/dL      Total Protein 7.4 g/dL      Albumin 4.00 g/dL      ALT (SGPT) 14 U/L      AST (SGOT) 21 U/L      Alkaline Phosphatase 77 U/L      Total Bilirubin 0.3 mg/dL      eGFR Non African Amer 55 mL/min/1.73      Globulin 3.4 gm/dL      A/G Ratio 1.2 g/dL      BUN/Creatinine Ratio 14.9     Anion Gap  13.3 mmol/L     Narrative:      GFR Normal >60  Chronic Kidney Disease <60  Kidney Failure <15      Lipase [[id number]]  (Normal) Collected: 09/18/21 1338    Specimen: Blood from Arm, Right Updated: 09/18/21 1444     Lipase 38 U/L     CBC Auto Differential [[id number]]  (Abnormal) Collected: 09/18/21 1338    Specimen: Blood from Arm, Right Updated: 09/18/21 1414     WBC 7.32 10*3/mm3      RBC 4.38 10*6/mm3      Hemoglobin 13.8 g/dL      Hematocrit 43.1 %      MCV 98.4 fL      MCH 31.5 pg      MCHC 32.0 g/dL      RDW 13.2 %      RDW-SD 47.1 fl      MPV 9.1 fL      Platelets 256 10*3/mm3      Neutrophil % 65.8 %      Lymphocyte % 22.1 %      Monocyte % 7.8 %      Eosinophil % 3.0 %      Basophil % 0.8 %      Immature Grans % 0.5 %      Neutrophils, Absolute 4.81 10*3/mm3      Lymphocytes, Absolute 1.62 10*3/mm3      Monocytes, Absolute 0.57 10*3/mm3      Eosinophils, Absolute 0.22 10*3/mm3      Basophils, Absolute 0.06 10*3/mm3      Immature Grans, Absolute 0.04 10*3/mm3      nRBC 0.0 /100 WBC     Urinalysis With Microscopic If Indicated (No Culture) - Urine, Clean Catch [[id number]]  (Normal) Collected: 09/18/21 1732    Specimen: Urine, Clean Catch Updated: 09/18/21 1746     Color, UA Yellow     Appearance, UA Clear     pH, UA 6.5     Specific Gravity, UA 1.009     Glucose, UA Negative     Ketones, UA Negative     Bilirubin, UA Negative     Blood, UA Negative     Protein, UA Negative     Leuk Esterase, UA Negative     Nitrite, UA Negative     Urobilinogen, UA 0.2 E.U./dL    Narrative:      Urine microscopic not indicated.    Lactic Acid, Plasma [[id number]]  (Normal) Collected: 09/18/21 2213    Specimen: Blood Updated: 09/18/21 2237     Lactate 1.2 mmol/L            Imaging:  CT Abdomen Pelvis Without Contrast    Result Date: 9/18/2021  PROCEDURE: CT ABDOMEN PELVIS WO CONTRAST  COMPARISON: [facility], CT, CT ABDOMEN PELVIS WO CONTRAST, 9/18/2021, 18:02.  INDICATIONS: abdominal pain  TECHNIQUE: CT images  were created without intravenous contrast.   PROTOCOL:   Standard imaging protocol performed    RADIATION:   DLP: 540.4mGy*cm   Automated exposure control was utilized to minimize radiation dose.  FINDINGS:  Lower chest demonstrates mild scarring at the right middle lobe and lingula.  No pleural effusion.  Negative for pericardial effusion.  Tiny hiatal hernia.  Lack of contrast limits assessment of abdominal organs and vasculature.  The liver and spleen are normal in size and contour.  Adrenal glands normal.  Pancreas without pancreatitis.  Gallbladder present.  No pericholecystic inflammation.  The kidneys are without hydronephrosis.  Punctate 1 mm nonobstructing left lower pole renal calculus.  No hydroureteronephrosis.  No obstructive uropathy.  Urinary bladder is thin-walled.  There are innumerable calcified phleboliths in the pelvis.  Uterus absent.  No adnexal mass.  Negative for pneumoperitoneum.  Oral contrast reaches the level of the rectum.  There is no extraluminal contrast identified.  There is extensive diverticulosis involving the sigmoid colon without diverticulitis.  No findings of appendicitis.  Mild to moderate atherosclerotic calcifications of the infrarenal aorta.  There is a diverticulum along the posterior aspect of the gastric fundus.  There is mild anterolisthesis of L3 on L4 measuring 2 mm in mild anterolisthesis of L4 on L5 measuring 2 mm related to facet arthropathy.  Negative for acute fracture.  CONCLUSION:  1. No acute abnormality in the abdomen or pelvis. 2. Negative for pneumoperitoneum.  Finding on prior CT related to volume averaging with right middle lobe.  No extraluminal contrast or findings to indicate bowel perforation. 3. Sigmoid diverticulosis without diverticulitis. 4. Additional incidental findings above.      LORENE ZHANG MD       Electronically Signed and Approved By: LORENE ZHANG MD on 9/18/2021 at 21:13             CT Abdomen Pelvis Without Contrast    Result Date:  9/18/2021  PROCEDURE: CT ABDOMEN PELVIS WO CONTRAST  COMPARISON: [facility], CT, CT ABDOMEN PELVIS WO CONTRAST, 9/07/2021, 7:13.  INDICATIONS: LLQ pain and tenderness  TECHNIQUE: CT images were created without intravenous contrast.   PROTOCOL:   Standard imaging protocol performed    RADIATION:   DLP: 536.9 mGy*cm   Automated exposure control was utilized to minimize radiation dose.  FINDINGS:  There is no definite abnormality of the liver or spleen.  There is a small hiatal hernia.  It looks like there may be a gastric diverticulum in the upper abdomen as well.  There is an air shadow around the area of the diaphragm.  It is hard to tell whether this is intraluminal.  Another questionable area of possible free air suggested at the right lung base.   There is no definite pancreatic abnormality.  No acute renal abnormality is confirmed.  There are colonic diverticula particularly in the sigmoid colon area without definitive changes of acute diverticulitis.  The bladder does not appear unusual for the degree of distention.  There is questionable stranding within the mesenteric fat in the left quadrant.  Whether this might relate to an underlying enteritis is uncertain.  There is no bowel obstruction.  There is some vascular calcification present.  The patient has had a hysterectomy.  There are degenerative changes involving the lumbar spine.  CONCLUSION:  1. Unusual air shadow around the level of the diaphragm in the area of the GI junction.  Some free intraperitoneal cannot be excluded.  It does look like there is probably a diverticulum of the stomach and small hiatal hernia.  A follow up CT with water-soluble contrast might be helpful to further evaluate the upper abdomen.  This might reflect sequela to peptic ulcer disease. 2. Suggested stranding within the fat within the mesentery in the left quadrant.  This is adjacent to small bowel.  Whether this might reflect sequela to an enteritis is  uncertain. 3. Colonic diverticulosis 4. Degenerative change lumbar spine.  Report called to ER.    SAUMYA REYES MD       Electronically Signed and Approved By: SAUMYA REYES MD on 9/18/2021 at 18:29             CT Head Without Contrast    Result Date: 9/18/2021  PROCEDURE: CT HEAD WO CONTRAST  COMPARISON:  [facility], CT, CT HEAD WO CONTRAST, 9/07/2021, 14:12. INDICATIONS: unilateral weakness  PROTOCOL:   Standard imaging protocol performed    RADIATION:   DLP: 954.8 mGy*cm   MA and/or KV was adjusted to minimize radiation dose.     TECHNIQUE: After obtaining the patient's consent, CT images were obtained without non-ionic intravenous contrast material.  FINDINGS:  There is global atrophy.  There are some white matter changes involving the cerebral hemispheres which could reflect more chronic small vessel ischemic change.  There is no underlying hemorrhage.  There is no midline shift.  The paranasal sinuses seem relatively clear.  There is no acute abnormality of the calvarium.  CONCLUSION:  1. Cerebral atrophy. 2. There are white matter changes which could reflect more chronic small vessel ischemic change.     SAUMYA REYES MD       Electronically Signed and Approved By: SAUMYA REYES MD on 9/18/2021 at 15:50               Procedures:  Procedures    Progress                            Medical Decision Making:  MDM  Number of Diagnoses or Management Options     Amount and/or Complexity of Data Reviewed  Clinical lab tests: reviewed  Tests in the radiology section of CPT®: reviewed  Decide to obtain previous medical records or to obtain history from someone other than the patient: yes  Obtain history from someone other than the patient: yes  Review and summarize past medical records: yes  Discuss the patient with other providers: yes  Independent visualization of images, tracings, or specimens: yes    Patient Progress  Patient progress: stable       Final diagnoses:   Abdominal pain, unspecified abdominal  location   Weakness        Disposition:  ED Disposition     ED Disposition Condition Comment    Decision to Admit  Level of Care: Telemetry [5]   Diagnosis: AMS (altered mental status) [[id number]]   Admitting Physician: HONG PAGAN [[id number]]   Attending Physician: HONG PAGAN [[id number]]            This medical record created using voice recognition software and may contain unintended errors.    Documentation assistance provided by Loree Fuentes acting as scribe for Ashkan Gaming DO. Information recorded by the scribe was done at my direction and has been verified and validated by me.        Loree Fuentes  09/18/21 1629       Loree Fuentes  09/18/21 1634       Ashkan Gaming DO  09/19/21 0236

## 2021-09-19 ENCOUNTER — READMISSION MANAGEMENT (OUTPATIENT)
Dept: CALL CENTER | Facility: HOSPITAL | Age: 66
End: 2021-09-19

## 2021-09-19 LAB
ANION GAP SERPL CALCULATED.3IONS-SCNC: 11 MMOL/L (ref 5–15)
BASOPHILS # BLD AUTO: 0.09 10*3/MM3 (ref 0–0.2)
BASOPHILS NFR BLD AUTO: 1.3 % (ref 0–1.5)
BUN SERPL-MCNC: 11 MG/DL (ref 8–23)
BUN/CREAT SERPL: 13.3 (ref 7–25)
CALCIUM SPEC-SCNC: 9.1 MG/DL (ref 8.6–10.5)
CHLORIDE SERPL-SCNC: 103 MMOL/L (ref 98–107)
CO2 SERPL-SCNC: 23 MMOL/L (ref 22–29)
CREAT SERPL-MCNC: 0.83 MG/DL (ref 0.57–1)
DEPRECATED RDW RBC AUTO: 47.8 FL (ref 37–54)
EOSINOPHIL # BLD AUTO: 0.2 10*3/MM3 (ref 0–0.4)
EOSINOPHIL NFR BLD AUTO: 2.8 % (ref 0.3–6.2)
ERYTHROCYTE [DISTWIDTH] IN BLOOD BY AUTOMATED COUNT: 13.3 % (ref 12.3–15.4)
GFR SERPL CREATININE-BSD FRML MDRD: 69 ML/MIN/1.73
GLUCOSE SERPL-MCNC: 118 MG/DL (ref 65–99)
HCT VFR BLD AUTO: 41.8 % (ref 34–46.6)
HGB BLD-MCNC: 13.4 G/DL (ref 12–15.9)
IMM GRANULOCYTES # BLD AUTO: 0.04 10*3/MM3 (ref 0–0.05)
IMM GRANULOCYTES NFR BLD AUTO: 0.6 % (ref 0–0.5)
LYMPHOCYTES # BLD AUTO: 1.53 10*3/MM3 (ref 0.7–3.1)
LYMPHOCYTES NFR BLD AUTO: 21.7 % (ref 19.6–45.3)
MCH RBC QN AUTO: 31.3 PG (ref 26.6–33)
MCHC RBC AUTO-ENTMCNC: 32.1 G/DL (ref 31.5–35.7)
MCV RBC AUTO: 97.7 FL (ref 79–97)
MONOCYTES # BLD AUTO: 0.64 10*3/MM3 (ref 0.1–0.9)
MONOCYTES NFR BLD AUTO: 9.1 % (ref 5–12)
NEUTROPHILS NFR BLD AUTO: 4.54 10*3/MM3 (ref 1.7–7)
NEUTROPHILS NFR BLD AUTO: 64.5 % (ref 42.7–76)
NRBC BLD AUTO-RTO: 0 /100 WBC (ref 0–0.2)
PLATELET # BLD AUTO: 231 10*3/MM3 (ref 140–450)
PMV BLD AUTO: 9 FL (ref 6–12)
POTASSIUM SERPL-SCNC: 3.9 MMOL/L (ref 3.5–5.2)
RBC # BLD AUTO: 4.28 10*6/MM3 (ref 3.77–5.28)
SODIUM SERPL-SCNC: 137 MMOL/L (ref 136–145)
WBC # BLD AUTO: 7.04 10*3/MM3 (ref 3.4–10.8)

## 2021-09-19 PROCEDURE — 97116 GAIT TRAINING THERAPY: CPT

## 2021-09-19 PROCEDURE — G0378 HOSPITAL OBSERVATION PER HR: HCPCS

## 2021-09-19 PROCEDURE — 85025 COMPLETE CBC W/AUTO DIFF WBC: CPT | Performed by: FAMILY MEDICINE

## 2021-09-19 PROCEDURE — 99225 PR SBSQ OBSERVATION CARE/DAY 25 MINUTES: CPT | Performed by: INTERNAL MEDICINE

## 2021-09-19 PROCEDURE — 96372 THER/PROPH/DIAG INJ SC/IM: CPT

## 2021-09-19 PROCEDURE — 97161 PT EVAL LOW COMPLEX 20 MIN: CPT

## 2021-09-19 PROCEDURE — 25010000002 DICYCLOMINE PER 20 MG: Performed by: EMERGENCY MEDICINE

## 2021-09-19 PROCEDURE — 80048 BASIC METABOLIC PNL TOTAL CA: CPT | Performed by: FAMILY MEDICINE

## 2021-09-19 RX ORDER — ACETAMINOPHEN 650 MG/1
650 SUPPOSITORY RECTAL EVERY 4 HOURS PRN
Status: DISCONTINUED | OUTPATIENT
Start: 2021-09-19 | End: 2021-09-20 | Stop reason: HOSPADM

## 2021-09-19 RX ORDER — ONDANSETRON 2 MG/ML
4 INJECTION INTRAMUSCULAR; INTRAVENOUS EVERY 6 HOURS PRN
Status: DISCONTINUED | OUTPATIENT
Start: 2021-09-19 | End: 2021-09-20 | Stop reason: HOSPADM

## 2021-09-19 RX ORDER — PANTOPRAZOLE SODIUM 40 MG/1
40 TABLET, DELAYED RELEASE ORAL
Status: DISCONTINUED | OUTPATIENT
Start: 2021-09-19 | End: 2021-09-20 | Stop reason: HOSPADM

## 2021-09-19 RX ORDER — DIPHENOXYLATE HYDROCHLORIDE AND ATROPINE SULFATE 2.5; .025 MG/1; MG/1
2 TABLET ORAL 4 TIMES DAILY PRN
Status: DISCONTINUED | OUTPATIENT
Start: 2021-09-19 | End: 2021-09-20 | Stop reason: HOSPADM

## 2021-09-19 RX ORDER — SODIUM CHLORIDE 0.9 % (FLUSH) 0.9 %
10 SYRINGE (ML) INJECTION EVERY 12 HOURS SCHEDULED
Status: DISCONTINUED | OUTPATIENT
Start: 2021-09-19 | End: 2021-09-20 | Stop reason: HOSPADM

## 2021-09-19 RX ORDER — DICYCLOMINE HYDROCHLORIDE 10 MG/ML
10 INJECTION INTRAMUSCULAR ONCE
Status: COMPLETED | OUTPATIENT
Start: 2021-09-19 | End: 2021-09-19

## 2021-09-19 RX ORDER — DONEPEZIL HYDROCHLORIDE 10 MG/1
20 TABLET, FILM COATED ORAL NIGHTLY
Refills: 1 | Status: DISCONTINUED | OUTPATIENT
Start: 2021-09-19 | End: 2021-09-20 | Stop reason: HOSPADM

## 2021-09-19 RX ORDER — SODIUM CHLORIDE 0.9 % (FLUSH) 0.9 %
10 SYRINGE (ML) INJECTION AS NEEDED
Status: DISCONTINUED | OUTPATIENT
Start: 2021-09-19 | End: 2021-09-20 | Stop reason: HOSPADM

## 2021-09-19 RX ORDER — MEMANTINE HYDROCHLORIDE 10 MG/1
10 TABLET ORAL 2 TIMES DAILY
Status: DISCONTINUED | OUTPATIENT
Start: 2021-09-19 | End: 2021-09-20 | Stop reason: HOSPADM

## 2021-09-19 RX ORDER — BISACODYL 10 MG
10 SUPPOSITORY, RECTAL RECTAL DAILY PRN
Status: DISCONTINUED | OUTPATIENT
Start: 2021-09-19 | End: 2021-09-19

## 2021-09-19 RX ORDER — BISACODYL 5 MG/1
5 TABLET, DELAYED RELEASE ORAL DAILY PRN
Status: DISCONTINUED | OUTPATIENT
Start: 2021-09-19 | End: 2021-09-19

## 2021-09-19 RX ORDER — AMOXICILLIN 250 MG
2 CAPSULE ORAL 2 TIMES DAILY
Status: DISCONTINUED | OUTPATIENT
Start: 2021-09-19 | End: 2021-09-19

## 2021-09-19 RX ORDER — ACETAMINOPHEN 325 MG/1
650 TABLET ORAL EVERY 4 HOURS PRN
Status: DISCONTINUED | OUTPATIENT
Start: 2021-09-19 | End: 2021-09-20 | Stop reason: HOSPADM

## 2021-09-19 RX ORDER — HYDROXYZINE HYDROCHLORIDE 10 MG/1
10 TABLET, FILM COATED ORAL NIGHTLY PRN
Status: DISCONTINUED | OUTPATIENT
Start: 2021-09-19 | End: 2021-09-20 | Stop reason: HOSPADM

## 2021-09-19 RX ORDER — LEVOTHYROXINE SODIUM 0.05 MG/1
50 TABLET ORAL EVERY MORNING
Status: DISCONTINUED | OUTPATIENT
Start: 2021-09-19 | End: 2021-09-20 | Stop reason: HOSPADM

## 2021-09-19 RX ORDER — ACETAMINOPHEN 160 MG/5ML
650 SOLUTION ORAL EVERY 4 HOURS PRN
Status: DISCONTINUED | OUTPATIENT
Start: 2021-09-19 | End: 2021-09-20 | Stop reason: HOSPADM

## 2021-09-19 RX ORDER — POLYETHYLENE GLYCOL 3350 17 G/17G
17 POWDER, FOR SOLUTION ORAL DAILY PRN
Status: DISCONTINUED | OUTPATIENT
Start: 2021-09-19 | End: 2021-09-19

## 2021-09-19 RX ORDER — AMLODIPINE BESYLATE 5 MG/1
5 TABLET ORAL
Status: DISCONTINUED | OUTPATIENT
Start: 2021-09-19 | End: 2021-09-20 | Stop reason: HOSPADM

## 2021-09-19 RX ADMIN — AMLODIPINE BESYLATE 5 MG: 5 TABLET ORAL at 09:58

## 2021-09-19 RX ADMIN — ACETAMINOPHEN 650 MG: 325 TABLET ORAL at 09:58

## 2021-09-19 RX ADMIN — DONEPEZIL HYDROCHLORIDE 20 MG: 10 TABLET, FILM COATED ORAL at 20:32

## 2021-09-19 RX ADMIN — SODIUM CHLORIDE, PRESERVATIVE FREE 10 ML: 5 INJECTION INTRAVENOUS at 09:59

## 2021-09-19 RX ADMIN — LEVOTHYROXINE SODIUM 50 MCG: 50 TABLET ORAL at 13:06

## 2021-09-19 RX ADMIN — DICYCLOMINE HYDROCHLORIDE 10 MG: 20 INJECTION, SOLUTION INTRAMUSCULAR at 00:24

## 2021-09-19 RX ADMIN — PANTOPRAZOLE SODIUM 40 MG: 40 TABLET, DELAYED RELEASE ORAL at 09:58

## 2021-09-19 RX ADMIN — MEMANTINE 10 MG: 10 TABLET ORAL at 13:06

## 2021-09-19 RX ADMIN — SODIUM CHLORIDE, PRESERVATIVE FREE 10 ML: 5 INJECTION INTRAVENOUS at 20:32

## 2021-09-19 RX ADMIN — MEMANTINE 10 MG: 10 TABLET ORAL at 20:32

## 2021-09-19 NOTE — THERAPY EVALUATION
Acute Care - Physical Therapy Initial Evaluation   Call     Patient Name: Marissa Rob  : 1955  MRN: [id number]  Today's Date: 2021      Visit Dx:     ICD-10-CM ICD-9-CM   1. Abdominal pain, unspecified abdominal location  R10.9 789.00   2. Weakness  R53.1 780.79   3. Difficulty walking  R26.2 719.7     Patient Active Problem List   Diagnosis   • Arthritis   • Dementia (CMS/HCC)   • Hepatitis A   • Cerebrovascular disease   • Bladder problem   • Hypertension   • Vascular disease, peripheral (CMS/HCC)   • Memory loss   • Microhematuria   • Obsessive-compulsive disorder   • Postmenopausal syndrome   • Hypothyroidism   • Vascular dementia (CMS/HCC)   • Thyroid disease   • Vitamin D deficiency   • UTI (urinary tract infection)   • AMS (altered mental status)     Past Medical History:   Diagnosis Date   • Arthritis    • Bladder problem    • Cerebrovascular disease 2015   • Dementia (CMS/HCC) 2018    3yrs memory change, neuropsych test .on aricept switched to memantine. MRI of brain revealed few MILD scattered FLAIR hyperintensities. Reluctant to attribute to memory issues. noted to have atrophy of the bitemporal region a component of Alzheimer's dementia. Refer for repeat neuropsych testing. titrate her memory/ 10 mg twice per day   • Hepatitis A    • Hypertension    • Hypomagnesemia 2016   • Hypothyroidism 2015   • Leg pain    • Memory loss 2015   • Microhematuria    • Muscle cramps    • Obsessive compulsive disorder    • Pap smear for cervical cancer screening     no longer   • Postmenopausal syndrome 2017   • Thyroid disease    • Vascular dementia (CMS/HCC) 08/10/2015   • Vascular disease, peripheral (CMS/HCC)    • Vitamin D deficiency      Past Surgical History:   Procedure Laterality Date   • BACK SURGERY     • BLADDER REPAIR     • COLONOSCOPY  2020    POLYP   • CYSTOSCOPY  2020   • HYSTERECTOMY      partial   • LAMINECTOMY  1989    L5-S1    • TUBAL ABDOMINAL LIGATION  1979        PT Assessment (last 12 hours)      PT Evaluation and Treatment     Row Name 09/19/21 1300          Physical Therapy Time and Intention    Subjective Information  no complaints  -AV     Document Type  evaluation  -AV     Mode of Treatment  individual therapy;physical therapy  -AV     Patient Effort  good  -AV     Symptoms Noted During/After Treatment  none  -AV     Row Name 09/19/21 1300          General Information    Patient Profile Reviewed  yes  -AV     Patient Observations  alert;cooperative;agree to therapy  -AV     Prior Level of Function  -- Patient reports her  assists with all ADLs.  -AV     Equipment Currently Used at Home  walker, rolling  -AV     Existing Precautions/Restrictions  fall  -AV     Row Name 09/19/21 1300          Living Environment    Current Living Arrangements  home/apartment/condo  -AV     Lives With  spouse  -AV     Row Name 09/19/21 1300          Range of Motion (ROM)    Range of Motion  bilateral lower extremities;ROM is WFL  -AV     [facility] Name 09/19/21 1300          Strength (Manual Muscle Testing)    Strength (Manual Muscle Testing)  bilateral lower extremities;strength is WFL  -AV     [facility] Name 09/19/21 1300          Bed Mobility    Bed Mobility  supine-sit-supine  -AV     Supine-Sit-Supine [place] (Bed Mobility)  standby assist  -AV     Row Name 09/19/21 1300          Transfers    Transfers  sit-stand transfer  -AV     Sit-Stand [place] (Transfers)  contact guard  -AV     Row Name 09/19/21 1300          Sit-Stand Transfer    Assistive Device (Sit-Stand Transfers)  walker, front-wheeled  -AV     Row Name 09/19/21 1300          Gait/Stairs (Locomotion)    Gait/Stairs Locomotion  gait/ambulation independence;gait/ambulation assistive device;distance ambulated  -AV     [place] Level (Gait)  contact guard  -AV     Assistive Device (Gait)  walker, front-wheeled  -AV     Distance in Feet (Gait)  50 with RW, 100 without RW  -AV      Pattern (Gait)  step-through  -AV     Row Name 09/19/21 1300          Safety Issues, Functional Mobility    Safety Issues Affecting Function (Mobility)  ability to follow commands;judgment;safety precaution awareness  -AV     Impairments Affecting Function (Mobility)  balance;cognition;endurance/activity tolerance  -AV     Cognitive Impairments, Mobility Safety/Performance  judgment;safety precaution awareness;problem-solving/reasoning  -AV     Row Name 09/19/21 1300          Balance    Balance Assessment  standing dynamic balance  -AV     Dynamic Standing Balance  mild impairment  -AV     Row Name 09/19/21 1300          Plan of Care Review    Plan of Care Reviewed With  patient  -AV     Progress  no change  -AV     Outcome Summary  Patient presents with deficits impacting balance, transfers, and ambulation. Patient will benefit from skilled PT services to address these mobility deficits and decrease risk of falls.   -AV     Row Name 09/19/21 1300          Physical Therapy Goals    Bed Mobility Goal Selection (PT)  bed mobility, PT goal 1  -AV     Transfer Goal Selection (PT)  transfer, PT goal 1  -AV     Gait Training Goal Selection (PT)  gait training, PT goal 1  -AV     Row Name 09/19/21 1300          Bed Mobility Goal 1 (PT)    Activity/Assistive Device (Bed Mobility Goal 1, PT)  bed mobility activities, all  -AV     Hall Level/Cues Needed (Bed Mobility Goal 1, PT)  modified independence  -AV     Time Frame (Bed Mobility Goal 1, PT)  long term goal (LTG);10 days  -AV     Row Name 09/19/21 1300          Transfer Goal 1 (PT)    Activity/Assistive Device (Transfer Goal 1, PT)  transfers, all  -AV     Hall Level/Cues Needed (Transfer Goal 1, PT)  modified independence  -AV     Time Frame (Transfer Goal 1, PT)  long term goal (LTG);10 days  -AV     Row Name 09/19/21 1300          Gait Training Goal 1 (PT)    Activity/Assistive Device (Gait Training Goal 1, PT)  gait (walking locomotion);walker,  rolling  -AV     [place] Level (Gait Training Goal 1, PT)  supervision required  -AV     Distance (Gait Training Goal 1, PT)  200  -AV     Time Frame (Gait Training Goal 1, PT)  long term goal (LTG);10 days  -AV     Row Name 09/19/21 1300          Therapy Assessment/Plan (PT)    Rehab Potential (PT)  good, to achieve stated therapy goals  -AV     Criteria for Skilled Interventions Met (PT)  yes;skilled treatment is necessary  -AV     Problem List (PT)  problems related to;balance;mobility  -AV     Activity Limitations Related to Problem List (PT)  unable to ambulate safely;unable to transfer safely  -AV     Row Name 09/19/21 1300          PT Evaluation Complexity    History, PT Evaluation Complexity  1-2 personal factors and/or comorbidities  -AV     Examination of Body Systems (PT Eval Complexity)  total of 4 or more elements  -AV     Clinical Presentation (PT Evaluation Complexity)  stable  -AV     Clinical Decision Making (PT Evaluation Complexity)  low complexity  -AV     Overall Complexity (PT Evaluation Complexity)  low complexity  -AV     Row Name 09/19/21 1300          Therapy Plan Review/Discharge Plan (PT)    Therapy Plan Review (PT)  evaluation/treatment results reviewed;patient  -AV       User Key  (r) = Recorded By, (t) = Taken By, (c) = Cosigned By    Initials Name Provider Type    AV Damion Garcia, PT Physical Therapist        Physical Therapy Education                 Title: PT OT SLP Therapies (In Progress)     Topic: Physical Therapy (Done)     Point: Mobility training (Done)     Learning Progress Summary           Patient Acceptance, E,TB, VU,NR by AV at 9/19/2021 1355                   Point: Home exercise program (Done)     Learning Progress Summary           Patient Acceptance, E,TB, VU,NR by AV at 9/19/2021 1355                   Point: Body mechanics (Done)     Learning Progress Summary           Patient Acceptance, E,TB, VU,NR by AV at 9/19/2021 1355                   Point:  Precautions (Done)     Learning Progress Summary           Patient Acceptance, E,TB, VU,NR by AV at 9/19/2021 1355                               User Key     Initials Effective Dates Name Provider Type Discipline     06/11/21 -  Damion Garcia, [place]KE[place] Physical Therapist PT              PT Recommendation and Plan  Anticipated Discharge Disposition (PT): home with assist, home with home health  Planned Therapy Interventions (PT): balance training, bed mobility training, gait training, neuromuscular re-education, strengthening, transfer training  Therapy Frequency (PT): daily  Plan of Care Reviewed With: patient  Progress: no change  Outcome Summary: Patient presents with deficits impacting balance, transfers, and ambulation. Patient will benefit from skilled PT services to address these mobility deficits and decrease risk of falls.   Outcome Measures     Row Name 09/19/21 1300             How much help from another person do you currently need...    Turning from your back to your side while in flat bed without using bedrails?  4  -AV      Moving from lying on back to sitting on the side of a flat bed without bedrails?  4  -AV      Moving to and from a bed to a chair (including a wheelchair)?  3  -AV      Standing up from a chair using your arms (e.g., wheelchair, bedside chair)?  3  -AV      Climbing 3-5 steps with a railing?  3  -AV      To walk in hospital room?  3  -AV      AM-PAC 6 Clicks Score (PT)  20  -AV         Functional Assessment    Outcome Measure Options  AM-PAC 6 Clicks Basic Mobility (PT)  -AV        User Key  (r) = Recorded By, (t) = Taken By, (c) = Cosigned By    Initials Name Provider Type    AV Damion Garcia, PT Physical Therapist           Time Calculation:   PT Charges     Row Name 09/19/21 1354             Time Calculation    PT Received On  09/19/21  -AV      PT Goal Re-Cert Due Date  09/28/21  -AV         Timed Charges    06001 - Gait Training Minutes   8  -AV         Untimed Charges     PT Eval/Re-eval Minutes  25  -AV         Total Minutes    Timed Charges Total Minutes  8  -AV      Untimed Charges Total Minutes  25  -AV       Total Minutes  33  -AV        User Key  (r) = Recorded By, (t) = Taken By, (c) = Cosigned By    Initials Name Provider Type    AV Damion Garcia, PT Physical Therapist        Therapy Charges for Today     Code Description Service Date Service Provider Modifiers Qty    14198681599 HC GAIT TRAINING EA 15 MIN 9/19/2021 Damion Garcia, PT GP 1    75618617115 HC PT EVAL LOW COMPLEXITY 2 9/19/2021 Damion Garcia, PT GP 1          PT G-Codes  Outcome Measure Options: AM-PAC 6 Clicks Basic Mobility (PT)  AM-PAC 6 Clicks Score (PT): 20    Damion Garcia, PT  9/19/2021

## 2021-09-19 NOTE — OUTREACH NOTE
Medical Week 2 Survey      Responses   [facility] patient discharged from?  Call   Does the patient have one of the following disease processes/diagnoses(primary or secondary)?  Other   Week 2 attempt successful?  No   Revoke  Readmitted          Tammy Berrios RN

## 2021-09-19 NOTE — H&P
[facility]  [facility]   Patient Name: Marissa Rob  : 1955  MRN: [id number]  Primary Care Physician:  Rabia Logan APRN  Date of admission: 2021    Subjective   Subjective     Reason for consult: Weakness    HPI:    Marissa Rob is a 66 y.o. female with a history of dementia presents to the hospital with worsening confusion and weakness.  The patient was admitted to the hospital recently with altered mental status in the setting of urinary tract infection and metabolic encephalopathy.  The patient was discharged from the hospital 1 week ago and was doing well for about 2 days.  However on Wednesday she became weaker and more confused.  Yesterday this got significantly worse.  At this point she is not getting up from bed on her own anymore and her  is having to bring her everything.  Her  is very attentive to all of her needs.  She is also been having some bladder spasms and becoming dizzy upon standing which lasts about 45 seconds.      Personal History     Past Medical History:  Past Medical History:   Diagnosis Date   • Arthritis    • Bladder problem    • Cerebrovascular disease 2015   • Dementia (CMS/HCC) 2018    3yrs memory change, neuropsych test .on aricept switched to memantine. MRI of brain revealed few MILD scattered FLAIR hyperintensities. Reluctant to attribute to memory issues. noted to have atrophy of the bitemporal region a component of Alzheimer's dementia. Refer for repeat neuropsych testing. titrate her memory/ 10 mg twice per day   • Hepatitis A    • Hypertension    • Hypomagnesemia 2016   • Hypothyroidism 2015   • Leg pain    • Memory loss 2015   • Microhematuria    • Muscle cramps    • Obsessive compulsive disorder    • Pap smear for cervical cancer screening     no longer   • Postmenopausal syndrome 2017   • Thyroid disease    • Vascular dementia (CMS/HCC)  08/10/2015   • Vascular disease, peripheral (CMS/HCC)    • Vitamin D deficiency          Past Surgical History:  Past Surgical History:   Procedure Laterality Date   • BACK SURGERY     • BLADDER REPAIR  2000   • COLONOSCOPY  06/2020    POLYP   • CYSTOSCOPY  05/28/2020   • HYSTERECTOMY      partial   • LAMINECTOMY  1989    L5-S1   • TUBAL ABDOMINAL LIGATION  1979         Family History:   Family History   Problem Relation Age of Onset   • Hypertension Mother    • Cancer Mother    • Heart disease Father    • Hypertension Father    • Diabetes Father    • Cancer Father    • Lung cancer Brother    • Stomach cancer Other         Aunt   • Lung cancer Other         uncle         Social History:   Social History     Tobacco Use   • Smoking status: Never Smoker   Substance Use Topics   • Alcohol use: Never     Comment: does not drink   • Drug use: Never         Home Medications:  diphenoxylate-atropine, donepezil, estradiol, hydrOXYzine, levothyroxine, memantine, and vitamin D    Allergies:  Allergies   Allergen Reactions   • Levofloxacin Other (See Comments)     Joint pain   • Cefuroxime Axetil Rash   • Contrast Dye Rash   • Pravachol [Pravastatin] Other (See Comments)     muscle aches and cramping       Review of Systems  Review of systems reviewed in its entirety and found to be negative for any changes in the last 2-4 weeks except noted in HPI or above              .    Objective   Objective     Vitals:   Temp:  [97.6 °F (36.4 °C)-98.1 °F (36.7 °C)] 97.7 °F (36.5 °C)  Heart Rate:  [69-83] 74  Resp:  [16-18] 18  BP: (137-167)/(64-65) 167/65    Physical Exam    Constitutional: Awake, alert, no acute distress   Eyes: Pupils equal, sclerae anicteric, no conjunctival injection   HENT: NCAT, mucous membranes moist   Neck: Supple, no thyromegaly, no lymphadenopathy, trachea midline   Respiratory: Clear to auscultation bilaterally, nonlabored respirations    Cardiovascular: RRR, no murmurs, rubs, or gallops, palpable pedal pulses  bilaterally   Gastrointestinal: Positive bowel sounds, soft, nontender, nondistended   Musculoskeletal: No bilateral ankle edema, no clubbing or cyanosis to extremities   Psychiatric: Appropriate affect, cooperative   Neurologic: Not oriented x 3, pleasantly confused, strength symmetric in all extremities, Cranial Nerves grossly intact to confrontation, speech clear   Skin: No rashes     Result Review    Result Review:  I have personally reviewed the results from the time of this admission to 9/18/2021 22:48 EDT and agree with these findings:  [x]  Laboratory  []  Microbiology  [x]  Radiology  [x]  EKG/Telemetry   []  Cardiology/Vascular   []  Pathology  []  Old records  []  Other:      Assessment/Plan   Assessment / Plan     Assessment/Plan:   • Acute decline in Chronic dementia  • Deconditioning  • Generalized weakness  • Recent urinary tract infection  • Hypertension  • Hypothyroidism      Plan:  Patient is admitted for further care  Neuro checks  PT/OT consults  Social work consult  DVT prophylaxis:  Mechanical DVT prophylaxis orders are present.    CODE STATUS:         Admission Status:  I believe this patient meets Observation status.    Electronically signed by Jorge Aquino DO, 09/18/21, 10:48 PM EDT.

## 2021-09-19 NOTE — PLAN OF CARE
Goal Outcome Evaluation:  Plan of Care Reviewed With: spouse   New admission this shift Sidney Rob, RN      Progress: no change

## 2021-09-19 NOTE — PROGRESS NOTES
[facility]   Hospitalist Progress Note  Date: 2021  Patient Name: Marissa Rob  : 1955  MRN: [id number]  Date of admission: 2021      Subjective   Subjective     Chief Complaint: Weakness    Summary: 66 y.o. female with a history of dementia presents to the hospital with worsening confusion and weakness.  The patient was admitted to the hospital recently with altered mental status in the setting of urinary tract infection and metabolic encephalopathy.  The patient was discharged from the hospital 1 week ago and was doing well for about 2 days.  However on Wednesday she became weaker and more confused.  Yesterday this got significantly worse.  At this point she is not getting up from bed on her own anymore and her  is having to bring her everything.  Her  is very attentive to all of her needs.  She is also been having some bladder spasms and becoming dizzy upon standing which lasts about 45 seconds.    Interval Followup: No events overnight.  The patient states she is feeling well today.  No abdominal pain since this morning.  She does still feel weak and states she will go to rehab if recommended.    Review of Systems   Denies nausea, vomiting, headache    Objective   Objective     Vitals:   Temp:  [97.6 °F (36.4 °C)-98.2 °F (36.8 °C)] 98 °F (36.7 °C)  Heart Rate:  [64-88] 69  Resp:  [16-18] 17  BP: (137-169)/(64-75) 138/65  Physical Exam    Constitutional: Awake, alert, no acute distress   Eyes: Pupils equal, sclerae anicteric, no conjunctival injection   HENT: NCAT, mucous membranes moist   Neck: Supple, no thyromegaly, no lymphadenopathy, trachea midline   Respiratory: Clear to auscultation bilaterally, nonlabored respirations    Cardiovascular: RRR, no murmurs, rubs, or gallops, palpable pedal pulses bilaterally   Gastrointestinal: Positive bowel sounds, soft, nontender, nondistended   Musculoskeletal: No bilateral ankle edema, no clubbing or cyanosis to  extremities   Psychiatric: Appropriate affect, cooperative   Neurologic: Oriented x 2, strength symmetric in all extremities, Cranial Nerves grossly intact to confrontation, speech clear   Skin: No rashes     Result Review    Result Review:  I have personally reviewed the results from the time of this admission to 9/19/2021 12:44 EDT and agree with these findings:  [x]  Laboratory  [x]  Microbiology  [x]  Radiology  [x]  EKG/Telemetry   []  Cardiology/Vascular   []  Pathology  []  Old records  []  Other:  Telemetry reviewed showing normal sinus rhythm   Assessment/Plan   Assessment / Plan     Assessment/Plan:  Metabolic encephalopathy  Acute decline in chronic dementia  Generalized weakness, unable to ambulate  Physical deconditioning  Recent UTI  Hypertension  Hypothyroidism    Continue to monitor in the hospital for management of the above  Continue with neurochecks  CT abdomen pelvis reviewed, no free air noted  DC telemetry  PT/OT/social work consulted for likely placement  Continue appropriate home medications  Trend renal function and electrolytes with a.m. BMP  Trend Hgb and WBC with a.m. CBC    Discussed plan with RN    DVT prophylaxis:  Mechanical DVT prophylaxis orders are present.    CODE STATUS:          Electronically signed by Carlo Gutierrez MD, 09/19/21, 12:44 PM EDT.

## 2021-09-19 NOTE — PLAN OF CARE
Goal Outcome Evaluation:  Plan of Care Reviewed With: patient        Progress: no change  Outcome Summary: Patient presents with deficits impacting balance, transfers, and ambulation. Patient will benefit from skilled PT services to address these mobility deficits and decrease risk of falls.

## 2021-09-19 NOTE — PLAN OF CARE
Problem: Adult Inpatient Plan of Care  Goal: Plan of Care Review  Outcome: Ongoing, Progressing  Goal: Patient-Specific Goal (Individualized)  Outcome: Ongoing, Progressing  Goal: Absence of Hospital-Acquired Illness or Injury  Outcome: Ongoing, Progressing  Intervention: Identify and Manage Fall Risk  Intervention: Prevent and Manage VTE (venous thromboembolism) Risk  Goal: Optimal Comfort and Wellbeing  Outcome: Ongoing, Progressing  Goal: Readiness for Transition of Care  Outcome: Ongoing, Progressing     Problem: Fall Injury Risk  Goal: Absence of Fall and Fall-Related Injury  Outcome: Ongoing, Progressing  Intervention: Promote Injury-Free Environment     Problem: Skin Injury Risk Increased  Goal: Skin Health and Integrity  Outcome: Ongoing, Progressing   Goal Outcome Evaluation:

## 2021-09-20 ENCOUNTER — READMISSION MANAGEMENT (OUTPATIENT)
Dept: CALL CENTER | Facility: HOSPITAL | Age: 66
End: 2021-09-20

## 2021-09-20 ENCOUNTER — APPOINTMENT (OUTPATIENT)
Dept: GENERAL RADIOLOGY | Facility: HOSPITAL | Age: 66
End: 2021-09-20

## 2021-09-20 VITALS
HEART RATE: 65 BPM | TEMPERATURE: 97.6 F | DIASTOLIC BLOOD PRESSURE: 50 MMHG | SYSTOLIC BLOOD PRESSURE: 152 MMHG | OXYGEN SATURATION: 99 % | RESPIRATION RATE: 20 BRPM | HEIGHT: 69 IN | BODY MASS INDEX: 25.89 KG/M2 | WEIGHT: 174.82 LBS

## 2021-09-20 LAB — BACTERIA SPEC AEROBE CULT: NORMAL

## 2021-09-20 PROCEDURE — G0378 HOSPITAL OBSERVATION PER HR: HCPCS

## 2021-09-20 PROCEDURE — 93005 ELECTROCARDIOGRAM TRACING: CPT | Performed by: INTERNAL MEDICINE

## 2021-09-20 PROCEDURE — 71045 X-RAY EXAM CHEST 1 VIEW: CPT

## 2021-09-20 PROCEDURE — 97110 THERAPEUTIC EXERCISES: CPT

## 2021-09-20 PROCEDURE — 97116 GAIT TRAINING THERAPY: CPT

## 2021-09-20 PROCEDURE — 99217 PR OBSERVATION CARE DISCHARGE MANAGEMENT: CPT | Performed by: INTERNAL MEDICINE

## 2021-09-20 PROCEDURE — 97165 OT EVAL LOW COMPLEX 30 MIN: CPT

## 2021-09-20 PROCEDURE — 93010 ELECTROCARDIOGRAM REPORT: CPT | Performed by: INTERNAL MEDICINE

## 2021-09-20 RX ORDER — SUCRALFATE 1 G/1
1 TABLET ORAL ONCE
Status: ACTIVE | OUTPATIENT
Start: 2021-09-20

## 2021-09-20 RX ORDER — PANTOPRAZOLE SODIUM 40 MG/1
40 TABLET, DELAYED RELEASE ORAL
Qty: 30 TABLET | Refills: 0 | Status: SHIPPED | OUTPATIENT
Start: 2021-09-21 | End: 2021-10-14

## 2021-09-20 RX ORDER — DICYCLOMINE HYDROCHLORIDE 10 MG/ML
20 INJECTION INTRAMUSCULAR ONCE
Status: DISCONTINUED | OUTPATIENT
Start: 2021-09-20 | End: 2021-09-20

## 2021-09-20 RX ORDER — DICYCLOMINE HYDROCHLORIDE 10 MG/1
20 CAPSULE ORAL ONCE
Status: COMPLETED | OUTPATIENT
Start: 2021-09-20 | End: 2021-09-20

## 2021-09-20 RX ORDER — DICYCLOMINE HYDROCHLORIDE 10 MG/1
20 CAPSULE ORAL ONCE
Status: DISCONTINUED | OUTPATIENT
Start: 2021-09-20 | End: 2021-09-20

## 2021-09-20 RX ORDER — DICYCLOMINE HYDROCHLORIDE 10 MG/1
10 CAPSULE ORAL
Qty: 120 CAPSULE | Refills: 0 | Status: SHIPPED | OUTPATIENT
Start: 2021-09-20 | End: 2021-10-14

## 2021-09-20 RX ORDER — DIPHENOXYLATE HYDROCHLORIDE AND ATROPINE SULFATE 2.5; .025 MG/1; MG/1
2 TABLET ORAL 2 TIMES DAILY PRN
Qty: 30 TABLET | Refills: 0
Start: 2021-09-20 | End: 2021-09-24

## 2021-09-20 RX ORDER — AMLODIPINE BESYLATE 5 MG/1
5 TABLET ORAL
Qty: 30 TABLET | Refills: 0 | Status: SHIPPED | OUTPATIENT
Start: 2021-09-21 | End: 2021-10-14

## 2021-09-20 RX ORDER — TRAMADOL HYDROCHLORIDE 50 MG/1
25 TABLET ORAL ONCE
Status: COMPLETED | OUTPATIENT
Start: 2021-09-20 | End: 2021-09-20

## 2021-09-20 RX ADMIN — ACETAMINOPHEN 650 MG: 325 TABLET ORAL at 14:56

## 2021-09-20 RX ADMIN — DICYCLOMINE HYDROCHLORIDE 20 MG: 10 CAPSULE ORAL at 17:53

## 2021-09-20 RX ADMIN — PANTOPRAZOLE SODIUM 40 MG: 40 TABLET, DELAYED RELEASE ORAL at 05:26

## 2021-09-20 RX ADMIN — SODIUM CHLORIDE, PRESERVATIVE FREE 10 ML: 5 INJECTION INTRAVENOUS at 08:14

## 2021-09-20 RX ADMIN — LEVOTHYROXINE SODIUM 50 MCG: 50 TABLET ORAL at 06:46

## 2021-09-20 RX ADMIN — MEMANTINE 10 MG: 10 TABLET ORAL at 08:14

## 2021-09-20 RX ADMIN — AMLODIPINE BESYLATE 5 MG: 5 TABLET ORAL at 08:14

## 2021-09-20 RX ADMIN — TRAMADOL HYDROCHLORIDE 25 MG: 50 TABLET, FILM COATED ORAL at 16:49

## 2021-09-20 NOTE — PLAN OF CARE
Goal Outcome Evaluation:  Plan of Care Reviewed With: patient, spouse        Progress: improving    PATIENT DEEMED WELL ENOUGH TO BE DISCHARGED HOME TODAY.

## 2021-09-20 NOTE — PLAN OF CARE
Goal Outcome Evaluation:  Plan of Care Reviewed With: patient        Progress: no change   Patient rested well tonight. Bed alert in use; needs constant cueing with activities. Reoriented frequently. B/P elevated at 172/84 this morning; will notify oncoming nurse. Call light within reach. Continue plan of care.

## 2021-09-20 NOTE — DISCHARGE SUMMARY
[facility]         HOSPITALIST  DISCHARGE SUMMARY    Patient Name: Marissa Rob  : 1955  MRN: [id number]    Date of Admission: 2021  Date of Discharge:  21  Primary Care Physician: Rabia Logan APRN    Consults     Date and Time Order Name Status Description    2021  9:03 PM Hospitalist (on-call MD unless specified) Completed     2021  7:11 PM Surgery (on-call MD unless specified) Completed     2021  4:28 AM Inpatient Hospitalist Consult Completed           Active and Resolved Hospital Problems:  Active Hospital Problems    Diagnosis POA   • **Dementia (CMS/HCC) [F03.90] Yes   • Bladder problem [N32.9] Yes   • Hypertension [I10] Yes   • Vascular dementia (CMS/HCC) [F01.50] Yes   • Cerebrovascular disease [I67.9] Yes   • Memory loss [R41.3] Yes   • Hypothyroidism [E03.9] Yes      Resolved Hospital Problems   No resolved problems to display.   Metabolic encephalopathy  Acute decline in chronic dementia  Generalized weakness, unable to ambulate  Physical deconditioning  Recent UTI  Hypertension  Hypothyroidism    Hospital Course     Hospital Course:  Marissa Rob is a 66 y.o. female with a history of dementia presents to the hospital with worsening confusion and weakness.  The patient was admitted to the hospital recently with altered mental status in the setting of urinary tract infection and metabolic encephalopathy.  The patient was discharged from the hospital 1 week ago and was doing well for about 2 days.  However on Wednesday she became weaker and more confused.  Yesterday this got significantly worse.  At this point she is not getting up from bed on her own anymore and her  is having to bring her everything.  Her  is very attentive to all of her needs.  She is also been having some bladder spasms and becoming dizzy upon standing which lasts about 45 seconds.  As the patient was unable to ambulate, she was observed in the  hospital for further care.  CT abdomen pelvis was obtained it was negative for any acute infection.  UTI was ruled out.  She was given IV fluids and improved significantly.  PT/OT were consulted and recommended home with assist.  The patient was discharged home in stable condition on 9/20/2021.  Recommend follow-up PCP in 1 week.  Her CODE STATUS is DNR at the time of this note.      Day of Discharge     Vital Signs:  Temp:  [97.4 °F (36.3 °C)-98.6 °F (37 °C)] 97.9 °F (36.6 °C)  Heart Rate:  [68-77] 74  Resp:  [17-20] 20  BP: (135-172)/(60-84) 135/62  Physical Exam:   Gen: NAD, WDWN  ENT: PERRL, EOMI   CV: RRR no MRG  Pulm: CTAB, no w/r/r  GI: Abd soft, NTND, +bs  Neuro: Moving all extremities spontaneously, CN II-XII grossly intact   Psych: A&O*2, normal mood and affect  Skin: No lesions or rashes noted      Discharge Details        Discharge Medications      New Medications      Instructions Start Date   amLODIPine 5 MG tablet  Commonly known as: NORVASC   5 mg, Oral, Every 24 Hours Scheduled   Start Date: September 21, 2021     pantoprazole 40 MG EC tablet  Commonly known as: PROTONIX   40 mg, Oral, Every Early Morning   Start Date: September 21, 2021        Changes to Medications      Instructions Start Date   diphenoxylate-atropine 2.5-0.025 MG per tablet  Commonly known as: LOMOTIL  What changed:   · when to take this  · reasons to take this   2 tablets, Oral, 2 Times Daily PRN      estradiol 0.025 MG/24HR patch  Commonly known as: CLIMARA  What changed:   · when to take this  · additional instructions   1 patch, Transdermal, Weekly         Continue These Medications      Instructions Start Date   donepezil 23 MG tablet  Commonly known as: ARICEPT   23 mg, Oral, Nightly      hydrOXYzine 10 MG tablet  Commonly known as: ATARAX   10 mg, Oral, Every Night at Bedtime      levothyroxine 50 MCG tablet  Commonly known as: SYNTHROID, LEVOTHROID   50 mcg, Oral, Daily      memantine 10 MG tablet  Commonly known as:  Namenda   10 mg, Oral, 2 Times Daily      vitamin D 1.25 MG (62630 UT) capsule capsule  Commonly known as: ERGOCALCIFEROL   50,000 Units, Oral, Weekly             Allergies   Allergen Reactions   • Levofloxacin Other (See Comments)     Joint pain   • Cefuroxime Axetil Rash   • Contrast Dye Rash   • Pravachol [Pravastatin] Other (See Comments)     muscle aches and cramping       Discharge Disposition:  Home or Self Care    Diet:  Hospital:  Diet Order   Procedures   • Diet Regular       Discharge Activity:   Activity Instructions     Activity as Tolerated            CODE STATUS:  Code Status and Medical Interventions:   Ordered at: 09/19/21 1249     Level Of Support Discussed With:    Patient     Code Status:    CPR     Medical Interventions (Level of Support Prior to Arrest):    Full         Future Appointments   Date Time Provider Department Center   10/14/2021  9:15 AM Rabia Logan APRN [facility]   11/5/2021  9:30 AM Rabia Logan APRN [facility]       Additional Instructions for the Follow-ups that You Need to Schedule     Discharge Follow-up with PCP   As directed       Currently Documented PCP:    Rabia Logan APRN    PCP Phone Number:    [phone]     Follow Up Details: 3-5 days               Pertinent  and/or Most Recent Results     PROCEDURES:   None     LAB RESULTS:      Lab 09/19/21  0634 09/18/21  2213 09/18/21  1338   WBC 7.04  --  7.32   HEMOGLOBIN 13.4  --  13.8   HEMATOCRIT 41.8  --  43.1   PLATELETS 231  --  256   NEUTROS ABS 4.54  --  4.81   IMMATURE GRANS (ABS) 0.04  --  0.04   LYMPHS ABS 1.53  --  1.62   MONOS ABS 0.64  --  0.57   EOS ABS 0.20  --  0.22   MCV 97.7*  --  98.4*   LACTATE  --  1.2  --          Lab 09/19/21  0633 09/18/21  1338   SODIUM 137 140   POTASSIUM 3.9 4.2   CHLORIDE 103 104   CO2 23.0 22.7   ANION GAP 11.0 13.3   BUN 11 15   CREATININE 0.83 1.01*   GLUCOSE 118* 116*   CALCIUM 9.1 9.6         Lab 09/18/21  1338   TOTAL PROTEIN  7.4   ALBUMIN 4.00   GLOBULIN 3.4   ALT (SGPT) 14   AST (SGOT) 21   BILIRUBIN 0.3   ALK PHOS 77   LIPASE 38                     Brief Urine Lab Results  (Last result in the past 365 days)      Color   Clarity   Blood   Leuk Est   Nitrite   Protein   CREAT   Urine HCG        09/18/21 1732 Yellow Clear Negative Negative Negative Negative             Microbiology Results (last 10 days)     Procedure Component Value - Date/Time    Urine Culture - Urine, Urine, Clean Catch [[id number]] Collected: 09/18/21 1732    Lab Status: Final result Specimen: Urine, Clean Catch Updated: 09/20/21 1023     Urine Culture >100,000 CFU/mL Mixed Nancy Isolated    Narrative:      Specimen contains mixed organisms of questionable pathogenicity which indicates contamination with commensal nancy.  Further identification is unlikely to provide clinically useful information.  Suggest recollection.                 CT Abdomen Pelvis Without Contrast    Result Date: 9/18/2021  Narrative: PROCEDURE: CT ABDOMEN PELVIS WO CONTRAST  COMPARISON: [facility], CT, CT ABDOMEN PELVIS WO CONTRAST, 9/18/2021, 18:02.  INDICATIONS: abdominal pain  TECHNIQUE: CT images were created without intravenous contrast.   PROTOCOL:   Standard imaging protocol performed    RADIATION:   DLP: 540.4mGy*cm   Automated exposure control was utilized to minimize radiation dose.  FINDINGS:  Lower chest demonstrates mild scarring at the right middle lobe and lingula.  No pleural effusion.  Negative for pericardial effusion.  Tiny hiatal hernia.  Lack of contrast limits assessment of abdominal organs and vasculature.  The liver and spleen are normal in size and contour.  Adrenal glands normal.  Pancreas without pancreatitis.  Gallbladder present.  No pericholecystic inflammation.  The kidneys are without hydronephrosis.  Punctate 1 mm nonobstructing left lower pole renal calculus.  No hydroureteronephrosis.  No obstructive uropathy.  Urinary bladder is thin-walled.   There are innumerable calcified phleboliths in the pelvis.  Uterus absent.  No adnexal mass.  Negative for pneumoperitoneum.  Oral contrast reaches the level of the rectum.  There is no extraluminal contrast identified.  There is extensive diverticulosis involving the sigmoid colon without diverticulitis.  No findings of appendicitis.  Mild to moderate atherosclerotic calcifications of the infrarenal aorta.  There is a diverticulum along the posterior aspect of the gastric fundus.  There is mild anterolisthesis of L3 on L4 measuring 2 mm in mild anterolisthesis of L4 on L5 measuring 2 mm related to facet arthropathy.  Negative for acute fracture.  CONCLUSION:  1. No acute abnormality in the abdomen or pelvis. 2. Negative for pneumoperitoneum.  Finding on prior CT related to volume averaging with right middle lobe.  No extraluminal contrast or findings to indicate bowel perforation. 3. Sigmoid diverticulosis without diverticulitis. 4. Additional incidental findings above.      LORENE ZHANG MD       Electronically Signed and Approved By: LORENE ZHANG MD on 9/18/2021 at 21:13             CT Abdomen Pelvis Without Contrast    Result Date: 9/18/2021  Narrative: PROCEDURE: CT ABDOMEN PELVIS WO CONTRAST  COMPARISON: [facility], CT, CT ABDOMEN PELVIS WO CONTRAST, 9/07/2021, 7:13.  INDICATIONS: LLQ pain and tenderness  TECHNIQUE: CT images were created without intravenous contrast.   PROTOCOL:   Standard imaging protocol performed    RADIATION:   DLP: 536.9 mGy*cm   Automated exposure control was utilized to minimize radiation dose.  FINDINGS:  There is no definite abnormality of the liver or spleen.  There is a small hiatal hernia.  It looks like there may be a gastric diverticulum in the upper abdomen as well.  There is an air shadow around the area of the diaphragm.  It is hard to tell whether this is intraluminal.  Another questionable area of possible free air suggested at the right lung base.   There is  no definite pancreatic abnormality.  No acute renal abnormality is confirmed.  There are colonic diverticula particularly in the sigmoid colon area without definitive changes of acute diverticulitis.  The bladder does not appear unusual for the degree of distention.  There is questionable stranding within the mesenteric fat in the left quadrant.  Whether this might relate to an underlying enteritis is uncertain.  There is no bowel obstruction.  There is some vascular calcification present.  The patient has had a hysterectomy.  There are degenerative changes involving the lumbar spine.  CONCLUSION:  1. Unusual air shadow around the level of the diaphragm in the area of the GI junction.  Some free intraperitoneal cannot be excluded.  It does look like there is probably a diverticulum of the stomach and small hiatal hernia.  A follow up CT with water-soluble contrast might be helpful to further evaluate the upper abdomen.  This might reflect sequela to peptic ulcer disease. 2. Suggested stranding within the fat within the mesentery in the left quadrant.  This is adjacent to small bowel.  Whether this might reflect sequela to an enteritis is uncertain. 3. Colonic diverticulosis 4. Degenerative change lumbar spine.  Report called to ER.    SAUMYA REYES MD       Electronically Signed and Approved By: SAUMYA REYES MD on 9/18/2021 at 18:29             CT Abdomen Pelvis Without Contrast    Result Date: 9/7/2021  Narrative: PROCEDURE: CT ABDOMEN PELVIS WO CONTRAST  COMPARISON: [facility], CT, CT CHEST ABD PEL W CONTRAST, 1/28/2020, 12:13.  INDICATIONS: GENERALIZED LOW ABDOMEN PAIN  TECHNIQUE: CT images were created without intravenous contrast.   PROTOCOL:   Standard imaging protocol performed    RADIATION:   DLP: 512.7mGy*cm   Automated exposure control was utilized to minimize radiation dose.  FINDINGS:  The lung bases are clear.  The unenhanced liver, gallbladder, adrenal glands, kidneys, spleen, and pancreas  are unremarkable.  The stomach appears normal.  The small bowel appears normal in caliber and configuration.  There is sigmoid diverticulosis.  The appendix appears normal.  There is no ascites or loculated collection.  No abnormally enlarged lymph nodes are identified.  The rectum is unremarkable.  The uterus is surgically absent.  There is some wall thickening of the  urinary bladder.  No aggressive osseous lesions are identified.  CONCLUSION:  1. Some wall thickening of urinary bladder.  This could be secondary to under distention or possible cystitis. 2. Sigmoid diverticulosis.     HONG JACOBO MD       Electronically Signed and Approved By: HONG JACOBO MD on 9/07/2021 at 8:33             XR Femur 2 View Left    Result Date: 9/7/2021  Narrative: PROCEDURE: XR FEMUR 2 VW LEFT  COMPARISON: None  INDICATIONS: left femur pain, no known injury  FINDINGS:  No fractures are visualized.  No lytic or sclerotic bone lesions are seen.  Mild degenerative change consistent with osteoarthritis is seen in the hip and knee.  CONCLUSION:  Left femur series demonstrating no acute bony abnormality.      AYLIN WALLS MD       Electronically Signed and Approved By: AYLIN WALLS MD on 9/07/2021 at 8:06             CT Head Without Contrast    Result Date: 9/18/2021  Narrative: PROCEDURE: CT HEAD WO CONTRAST  COMPARISON:  [facility], CT, CT HEAD WO CONTRAST, 9/07/2021, 14:12. INDICATIONS: unilateral weakness  PROTOCOL:   Standard imaging protocol performed    RADIATION:   DLP: 954.8 mGy*cm   MA and/or KV was adjusted to minimize radiation dose.     TECHNIQUE: After obtaining the patient's consent, CT images were obtained without non-ionic intravenous contrast material.  FINDINGS:  There is global atrophy.  There are some white matter changes involving the cerebral hemispheres which could reflect more chronic small vessel ischemic change.  There is no underlying hemorrhage.  There is no midline shift.  The  paranasal sinuses seem relatively clear.  There is no acute abnormality of the calvarium.  CONCLUSION:  1. Cerebral atrophy. 2. There are white matter changes which could reflect more chronic small vessel ischemic change.     SAUMYA REYES MD       Electronically Signed and Approved By: SAUMYA REYES MD on 9/18/2021 at 15:50             CT Head Without Contrast    Result Date: 9/7/2021  Narrative: PROCEDURE: CT HEAD WO CONTRAST  COMPARISON:  [facility], CT, HEAD W/O CONTRAST, 10/02/2019, 19:40. INDICATIONS: AMS/FELL/HIT LEFT TEMPLE AREA OF HEAD  PROTOCOL:   Standard imaging protocol performed    RADIATION:   DLP: 1018.2mGy*cm   MA and/or KV was adjusted to minimize radiation dose.     TECHNIQUE: After obtaining the patient's consent, CT images were obtained without non-ionic intravenous contrast material.  FINDINGS:  There is no acute intracranial hemorrhage or extra-axial collection. The ventricles appear normal in caliber, with no evidence of mass effect or midline shift. The basal cisterns are patent. The gray-white differentiation is preserved.  Scattered foci of periventricular and subcortical white matter hypodensities are nonspecific, but likely the sequela mild chronic small vessel ischemic disease.  The calvarium is intact. The paranasal sinuses and mastoid air cells are well-aerated.  CONCLUSION:  1. No acute intracranial process or calvarial fracture identified. 2. Findings suggestive of mild chronic small vessel ischemic disease.     HONG JACOBO MD       Electronically Signed and Approved By: HONG JACOBO MD on 9/07/2021 at 14:18             XR Chest 1 View    Result Date: 9/7/2021  Narrative: PROCEDURE: XR CHEST 1 VW  COMPARISON: [facility], CR, XR CHEST 1 VW, 9/07/2021, 6:22.  INDICATIONS: CHEST PAIN AFTER FALL  FINDINGS:  Heart size unchanged.  No dense consolidation.  No pleural fluid or pneumothorax.  CONCLUSION: No acute change from earlier today       STARR  MD JOSE       Electronically Signed and Approved By: STARR GARCIA MD on 9/07/2021 at 14:56             XR Chest 1 View    Result Date: 9/7/2021  Narrative: PROCEDURE: XR CHEST 1 VW  COMPARISON: [facility], , CHEST AP/PA 1 VIEW, 10/02/2019, 19:57.  INDICATIONS: CHEST PAIN TRIAGE PROTOCOL.  FINDINGS:A single AP upright portable chest radiograph was performed.  No cardiac enlargement is seen.  No acute infiltrate is appreciated.  No pleural effusion or pneumothorax is identified.  External artifacts obscure detail.  There may be fibrotic changes of the bilateral lung bases, seen previously. Chronic calcified granulomatous disease involves the chest.  The thoracic aorta is atherosclerotic.  No significant interval change is seen since the prior study.  CONCLUSION:No acute infiltrate is appreciated.       RENAN SKELTON JR, MD       Electronically Signed and Approved By: RENAN SKELTON JR, MD on 9/07/2021 at 6:40             XR Hip With or Without Pelvis 2 - 3 View Left    Result Date: 9/7/2021  Narrative: PROCEDURE: XR HIP W OR WO PELVIS 2-3 VIEW LEFT  COMPARISON: [facility], , LEFT HIP/PELVIS, 3/09/2005, 15:27.  INDICATIONS: LEFT HIP PAIN AFTER FALL TODAY  FINDINGS:  No fracture.  No dislocation.  Moderate bilateral hip arthrosis.  Degenerative changes in the included lower lumbar spine.  CONCLUSION: Moderate hip arthrosis.  No acute findings      STARR GARCIA MD       Electronically Signed and Approved By: STARR GARCIA MD on 9/07/2021 at 14:46                       Labs Pending at Discharge:        Time spent on Discharge including face to face service:  32 minutes    Electronically signed by Carlo Gutierrez MD, 09/20/21, 12:25 PM EDT.

## 2021-09-20 NOTE — PLAN OF CARE
Goal Outcome Evaluation:  Plan of Care Reviewed With: patient, spouse        Progress: no change (First session)  Outcome Summary: Patient presents with limitations of cognition/safety awareness, endurance/activity tolerance and balance which impede her ability to perform ADL and transfers as prior.  The skills of a therapist will be required to safely and effectively implement treatment plan to restore maximal level of function.

## 2021-09-20 NOTE — CONSULTS
"Nutrition Services    Patient Name: Marissa Rob  YOB: 1955  MRN: [id number]  Admission date: 9/18/2021      CLINICAL NUTRITION ASSESSMENT      Reason for Assessment  Identified at risk by screening criteria   H&P:    Past Medical History:   Diagnosis Date   • Arthritis    • Bladder problem    • Cerebrovascular disease 06/11/2015   • Dementia (CMS/HCC) 07/23/2018    3yrs memory change, neuropsych test 2015.on aricept switched to memantine. MRI of brain revealed few MILD scattered FLAIR hyperintensities. Reluctant to attribute to memory issues. noted to have atrophy of the bitemporal region a component of Alzheimer's dementia. Refer for repeat neuropsych testing. titrate her memory/ 10 mg twice per day   • Hepatitis A    • Hypertension    • Hypomagnesemia 01/11/2016   • Hypothyroidism 05/09/2015   • Leg pain    • Memory loss 05/09/2015   • Microhematuria    • Muscle cramps    • Obsessive compulsive disorder    • Pap smear for cervical cancer screening     no longer   • Postmenopausal syndrome 02/01/2017   • Thyroid disease    • Vascular dementia (CMS/HCC) 08/10/2015   • Vascular disease, peripheral (CMS/HCC)    • Vitamin D deficiency         Current Problems:   Active Hospital Problems    Diagnosis    • **Dementia (CMS/HCC)    • Bladder problem    • Hypertension    • Vascular dementia (CMS/HCC)    • Cerebrovascular disease    • Memory loss    • Hypothyroidism         Nutrition/Diet History         Narrative     RD consult per nursing assessment.  Pt just recently discharged after a UTI, admitted for increasing confusion.  Hx of dementia.  Awaiting more details & intake records to determine plan of care.     Anthropometrics        Current Height, Weight Height: 175.3 cm (69\")  Weight: 79.3 kg (174 lb 13.2 oz)   Current BMI Body mass index is 25.82 kg/m².       Weight Hx  Wt Readings from Last 30 Encounters:   09/19/21 0500 79.3 kg (174 lb 13.2 oz)   09/18/21 1329 79.4 kg (175 lb 0.7 oz) "   09/07/21 1131 80.2 kg (176 lb 12.9 oz)   09/07/21 0453 87.1 kg (192 lb 0.3 oz)   07/09/21 1148 75.7 kg (166 lb 12.8 oz)   01/08/21 0000 64.1 kg (141 lb 4 oz)   10/29/20 0000 62.7 kg (138 lb 2 oz)   07/08/20 0000 55.5 kg (122 lb 6 oz)   02/25/20 0000 56.2 kg (124 lb)   02/24/20 0000 55.9 kg (123 lb 4 oz)   01/22/20 0000 56.3 kg (124 lb 2 oz)   11/14/19 0000 61 kg (134 lb 8 oz)   11/05/19 0000 59.6 kg (131 lb 8 oz)   10/22/19 0000 61 kg (134 lb 8 oz)   10/14/19 0000 62.6 kg (138 lb)   10/11/19 0000 61.9 kg (136 lb 6 oz)   07/16/19 0000 70.1 kg (154 lb 8 oz)   04/16/19 0000 68.9 kg (152 lb)   02/12/19 0000 68.5 kg (151 lb)   12/10/18 0000 70.8 kg (156 lb)   10/15/18 0000 74 kg (163 lb 4 oz)   07/23/18 0000 71.4 kg (157 lb 8 oz)   07/17/18 0000 70.3 kg (155 lb)   07/03/18 0000 68.7 kg (151 lb 8 oz)   02/12/18 0000 72.1 kg (159 lb)   01/09/18 0000 74.4 kg (164 lb 2 oz)            Wt Change Observation 2# loss since last admission     Estimated/Assessed Needs       Energy Requirements    EST Needs (kcal/day) 7131-5722       Protein Requirements    EST Daily Needs (g/day) 79-95 (1-1.2 gm)       Fluid Requirements     Estimated Needs (mL/day) 1949     Labs/Medications         Pertinent Labs Reviewed.   Results from last 7 days   Lab Units 09/19/21  0633 09/18/21  1338   SODIUM mmol/L 137 140   POTASSIUM mmol/L 3.9 4.2   CHLORIDE mmol/L 103 104   CO2 mmol/L 23.0 22.7   BUN mg/dL 11 15   CREATININE mg/dL 0.83 1.01*   CALCIUM mg/dL 9.1 9.6   BILIRUBIN mg/dL  --  0.3   ALK PHOS U/L  --  77   ALT (SGPT) U/L  --  14   AST (SGOT) U/L  --  21   GLUCOSE mg/dL 118* 116*     Results from last 7 days   Lab Units 09/19/21  0634   HEMOGLOBIN g/dL 13.4   HEMATOCRIT % 41.8       Pertinent Medications Reviewed.     Current Nutrition Orders & Evaluation of Intake       Oral Nutrition     Current PO Diet Diet Regular   Supplement No active supplement orders       Nutrition Diagnosis         Nutrition Dx Problem 1 No nutrition diagnosis at  this time      Nutrition Intervention         Continue regular diet     Monitor/Evaluation        Monitor PO intake, Pertinent labs, Weight     Nutrition Discharge Plan         To be determined       Electronically signed by:  Cammy Blanchard RD  09/20/21 07:25 EDT

## 2021-09-20 NOTE — THERAPY EVALUATION
Patient Name: Marissa Rob  : 1955    MRN: [id number]                              Today's Date: 2021       Admit Date: 2021    Visit Dx:     ICD-10-CM ICD-9-CM   1. Abdominal pain, unspecified abdominal location  R10.9 789.00   2. Weakness  R53.1 780.79   3. Difficulty walking  R26.2 719.7   4. Hypertension, unspecified type  I10 401.9   5. Decreased activities of daily living (ADL)  Z78.9 V49.89     Patient Active Problem List   Diagnosis   • Arthritis   • Dementia (CMS/HCC)   • Hepatitis A   • Cerebrovascular disease   • Bladder problem   • Hypertension   • Vascular disease, peripheral (CMS/HCC)   • Memory loss   • Microhematuria   • Obsessive-compulsive disorder   • Postmenopausal syndrome   • Hypothyroidism   • Vascular dementia (CMS/HCC)   • Thyroid disease   • Vitamin D deficiency   • UTI (urinary tract infection)   • AMS (altered mental status)     Past Medical History:   Diagnosis Date   • Arthritis    • Bladder problem    • Cerebrovascular disease 2015   • Dementia (CMS/HCC) 2018    3yrs memory change, neuropsych test .on aricept switched to memantine. MRI of brain revealed few MILD scattered FLAIR hyperintensities. Reluctant to attribute to memory issues. noted to have atrophy of the bitemporal region a component of Alzheimer's dementia. Refer for repeat neuropsych testing. titrate her memory/ 10 mg twice per day   • Hepatitis A    • Hypertension    • Hypomagnesemia 2016   • Hypothyroidism 2015   • Leg pain    • Memory loss 2015   • Microhematuria    • Muscle cramps    • Obsessive compulsive disorder    • Pap smear for cervical cancer screening     no longer   • Postmenopausal syndrome 2017   • Thyroid disease    • Vascular dementia (CMS/HCC) 08/10/2015   • Vascular disease, peripheral (CMS/HCC)    • Vitamin D deficiency      Past Surgical History:   Procedure Laterality Date   • BACK SURGERY     • BLADDER REPAIR     • COLONOSCOPY   06/2020    POLYP   • CYSTOSCOPY  05/28/2020   • HYSTERECTOMY      partial   • LAMINECTOMY  1989    L5-S1   • TUBAL ABDOMINAL LIGATION  1979     General Information     Row Name 09/20/21 1304          OT Time and Intention    Document Type  evaluation  -AV     Mode of Treatment  individual therapy;occupational therapy  -AV     Row Name 09/20/21 1304          General Information    Patient Profile Reviewed  yes  -AV     Prior Level of Function  min assist:;ADL's;all household mobility  reports patient has required minimal assistance with ADLs x 6 mos. (I) feeding w setup. stands to clean teeth(I)/assist further grooming.  Stands to shower/walk-in without DME.  Ambulates without device.  No home oxygen.  -AV     Existing Precautions/Restrictions  fall  -AV     Barriers to Rehab  none identified  -AV     Row Name 09/20/21 1304          Occupational Profile    Reason for Services/Referral (Occupational Profile)  Patient is a 66 year old female admitted to [facility] on September 18, 2021.  OT consulted due to recent decline in ADL/transfer independence.  No previous OT services for current condition.  -AV     Row Name 09/20/21 1304          Living Environment    Lives With  spouse  -AV     Row Name 09/20/21 1304          Home Main Entrance    Number of Stairs, Main Entrance  none  -AV     Row Name 09/20/21 1304          Cognition    Orientation Status (Cognition)  -- Alert, pleasantly confused.  Decreased attention to task.  Repeated directions required.  Dementia  -AV     Row Name 09/20/21 1304          Safety Issues, Functional Mobility    Impairments Affecting Function (Mobility)  balance;cognition;endurance/activity tolerance  -AV       User Key  (r) = Recorded By, (t) = Taken By, (c) = Cosigned By    Initials Name Provider Type    AV Bang Lozano OT Occupational Therapist          Mobility/ADL's     Row Name 09/20/21 1308          Bed Mobility    Bed Mobility  supine-sit-supine  -AV      Supine-Sit-Supine [place] (Bed Mobility)  standby assist  -AV     Row Name 09/20/21 1308          Transfers    Sit-Stand [place] (Transfers)  contact guard  -AV     Row Name 09/20/21 1308          Activities of Daily Living    BADL Assessment/Intervention  -- (I) feeding/set up.  Mod assist bathing/dressing/grooming with set up.  Min assist toileting.  Cues required for ADLs  -AV       User Key  (r) = Recorded By, (t) = Taken By, (c) = Cosigned By    Initials Name Provider Type    Bang Bowman OT Occupational Therapist        Obj/Interventions     Row Name 09/20/21 1310          Sensory Assessment (Somatosensory)    Sensory Assessment (Somatosensory)  UE sensation intact  -AV     Row Name 09/20/21 1310          Vision Assessment/Intervention    Visual Impairment/Limitations  WFL;corrective lenses full-time  -AV     Row Name 09/20/21 1310          Range of Motion Comprehensive    General Range of Motion  bilateral upper extremity ROM WFL  -AV     Row Name 09/20/21 1310          Strength Comprehensive (MMT)    Comment, General Manual Muscle Testing (MMT) Assessment  4/5 upper extremities  -AV     Row Name 09/20/21 1310          Motor Skills    Motor Skills  coordination;functional endurance  -AV     Coordination  -- Right dominant  -AV     Functional Endurance  Fair minus  -AV     Row Name 09/20/21 1310          Balance    Dynamic Standing Balance  mild impairment  -AV       User Key  (r) = Recorded By, (t) = Taken By, (c) = Cosigned By    Initials Name Provider Type    Bang Bowman OT Occupational Therapist        Goals/Plan     Row Name 09/20/21 1311          Transfer Goal 1 (OT)    Activity/Assistive Device (Transfer Goal 1, OT)  transfers, all  -AV     [place] Level/Cues Needed (Transfer Goal 1, OT)  standby assist  -AV     Time Frame (Transfer Goal 1, OT)  long term goal (LTG);10 days  -AV     Row Name 09/20/21 1311          Bathing Goal 1 (OT)    Activity/Device (Bathing Goal 1, OT)   bathing skills, all  -AV     [place] Level/Cues Needed (Bathing Goal 1, OT)  minimum assist (75% or more patient effort);set-up required;verbal cues required  -AV     Time Frame (Bathing Goal 1, OT)  long term goal (LTG);10 days  -AV     Row Name 09/20/21 1311          Dressing Goal 1 (OT)    Activity/Device (Dressing Goal 1, OT)  dressing skills, all  -AV     [place]/Cues Needed (Dressing Goal 1, OT)  minimum assist (75% or more patient effort);set-up required;verbal cues required  -AV     Time Frame (Dressing Goal 1, OT)  long term goal (LTG);10 days  -AV     Row Name 09/20/21 1311          Toileting Goal 1 (OT)    Activity/Device (Toileting Goal 1, OT)  toileting skills, all  -AV     [place] Level/Cues Needed (Toileting Goal 1, OT)  minimum assist (75% or more patient effort);set-up required;verbal cues required  -AV     Time Frame (Toileting Goal 1, OT)  long term goal (LTG);10 days  -AV     [facility] Name 09/20/21 1311          Grooming Goal 1 (OT)    Activity/Device (Grooming Goal 1, OT)  grooming skills, all Standing at sink  -AV     [place] (Grooming Goal 1, OT)  minimum assist (75% or more patient effort);set-up required;verbal cues required  -AV     Time Frame (Grooming Goal 1, OT)  long term goal (LTG);10 days  -AV     [facility] Name 09/20/21 1311          Therapy Assessment/Plan (OT)    Planned Therapy Interventions (OT)  activity tolerance training;BADL retraining;functional balance retraining;occupation/activity based interventions;patient/caregiver education/training;transfer/mobility retraining  -AV       User Key  (r) = Recorded By, (t) = Taken By, (c) = Cosigned By    Initials Name Provider Type    Bang Bowman, OT Occupational Therapist        Clinical Impression     Row Name 09/20/21 1310          Pain Assessment    Additional Documentation  Pain Scale: FACES Pre/Post-Treatment (Group)  -AV     Row Name 09/20/21 1310          Pain Scale: FACES Pre/Post-Treatment    Pain: FACES Scale,  Pretreatment  0-->no hurt  -AV     Posttreatment Pain Rating  0-->no hurt  -AV     Row Name 09/20/21 1310          Plan of Care Review    Plan of Care Reviewed With  patient;spouse  -AV     Progress  no change First session  -AV     Outcome Summary  Patient presents with limitations of cognition/safety awareness, endurance/activity tolerance and balance which impede her ability to perform ADL and transfers as prior.  The skills of a therapist will be required to safely and effectively implement treatment plan to restore maximal level of function.  -AV     Row Name 09/20/21 1310          Therapy Assessment/Plan (OT)    Patient/Family Therapy Goal Statement (OT)  Maximize independence  -AV     Rehab Potential (OT)  good, to achieve stated therapy goals  -AV     Criteria for Skilled Therapeutic Interventions Met (OT)  yes;meets criteria;skilled treatment is necessary  -AV     Therapy Frequency (OT)  5 times/wk  -AV     Row Name 09/20/21 1310          Therapy Plan Review/Discharge Plan (OT)    Anticipated Discharge Disposition (OT)  home with assist;home with home health  -AV       User Key  (r) = Recorded By, (t) = Taken By, (c) = Cosigned By    Initials Name Provider Type    AV Bang Lozano, OT Occupational Therapist        Outcome Measures     Row Name 09/20/21 1312          How much help from another is currently needed...    Putting on and taking off regular lower body clothing?  2  -AV     Bathing (including washing, rinsing, and drying)  2  -AV     Toileting (which includes using toilet bed pan or urinal)  3  -AV     Putting on and taking off regular upper body clothing  3  -AV     Taking care of personal grooming (such as brushing teeth)  3  -AV     Eating meals  4  -AV     AM-PAC 6 Clicks Score (OT)  17  -AV     Row Name 09/20/21 0958          How much help from another person do you currently need...    Turning from your back to your side while in flat bed without using bedrails?  4  -WM     Moving from  lying on back to sitting on the side of a flat bed without bedrails?  4  -WM     Moving to and from a bed to a chair (including a wheelchair)?  3  -WM     Standing up from a chair using your arms (e.g., wheelchair, bedside chair)?  3  -WM     Climbing 3-5 steps with a railing?  3  -WM     To walk in hospital room?  3  -WM     AM-PAC 6 Clicks Score (PT)  20  -WM     Row Name 09/20/21 1312          Functional Assessment    Outcome Measure Options  AM-PAC 6 Clicks Daily Activity (OT);Optimal Instrument  -AV     Row Name 09/20/21 1312          Optimal Instrument    Optimal Instrument  Optimal - 3  -AV     Bending/Stooping  2  -AV     Standing  2  -AV     Reaching  1  -AV     From the list, choose the 3 activities you would most like to be able to do without any difficulty  Bending/stooping;Standing;Reaching  -AV     Total Score Optimal - 3  5  -AV       User Key  (r) = Recorded By, (t) = Taken By, (c) = Cosigned By    Initials Name Provider Type    Dorian Ewing PTA Physical Therapy Assistant    Bang Bowman OT Occupational Therapist          Occupational Therapy Education                 Title: PT OT SLP Therapies (In Progress)     Topic: Occupational Therapy (Done)     Point: ADL training (Done)     Description:   Instruct learner(s) on proper safety adaptation and remediation techniques during self care or transfers.   Instruct in proper use of assistive devices.              Learning Progress Summary           Patient Acceptance, E, VU by AV at 9/20/2021 1314   Significant Other Acceptance, E, VU by AV at 9/20/2021 1314                   Point: Home exercise program (Done)     Description:   Instruct learner(s) on appropriate technique for monitoring, assisting and/or progressing therapeutic exercises/activities.              Learning Progress Summary           Patient Acceptance, E, VU by AV at 9/20/2021 1314   Significant Other Acceptance, E, VU by AV at 9/20/2021 1314                   Point:  Precautions (Done)     Description:   Instruct learner(s) on prescribed precautions during self-care and functional transfers.              Learning Progress Summary           Patient Acceptance, E, VU by AV at 9/20/2021 1314   Significant Other Acceptance, E, VU by AV at 9/20/2021 1314                   Point: Body mechanics (Done)     Description:   Instruct learner(s) on proper positioning and spine alignment during self-care, functional mobility activities and/or exercises.              Learning Progress Summary           Patient Acceptance, E, VU by AV at 9/20/2021 1314   Significant Other Acceptance, E, VU by AV at 9/20/2021 1314                               User Key     Initials Effective Dates Name Provider Type Discipline    AV 06/16/21 -  Bang Lozano OT Occupational Therapist OT              OT Recommendation and Plan  Planned Therapy Interventions (OT): activity tolerance training, BADL retraining, functional balance retraining, occupation/activity based interventions, patient/caregiver education/training, transfer/mobility retraining  Therapy Frequency (OT): 5 times/wk  Plan of Care Review  Plan of Care Reviewed With: patient, spouse  Progress: no change (First session)  Outcome Summary: Patient presents with limitations of cognition/safety awareness, endurance/activity tolerance and balance which impede her ability to perform ADL and transfers as prior.  The skills of a therapist will be required to safely and effectively implement treatment plan to restore maximal level of function.     Time Calculation:   Time Calculation- OT     Row Name 09/20/21 1315 09/20/21 0954          Time Calculation- OT    OT Received On  09/20/21  -AV  --     OT Goal Re-Cert Due Date  09/29/21  -AV  --        Timed Charges    35830 - Gait Training Minutes   --  7  -WM        Untimed Charges    OT Eval/Re-eval Minutes  35  -AV  --        Total Minutes    Timed Charges Total Minutes  --  7  -WM     Untimed Charges Total  Minutes  35  -AV  --      Total Minutes  35  -AV  7  -WM       User Key  (r) = Recorded By, (t) = Taken By, (c) = Cosigned By    Initials Name Provider Type    Dorian Ewing PTA Physical Therapy Assistant    Bang Bowman OT Occupational Therapist        Therapy Charges for Today     Code Description Service Date Service Provider Modifiers Qty    10285762455 HC OT EVAL LOW COMPLEXITY 3 9/20/2021 Bang Lozano OT GO 1               Bang Lozano OT  9/20/2021

## 2021-09-20 NOTE — THERAPY TREATMENT NOTE
Acute Care - Physical Therapy Treatment Note  [facility]     Patient Name: Marissa Rob  : 1955  MRN: [id number]  Today's Date: 2021      Visit Dx:     ICD-10-CM ICD-9-CM   1. Abdominal pain, unspecified abdominal location  R10.9 789.00   2. Weakness  R53.1 780.79   3. Difficulty walking  R26.2 719.7     Patient Active Problem List   Diagnosis   • Arthritis   • Dementia (CMS/HCC)   • Hepatitis A   • Cerebrovascular disease   • Bladder problem   • Hypertension   • Vascular disease, peripheral (CMS/HCC)   • Memory loss   • Microhematuria   • Obsessive-compulsive disorder   • Postmenopausal syndrome   • Hypothyroidism   • Vascular dementia (CMS/HCC)   • Thyroid disease   • Vitamin D deficiency   • UTI (urinary tract infection)   • AMS (altered mental status)     Past Medical History:   Diagnosis Date   • Arthritis    • Bladder problem    • Cerebrovascular disease 2015   • Dementia (CMS/HCC) 2018    3yrs memory change, neuropsych test .on aricept switched to memantine. MRI of brain revealed few MILD scattered FLAIR hyperintensities. Reluctant to attribute to memory issues. noted to have atrophy of the bitemporal region a component of Alzheimer's dementia. Refer for repeat neuropsych testing. titrate her memory/ 10 mg twice per day   • Hepatitis A    • Hypertension    • Hypomagnesemia 2016   • Hypothyroidism 2015   • Leg pain    • Memory loss 2015   • Microhematuria    • Muscle cramps    • Obsessive compulsive disorder    • Pap smear for cervical cancer screening     no longer   • Postmenopausal syndrome 2017   • Thyroid disease    • Vascular dementia (CMS/HCC) 08/10/2015   • Vascular disease, peripheral (CMS/HCC)    • Vitamin D deficiency      Past Surgical History:   Procedure Laterality Date   • BACK SURGERY     • BLADDER REPAIR     • COLONOSCOPY  2020    POLYP   • CYSTOSCOPY  2020   • HYSTERECTOMY      partial   • LAMINECTOMY      L5-S1   •  TUBAL ABDOMINAL LIGATION  1979        PT Assessment (last 12 hours)      PT Evaluation and Treatment     Row Name 09/20/21 0955          Physical Therapy Time and Intention    Subjective Information  no complaints  -     Document Type  therapy note (daily note)  -     Mode of Treatment  individual therapy;physical therapy  -     Total Minutes, Physical Therapy  24  -WM     Patient Effort  good  -     Symptoms Noted During/After Treatment  fatigue  -     Row Name 09/20/21 0955          Cognition    Affect/Mental Status (Cognitive)  confused  -     Row Name 09/20/21 0955          Bed Mobility    Supine-Sit-Supine [place] (Bed Mobility)  standby assist  -     Assistive Device (Bed Mobility)  bed rails  -     Row Name 09/20/21 0955          Transfers    Transfers  stand-sit transfer  -     Sit-Stand [place] (Transfers)  contact guard  -     Stand-Sit [place] (Transfers)  contact guard  -     Row Name 09/20/21 0955          Sit-Stand Transfer    Assistive Device (Sit-Stand Transfers)  -- No A.D.  -     Row Name 09/20/21 0955          Stand-Sit Transfer    Assistive Device (Stand-Sit Transfers)  -- No A.D.  -     Row Name 09/20/21 0955          Gait/Stairs (Locomotion)    [place] Level (Gait)  contact guard  -     Assistive Device (Gait)  -- No A.D.  -     Distance in Feet (Gait)  200  -     Row Name 09/20/21 0955          Safety Issues, Functional Mobility    Impairments Affecting Function (Mobility)  balance;cognition;endurance/activity tolerance;strength  -     Cognitive Impairments, Mobility Safety/Performance  sequencing abilities  -     Row Name 09/20/21 0955          Balance    Dynamic Standing Balance  mild impairment  -     Row Name 09/20/21 0955          Motor Skills    Therapeutic Exercise  hip;knee  -     Row Name 09/20/21 0955          Hip (Therapeutic Exercise)    Hip (Therapeutic Exercise)  strengthening exercise  -     Hip Strengthening  (Therapeutic Exercise)  bilateral;aBduction;aDduction;marching while seated;sitting 15 x 2  -WM     Row Name 09/20/21 0955          Knee (Therapeutic Exercise)    Knee (Therapeutic Exercise)  strengthening exercise  -     Knee Strengthening (Therapeutic Exercise)  bilateral;LAQ (long arc quad);hamstring curls;sitting 15 x 2  -WM     Row Name 09/20/21 0955          Progress Summary (PT)    Progress Toward Functional Goals (PT)  progress toward functional goals is good  -       User Key  (r) = Recorded By, (t) = Taken By, (c) = Cosigned By    Initials Name Provider Type    WM Dorian Sutton PTA Physical Therapy Assistant        Physical Therapy Education                 Title: PT OT SLP Therapies (In Progress)     Topic: Physical Therapy (Done)     Point: Mobility training (Done)     Learning Progress Summary           Patient Acceptance, E,TB, VU,NR by AV at 9/19/2021 1355                   Point: Home exercise program (Done)     Learning Progress Summary           Patient Acceptance, E,TB, VU,NR by AV at 9/19/2021 1355                   Point: Body mechanics (Done)     Learning Progress Summary           Patient Acceptance, E,TB, VU,NR by AV at 9/19/2021 1355                   Point: Precautions (Done)     Learning Progress Summary           Patient Acceptance, E,TB, VU,NR by AV at 9/19/2021 1355                               User Key     Initials Effective Dates Name Provider Type Discipline    AV 06/11/21 -  Damion Garcia, PT Physical Therapist PT              PT Recommendation and Plan     Progress Summary (PT)  Progress Toward Functional Goals (PT): progress toward functional goals is good  Outcome Measures     Row Name 09/20/21 0958 09/19/21 1300          How much help from another person do you currently need...    Turning from your back to your side while in flat bed without using bedrails?  4  -WM  4  -AV     Moving from lying on back to sitting on the side of a flat bed without bedrails?  4  -WM   4  -AV     Moving to and from a bed to a chair (including a wheelchair)?  3  -WM  3  -AV     Standing up from a chair using your arms (e.g., wheelchair, bedside chair)?  3  -WM  3  -AV     Climbing 3-5 steps with a railing?  3  -WM  3  -AV     To walk in hospital room?  3  -WM  3  -AV     AM-PAC 6 Clicks Score (PT)  20  -WM  20  -AV        Functional Assessment    Outcome Measure Options  --  AM-PAC 6 Clicks Basic Mobility (PT)  -AV       User Key  (r) = Recorded By, (t) = Taken By, (c) = Cosigned By    Initials Name Provider Type    Dorian Ewing PTA Physical Therapy Assistant    AV Damion Garcia, [place]KE[place] Physical Therapist           Time Calculation:   PT Charges     Row Name 09/20/21 0954             Time Calculation    PT Received On  09/20/21  -      PT Goal Re-Cert Due Date  09/28/21  -WM         Timed Charges    91817 - PT Therapeutic Exercise Minutes  14  -WM      46846 - Gait Training Minutes   7  -WM      87560 - PT Therapeutic Activity Minutes  3  -WM         Total Minutes    Timed Charges Total Minutes  24  -WM       Total Minutes  24  -WM        User Key  (r) = Recorded By, (t) = Taken By, (c) = Cosigned By    Initials Name Provider Type    Dorian Ewing PTA Physical Therapy Assistant        Therapy Charges for Today     Code Description Service Date Service Provider Modifiers Qty    99027674852 HC PT THER PROC EA 15 MIN 9/20/2021 Dorian Sutton PTA GP 1    00562493013 HC GAIT TRAINING EA 15 MIN 9/20/2021 Dorian Sutton PTA GP 1          PT G-Codes  Outcome Measure Options: AM-PAC 6 Clicks Basic Mobility (PT)  AM-PAC 6 Clicks Score (PT): 20    Dorian Sutton PTA  9/20/2021

## 2021-09-20 NOTE — PLAN OF CARE
Problem: Adult Inpatient Plan of Care  Goal: Plan of Care Review  9/20/2021 0601 by Edna France, RN  Outcome: Ongoing, Progressing  Flowsheets (Taken 9/20/2021 0601)  Progress: no change     Problem: Adult Inpatient Plan of Care  Goal: Plan of Care Review  9/20/2021 0558 by Edna France, RN  Outcome: Ongoing, Progressing  Flowsheets (Taken 9/20/2021 0558)  Progress: no change  Plan of Care Reviewed With: patient   Goal Outcome Evaluation:  Plan of Care Reviewed With: patient        Progress: no change

## 2021-09-20 NOTE — CASE MANAGEMENT/SOCIAL WORK
Discharge Planning Assessment   Jakub     Patient Name: Marissa Rob  MRN: [id number]  Today's Date: 9/20/2021    Admit Date: 9/18/2021    Discharge Needs Assessment     Row Name 09/20/21 1015       Living Environment    Lives With  spouse    Name(s) of Who Lives With Patient  Jorge Rob    Current Living Arrangements  home/apartment/condo    Primary Care Provided by  self;spouse/significant other    Provides Primary Care For  no one    Able to Return to Prior Arrangements  yes    Living Arrangement Comments  Pt's  states that he would like pt to return home and feels that he will be able to care for her without assistance. He states that he saw pt this morning and thinks that she back to her baseline.       Transition Planning    Patient/Family Anticipates Transition to  home with family    Patient/Family Anticipated Services at Transition  none    Transportation Anticipated  family or friend will provide       Discharge Needs Assessment    Equipment Currently Used at Home  none    Concerns to be Addressed  no discharge needs identified    Equipment Needed After Discharge  none        Discharge Plan     Row Name 09/20/21 1017       Plan    Plan Comments  Plan is for pt to discharge home with her . MD updated.    Final Discharge Disposition Code  01 - home or self-care        Continued Care and Services - Admitted Since 9/18/2021    Coordination has not been started for this encounter.         Demographic Summary     Row Name 09/20/21 1012       General Information    Admission Type  inpatient    Arrived From  home    Referral Source  physician    Reason for Consult  discharge planning    Preferred Language  English     Used During This Interaction  no        Functional Status     Row Name 09/20/21 1013       Functional Status    Usual Activity Tolerance  good    Current Activity Tolerance  good       Functional Status, IADL    Medications  other (see comments)    Meal  Preparation  other (see comments)    Housekeeping  other (see comments)    Laundry  other (see comments)    Shopping  other (see comments)    IADL Comments  When asked about independence, pt's  states that she was independent about 75-80% of the time prior to admission.        Psychosocial    No documentation.       Abuse/Neglect    No documentation.       Legal     Row Name 09/20/21 1015       Legal    Criminal Activity/Legal Involvement  none        Substance Abuse    No documentation.       Patient Forms    No documentation.           ZARA Wu

## 2021-09-20 NOTE — ACP (ADVANCE CARE PLANNING)
Patient has LW and ACD at home in safe. Does not want to present to hospital until they want to use it. -KATARZYNA MaloneyN, RN, PN   Palliative Care    9/20/2021 10:36 EDT

## 2021-09-20 NOTE — CONSULTS
Purpose of the visit was to evaluate for: goals of care/advanced care planning. Spoke with RN, patient and family and discussed goals of care and clarify code status.      Assessment:Patient is on SNF (being used as a med surg floor), on room air, reports no pain or nausea at this time. Patient talks well, unable to find certain words. Reports she still has good quality of life.     Recommendations/Plan: Home self care.    Tasks Completed: Code Status clarification.    Other Comments: Spoke with patient and  at the bedside. The patient has a living will and ACD at home in the safe however they do not want to bring in until needed as patient has written in paperwork that she would be a DNR. Patient and  has had a good discussion in the past about when they would move to DNR. At this time the patient wishes to remain a full code/full treatment. Patient and family do not wish to have rehab or home health and request home self care.   -DEONDRE Maloney, RN, [facility]   Palliative Care    9/20/2021 10:40 EDT

## 2021-09-21 ENCOUNTER — TRANSITIONAL CARE MANAGEMENT TELEPHONE ENCOUNTER (OUTPATIENT)
Dept: CALL CENTER | Facility: HOSPITAL | Age: 66
End: 2021-09-21

## 2021-09-21 LAB — QT INTERVAL: 431 MS

## 2021-09-21 NOTE — OUTREACH NOTE
Prep Survey      Responses   [facility] patient discharged from?  Call   Is LACE score < 7 ?  No   Emergency Room discharge w/ pulse ox?  No   Eligibility  [facility] Call   Date of Admission  09/18/21   Date of Discharge  09/20/21   Discharge Disposition  Home or Self Care   Discharge diagnosis  Dementia, bladder issues,    Does the patient have one of the following disease processes/diagnoses(primary or secondary)?  Other   Does the patient have Home health ordered?  No   Is there a DME ordered?  No   Prep survey completed?  Yes          Lucy Miranda RN

## 2021-09-21 NOTE — OUTREACH NOTE
Call Center TCM Note      Responses   [facility] patient discharged from?  Call   Does the patient have one of the following disease processes/diagnoses(primary or secondary)?  Other   TCM attempt successful?  No   Unsuccessful attempts  Attempt 1          Aicha Mancera RN    9/21/2021, 11:28 EDT

## 2021-09-21 NOTE — OUTREACH NOTE
Call Center TCM Note      Responses   [facility] patient discharged from?  [facility]   Does the patient have one of the following disease processes/diagnoses(primary or secondary)?  Other   TCM attempt successful?  No   Unsuccessful attempts  Attempt 2   Call Status  Left message [Message left on identified voicemail box--verbal release on chart, will reattempt 9/22/21]          Aicha Mancera RN    9/21/2021, 14:11 EDT

## 2021-09-21 NOTE — THERAPY DISCHARGE NOTE
Acute Care - Physical Therapy Treatment Note/Discharge   Jakub     Patient Name: Marissa Rob  : 1955  MRN: [id number]  Today's Date: 2021                Admit Date: 2021    Visit Dx:    ICD-10-CM ICD-9-CM   1. Abdominal pain, unspecified abdominal location  R10.9 789.00   2. Weakness  R53.1 780.79   3. Difficulty walking  R26.2 719.7   4. Hypertension, unspecified type  I10 401.9   5. Decreased activities of daily living (ADL)  Z78.9 V49.89     Patient Active Problem List   Diagnosis   • Arthritis   • Dementia (CMS/HCC)   • Hepatitis A   • Cerebrovascular disease   • Bladder problem   • Hypertension   • Vascular disease, peripheral (CMS/HCC)   • Memory loss   • Microhematuria   • Obsessive-compulsive disorder   • Postmenopausal syndrome   • Hypothyroidism   • Vascular dementia (CMS/HCC)   • Thyroid disease   • Vitamin D deficiency   • UTI (urinary tract infection)   • AMS (altered mental status)     Past Medical History:   Diagnosis Date   • Arthritis    • Bladder problem    • Cerebrovascular disease 2015   • Dementia (CMS/HCC) 2018    3yrs memory change, neuropsych test .on aricept switched to memantine. MRI of brain revealed few MILD scattered FLAIR hyperintensities. Reluctant to attribute to memory issues. noted to have atrophy of the bitemporal region a component of Alzheimer's dementia. Refer for repeat neuropsych testing. titrate her memory/ 10 mg twice per day   • Hepatitis A    • Hypertension    • Hypomagnesemia 2016   • Hypothyroidism 2015   • Leg pain    • Memory loss 2015   • Microhematuria    • Muscle cramps    • Obsessive compulsive disorder    • Pap smear for cervical cancer screening     no longer   • Postmenopausal syndrome 2017   • Thyroid disease    • Vascular dementia (CMS/HCC) 08/10/2015   • Vascular disease, peripheral (CMS/HCC)    • Vitamin D deficiency      Past Surgical History:   Procedure Laterality Date   • BACK SURGERY      • BLADDER REPAIR  2000   • COLONOSCOPY  06/2020    POLYP   • CYSTOSCOPY  05/28/2020   • HYSTERECTOMY      partial   • LAMINECTOMY  1989    L5-S1   • TUBAL ABDOMINAL LIGATION  1979          PT Assessment (last 12 hours)      PT Evaluation and Treatment     Row Name 09/21/21 1500          Progress Summary (PT)    Reason for Discharge (PT)  patient discharged from this facility  -DP     Row Name 09/21/21 1500          Discharge Summary (PT)    Additional Documentation  Discharge Summary (PT) (Group)  -DP     Row Name 09/21/21 1500          Discharge Summary (PT)    Outcomes Achieved/Progress Made Upon Discharge (PT)  goals partially achieved prior to discharge  -DP     Transfer to Another Level of Care or Facility (PT)  recommend continued therapy following discharge  -DP       User Key  (r) = Recorded By, (t) = Taken By, (c) = Cosigned By    Initials Name Provider Type    DP Jamaal Licea, PT Physical Therapist          Physical Therapy Education                 Title: PT OT SLP Therapies (In Progress)     Topic: Physical Therapy (Done)     Point: Mobility training (Done)     Learning Progress Summary           Patient Acceptance, E,TB, VU,NR by AV at 9/19/2021 1355                   Point: Home exercise program (Done)     Learning Progress Summary           Patient Acceptance, E,TB, VU,NR by AV at 9/19/2021 1355                   Point: Body mechanics (Done)     Learning Progress Summary           Patient Acceptance, E,TB, VU,NR by AV at 9/19/2021 1355                   Point: Precautions (Done)     Learning Progress Summary           Patient Acceptance, E,TB, VU,NR by AV at 9/19/2021 1355                               User Key     Initials Effective Dates Name Provider Type Discipline    AV 06/11/21 -  Damion Garcia, PT Physical Therapist PT                PT Recommendation and Plan          Outcome Measures     Row Name 09/20/21 0958 09/19/21 1300          How much help from another person do you currently  need...    Turning from your back to your side while in flat bed without using bedrails?  4  -WM  4  -AV     Moving from lying on back to sitting on the side of a flat bed without bedrails?  4  -WM  4  -AV     Moving to and from a bed to a chair (including a wheelchair)?  3  -WM  3  -AV     Standing up from a chair using your arms (e.g., wheelchair, bedside chair)?  3  -WM  3  -AV     Climbing 3-5 steps with a railing?  3  -WM  3  -AV     To walk in hospital room?  3  -WM  3  -AV     AM-PAC 6 Clicks Score (PT)  20  -  20  -AV        Functional Assessment    Outcome Measure Options  --  AM-PAC 6 Clicks Basic Mobility (PT)  -AV       User Key  (r) = Recorded By, (t) = Taken By, (c) = Cosigned By    Initials Name Provider Type     Dorian Sutton, PTA Physical Therapy Assistant    AV Damion Garcia, PT Physical Therapist           Time Calculation:         PT G-Codes  Outcome Measure Options: AM-PAC 6 Clicks Daily Activity (OT), Optimal Instrument  AM-PAC 6 Clicks Score (PT): 20  AM-PAC 6 Clicks Score (OT): 17         Jamaal Licea, PT  9/21/2021

## 2021-09-21 NOTE — THERAPY DISCHARGE NOTE
Acute Care - Occupational Therapy Discharge  [facility]    Patient Name: Marissa Rob  : 1955    MRN: [id number]                              Today's Date: 2021       Admit Date: 2021    Visit Dx:     ICD-10-CM ICD-9-CM   1. Abdominal pain, unspecified abdominal location  R10.9 789.00   2. Weakness  R53.1 780.79   3. Difficulty walking  R26.2 719.7   4. Hypertension, unspecified type  I10 401.9   5. Decreased activities of daily living (ADL)  Z78.9 V49.89     Patient Active Problem List   Diagnosis   • Arthritis   • Dementia (CMS/HCC)   • Hepatitis A   • Cerebrovascular disease   • Bladder problem   • Hypertension   • Vascular disease, peripheral (CMS/HCC)   • Memory loss   • Microhematuria   • Obsessive-compulsive disorder   • Postmenopausal syndrome   • Hypothyroidism   • Vascular dementia (CMS/HCC)   • Thyroid disease   • Vitamin D deficiency   • UTI (urinary tract infection)   • AMS (altered mental status)     Past Medical History:   Diagnosis Date   • Arthritis    • Bladder problem    • Cerebrovascular disease 2015   • Dementia (CMS/HCC) 2018    3yrs memory change, neuropsych test .on aricept switched to memantine. MRI of brain revealed few MILD scattered FLAIR hyperintensities. Reluctant to attribute to memory issues. noted to have atrophy of the bitemporal region a component of Alzheimer's dementia. Refer for repeat neuropsych testing. titrate her memory/ 10 mg twice per day   • Hepatitis A    • Hypertension    • Hypomagnesemia 2016   • Hypothyroidism 2015   • Leg pain    • Memory loss 2015   • Microhematuria    • Muscle cramps    • Obsessive compulsive disorder    • Pap smear for cervical cancer screening     no longer   • Postmenopausal syndrome 2017   • Thyroid disease    • Vascular dementia (CMS/HCC) 08/10/2015   • Vascular disease, peripheral (CMS/HCC)    • Vitamin D deficiency      Past Surgical History:   Procedure Laterality Date   •  BACK SURGERY     • BLADDER REPAIR  2000   • COLONOSCOPY  06/2020    POLYP   • CYSTOSCOPY  05/28/2020   • HYSTERECTOMY      partial   • LAMINECTOMY  1989    L5-S1   • TUBAL ABDOMINAL LIGATION  1979     General Information    No documentation.       Mobility/ADL's    No documentation.       Obj/Interventions    No documentation.       Goals/Plan     Row Name 09/21/21 1316          Transfer Goal 1 (OT)    Activity/Assistive Device (Transfer Goal 1, OT)  transfers, all  -AV     [place] Level/Cues Needed (Transfer Goal 1, OT)  standby assist  -AV     Time Frame (Transfer Goal 1, OT)  long term goal (LTG);10 days  -AV     Progress/Outcome (Transfer Goal 1, OT)  goal not met  -AV     Row Name 09/21/21 1316          Bathing Goal 1 (OT)    Activity/Device (Bathing Goal 1, OT)  bathing skills, all  -AV     [place] Level/Cues Needed (Bathing Goal 1, OT)  minimum assist (75% or more patient effort);set-up required;verbal cues required  -AV     Time Frame (Bathing Goal 1, OT)  long term goal (LTG);10 days  -AV     Progress/Outcomes (Bathing Goal 1, OT)  goal not met  -     Row Name 09/21/21 1316          Dressing Goal 1 (OT)    Activity/Device (Dressing Goal 1, OT)  dressing skills, all  -AV     [place]/Cues Needed (Dressing Goal 1, OT)  minimum assist (75% or more patient effort);set-up required;verbal cues required  -AV     Progress/Outcome (Dressing Goal 1, OT)  goal not met  -     Row Name 09/21/21 1316          Toileting Goal 1 (OT)    Activity/Device (Toileting Goal 1, OT)  toileting skills, all  -AV     [place] Level/Cues Needed (Toileting Goal 1, OT)  minimum assist (75% or more patient effort);set-up required;verbal cues required  -AV     Time Frame (Toileting Goal 1, OT)  long term goal (LTG);10 days  -AV     Progress/Outcome (Toileting Goal 1, OT)  goal not met  -     Row Name 09/21/21 1316          Grooming Goal 1 (OT)    Activity/Device (Grooming Goal 1, OT)  grooming skills, all  -AV      [place] (Grooming Goal 1, OT)  minimum assist (75% or more patient effort);set-up required;verbal cues required  -AV     Time Frame (Grooming Goal 1, OT)  long term goal (LTG);10 days  -AV     Progress/Outcome (Grooming Goal 1, OT)  goal not met  -AV       User Key  (r) = Recorded By, (t) = Taken By, (c) = Cosigned By    Initials Name Provider Type    AV Bang Lozano OT Occupational Therapist        Clinical Impression    No documentation.       Outcome Measures    No documentation.       Occupational Therapy Education                 Title: PT OT SLP Therapies (In Progress)     Topic: Occupational Therapy (Done)     Point: ADL training (Done)     Description:   Instruct learner(s) on proper safety adaptation and remediation techniques during self care or transfers.   Instruct in proper use of assistive devices.              Learning Progress Summary           Patient Acceptance, E, VU by AV at 9/20/2021 1314   Significant Other Acceptance, E, VU by AV at 9/20/2021 1314                   Point: Home exercise program (Done)     Description:   Instruct learner(s) on appropriate technique for monitoring, assisting and/or progressing therapeutic exercises/activities.              Learning Progress Summary           Patient Acceptance, E, VU by AV at 9/20/2021 1314   Significant Other Acceptance, E, VU by AV at 9/20/2021 1314                   Point: Precautions (Done)     Description:   Instruct learner(s) on prescribed precautions during self-care and functional transfers.              Learning Progress Summary           Patient Acceptance, E, VU by AV at 9/20/2021 1314   Significant Other Acceptance, E, VU by AV at 9/20/2021 1314                   Point: Body mechanics (Done)     Description:   Instruct learner(s) on proper positioning and spine alignment during self-care, functional mobility activities and/or exercises.              Learning Progress Summary           Patient Acceptance, E, VU by AV at  9/20/2021 1314   Significant Other Acceptance, E, VU by JARRED at 9/20/2021 1314                               User Key     Initials Effective Dates Name Provider Type Discipline    JARRED 06/16/21 -  Bang Lozano OT Occupational Therapist OT              OT Recommendation and Plan  Retired Outcome Summary/Treatment Plan (OT)  Anticipated Discharge Disposition (OT): home with assist, home with home health  Planned Therapy Interventions (OT): activity tolerance training, BADL retraining, functional balance retraining, occupation/activity based interventions, patient/caregiver education/training, transfer/mobility retraining  Therapy Frequency (OT): 5 times/wk  Plan of Care Review  Plan of Care Reviewed With: patient, spouse  Progress: no change (First session)  Outcome Summary: Patient presents with limitations of cognition/safety awareness, endurance/activity tolerance and balance which impede her ability to perform ADL and transfers as prior.  The skills of a therapist will be required to safely and effectively implement treatment plan to restore maximal level of function.  Plan of Care Reviewed With: patient, spouse  Outcome Summary: Patient presents with limitations of cognition/safety awareness, endurance/activity tolerance and balance which impede her ability to perform ADL and transfers as prior.  The skills of a therapist will be required to safely and effectively implement treatment plan to restore maximal level of function.     Time Calculation:     Therapy Charges for Today     Code Description Service Date Service Provider Modifiers Qty    [id number]  OT EVAL LOW COMPLEXITY 3 9/20/2021 Bang Lozano OT GO 1               Bang Lozano OT  9/21/2021

## 2021-09-22 ENCOUNTER — TRANSITIONAL CARE MANAGEMENT TELEPHONE ENCOUNTER (OUTPATIENT)
Dept: CALL CENTER | Facility: HOSPITAL | Age: 66
End: 2021-09-22

## 2021-09-22 NOTE — OUTREACH NOTE
Call Center TCM Note      Responses   [facility] patient discharged from?  Call   Does the patient have one of the following disease processes/diagnoses(primary or secondary)?  Other   TCM attempt successful?  No   Unsuccessful attempts  Attempt 3          Aicha Mancera RN    9/22/2021, 11:37 EDT

## 2021-09-24 ENCOUNTER — OFFICE VISIT (OUTPATIENT)
Dept: FAMILY MEDICINE CLINIC | Facility: CLINIC | Age: 66
End: 2021-09-24

## 2021-09-24 VITALS
BODY MASS INDEX: 26.1 KG/M2 | HEART RATE: 78 BPM | OXYGEN SATURATION: 99 % | WEIGHT: 176.2 LBS | TEMPERATURE: 96.8 F | HEIGHT: 69 IN | SYSTOLIC BLOOD PRESSURE: 150 MMHG | DIASTOLIC BLOOD PRESSURE: 45 MMHG

## 2021-09-24 DIAGNOSIS — I10 HYPERTENSION, UNSPECIFIED TYPE: ICD-10-CM

## 2021-09-24 DIAGNOSIS — N39.0 FREQUENT UTI: Primary | ICD-10-CM

## 2021-09-24 DIAGNOSIS — F01.50 VASCULAR DEMENTIA WITHOUT BEHAVIORAL DISTURBANCE (HCC): ICD-10-CM

## 2021-09-24 DIAGNOSIS — Z79.890 HORMONE REPLACEMENT THERAPY: ICD-10-CM

## 2021-09-24 DIAGNOSIS — E55.9 VITAMIN D DEFICIENCY: ICD-10-CM

## 2021-09-24 DIAGNOSIS — K59.03 DRUG-INDUCED CONSTIPATION: ICD-10-CM

## 2021-09-24 PROCEDURE — 99495 TRANSJ CARE MGMT MOD F2F 14D: CPT | Performed by: NURSE PRACTITIONER

## 2021-09-24 PROCEDURE — 1111F DSCHRG MED/CURRENT MED MERGE: CPT | Performed by: NURSE PRACTITIONER

## 2021-09-24 RX ORDER — DIPHENOXYLATE HYDROCHLORIDE AND ATROPINE SULFATE 2.5; .025 MG/1; MG/1
1 TABLET ORAL 2 TIMES DAILY PRN
Qty: 30 TABLET | Refills: 0
Start: 2021-09-24 | End: 2021-09-25

## 2021-09-24 RX ORDER — NITROFURANTOIN 25; 75 MG/1; MG/1
100 CAPSULE ORAL DAILY
Qty: 30 CAPSULE | Refills: 0 | Status: SHIPPED | OUTPATIENT
Start: 2021-09-24 | End: 2021-10-08 | Stop reason: SDUPTHER

## 2021-09-24 RX ORDER — ERGOCALCIFEROL 1.25 MG/1
50000 CAPSULE ORAL WEEKLY
Qty: 12 CAPSULE | Refills: 1 | Status: SHIPPED | OUTPATIENT
Start: 2021-09-24 | End: 2022-05-19

## 2021-09-24 NOTE — PROGRESS NOTES
Transitional Care Follow Up Visit  Subjective     Marissa Rob is a 66 y.o. female who presents for a transitional care management visit.    Within 48 business hours after discharge our office contacted her via telephone to coordinate her care and needs.      I reviewed and discussed the details of that call along with the discharge summary, hospital problems, inpatient lab results, inpatient diagnostic studies, and consultation reports with Marissa.     Current outpatient and discharge medications have been reconciled for the patient.  Reviewed by: AUDREY Reynolds      Date of TCM Phone Call 9/20/2021   [facility]   Date of Admission 9/18/2021   Date of Discharge 9/20/2021   Discharge Disposition Home or Self Care     Risk for Readmission (LACE) Score: 8 (9/20/2021  6:01 AM)      History of Present Illness   Course During Hospital Stay:  She was admitted from 9/18//21 for UTI, altered mental status and metabolic encephalopathy.     Denies symptoms of UTI-she reports she is back to her baseline.    States her appetite is really good now but is wondering if she needs something to help with her constipation. She is taking lomotil 2 tabs bid-decreased to 1 tab bid. Instructed if she continues to have constipation decrease to 1 tab day.      The following portions of the patient's history were reviewed and updated as appropriate: allergies, current medications, past family history, past medical history, past social history, past surgical history and problem list.    Review of Systems    Objective   Physical Exam  Constitutional:       Appearance: Normal appearance.   Neck:      Thyroid: No thyroid mass, thyromegaly or thyroid tenderness.   Cardiovascular:      Rate and Rhythm: Normal rate and regular rhythm.      Pulses: Normal pulses.      Heart sounds: Normal heart sounds.   Pulmonary:      Effort: Pulmonary effort is normal.      Breath sounds: Normal breath sounds.    Musculoskeletal:      Right lower leg: No edema.      Left lower leg: No edema.   Neurological:      Mental Status: She is alert.         Assessment/Plan   Diagnoses and all orders for this visit:    1. Frequent UTI (Primary)  Comments:  placed on manintenance dose of Macrobid to prevent reoccurance of UTIS.   Orders:  -     nitrofurantoin, macrocrystal-monohydrate, (Macrobid) 100 MG capsule; Take 1 capsule by mouth Daily.  Dispense: 30 capsule; Refill: 0    2. Hormone replacement therapy    3. Vitamin D deficiency  -     vitamin D (ERGOCALCIFEROL) 1.25 MG (29816 UT) capsule capsule; Take 1 capsule by mouth 1 (One) Time Per Week.  Dispense: 12 capsule; Refill: 1    4. Hypertension, unspecified type  -     Discontinue: diphenoxylate-atropine (LOMOTIL) 2.5-0.025 MG per tablet; Take 1 tablet by mouth 2 (Two) Times a Day As Needed for Diarrhea.  Dispense: 30 tablet; Refill: 0    5. Drug-induced constipation  -     diphenoxylate-atropine (LOMOTIL) 2.5-0.025 MG per tablet; Take 1 tablet by mouth 2 (Two) Times a Day As Needed for Diarrhea.  Dispense: 30 tablet; Refill: 0    6. Vascular dementia without behavioral disturbance (HCC)  Comments:  she is back to her baseline since the UTI symptoms resolved

## 2021-09-25 RX ORDER — DIPHENOXYLATE HYDROCHLORIDE AND ATROPINE SULFATE 2.5; .025 MG/1; MG/1
1 TABLET ORAL 2 TIMES DAILY PRN
Qty: 30 TABLET | Refills: 0
Start: 2021-09-25 | End: 2021-11-21 | Stop reason: SDUPTHER

## 2021-10-08 DIAGNOSIS — N39.0 FREQUENT UTI: ICD-10-CM

## 2021-10-10 NOTE — TELEPHONE ENCOUNTER
See how she is doing on this medication I placed her on a trial of this medication due to freq UTIs

## 2021-10-12 RX ORDER — NITROFURANTOIN 25; 75 MG/1; MG/1
100 CAPSULE ORAL DAILY
Qty: 90 CAPSULE | Refills: 0 | Status: SHIPPED | OUTPATIENT
Start: 2021-10-12 | End: 2021-10-23 | Stop reason: SDUPTHER

## 2021-10-14 ENCOUNTER — OFFICE VISIT (OUTPATIENT)
Dept: FAMILY MEDICINE CLINIC | Facility: CLINIC | Age: 66
End: 2021-10-14

## 2021-10-14 VITALS
OXYGEN SATURATION: 100 % | TEMPERATURE: 97.7 F | SYSTOLIC BLOOD PRESSURE: 129 MMHG | BODY MASS INDEX: 26.69 KG/M2 | RESPIRATION RATE: 18 BRPM | HEART RATE: 71 BPM | WEIGHT: 180.2 LBS | HEIGHT: 69 IN | DIASTOLIC BLOOD PRESSURE: 54 MMHG

## 2021-10-14 DIAGNOSIS — Z23 NEED FOR INFLUENZA VACCINATION: ICD-10-CM

## 2021-10-14 DIAGNOSIS — G30.9 ALZHEIMER'S DEMENTIA WITHOUT BEHAVIORAL DISTURBANCE, UNSPECIFIED TIMING OF DEMENTIA ONSET: ICD-10-CM

## 2021-10-14 DIAGNOSIS — N32.81 OAB (OVERACTIVE BLADDER): Primary | ICD-10-CM

## 2021-10-14 DIAGNOSIS — N39.0 URINARY TRACT INFECTION WITHOUT HEMATURIA, SITE UNSPECIFIED: ICD-10-CM

## 2021-10-14 DIAGNOSIS — F02.80 ALZHEIMER'S DEMENTIA WITHOUT BEHAVIORAL DISTURBANCE, UNSPECIFIED TIMING OF DEMENTIA ONSET: ICD-10-CM

## 2021-10-14 PROCEDURE — 90662 IIV NO PRSV INCREASED AG IM: CPT | Performed by: NURSE PRACTITIONER

## 2021-10-14 PROCEDURE — 99213 OFFICE O/P EST LOW 20 MIN: CPT | Performed by: NURSE PRACTITIONER

## 2021-10-14 PROCEDURE — G0008 ADMIN INFLUENZA VIRUS VAC: HCPCS | Performed by: NURSE PRACTITIONER

## 2021-10-14 NOTE — PROGRESS NOTES
Chief Complaint  Follow-up (3 month) and Dementia    Subjective          Marissa Isabel Rob presents to [facility]  History of Present Illness    She is here today to follow up on freq UTIs-denies any urinary symptoms or acute confusion since being placed on Macrobid 100mg qd.     Her  reports when she was in the hospital she was c/o abd pain-she had a CT which was negative for acute infection. Her  states once he got her home he gave her stool softener to help her have a BM-reports the belly pain resolved after the BM. Denies abd pain since she is having a daily bm. She was having chronic diarrhea prior-last visit decreased her dose of lomotil from 2 tabs bid to 1 tab bid. She is having regular bm twice daily.     She has not been using the estridol patches -denies any hot flashes.     Request samples of Mybertriq.    She was prescribed bentyl for stomach spasms in the ER but she has not ever used it.  She was also prescribed pantoprazole but she only uses it prn.  Additionally she was prescribed Norvasc but she has not been taking it and her bp is WNL.     Alzheimers dementia-reports about a wk ago she woke up in the middle of the night unaware of where she is or who her  was-states it has occurred twice. She is having trouble remembering family or friends as well.   Someone mentioned getting pallative care but he does not want pallitve care or hospice.  States she is having difficulty with stairs. He has installed safety bars in the shower because she closes her eyes in the shower and looses her balance. Her  gets in the shower with her.  Her sister, daughter and one of her friends are going to start coming in to help to give him a break.     She  has a past medical history of Arthritis, Bladder problem, Cerebrovascular disease (06/11/2015), Dementia (HCC) (07/23/2018), Hepatitis A, Hypertension, Hypomagnesemia (01/11/2016), Hypothyroidism  "(05/09/2015), Leg pain, Memory loss (05/09/2015), Microhematuria, Muscle cramps, Obsessive compulsive disorder, Pap smear for cervical cancer screening, Postmenopausal syndrome (02/01/2017), Thyroid disease, Vascular dementia (HCC) (08/10/2015), Vascular disease, peripheral (HCC), and Vitamin D deficiency.     Objective   Vital Signs:   /54 (BP Location: Left arm, Patient Position: Sitting, Cuff Size: Adult)   Pulse 71   Temp 97.7 °F (36.5 °C) (Oral)   Resp 18   Ht 175.3 cm (69\")   Wt 81.7 kg (180 lb 3.2 oz)   SpO2 100% Comment: room air  BMI 26.61 kg/m²     Physical Exam  Constitutional:       Appearance: Normal appearance.   Neck:      Thyroid: No thyroid mass, thyromegaly or thyroid tenderness.   Cardiovascular:      Rate and Rhythm: Normal rate and regular rhythm.      Pulses: Normal pulses.      Heart sounds: Normal heart sounds.   Pulmonary:      Effort: Pulmonary effort is normal.      Breath sounds: Normal breath sounds.   Musculoskeletal:      Right lower leg: No edema.      Left lower leg: No edema.   Neurological:      Mental Status: She is alert.        Result Review :          Current Outpatient Medications:   •  diphenoxylate-atropine (LOMOTIL) 2.5-0.025 MG per tablet, Take 1 tablet by mouth 2 (Two) Times a Day As Needed for Diarrhea., Disp: 30 tablet, Rfl: 0  •  donepezil (ARICEPT) 23 MG tablet, Take 1 tablet by mouth Every Night., Disp: 90 tablet, Rfl: 1  •  estradiol (CLIMARA) 0.025 MG/24HR patch, Place 1 patch on the skin as directed by provider 1 (One) Time Per Week., Disp: , Rfl:   •  hydrOXYzine (ATARAX) 10 MG tablet, Take 1 tablet by mouth every night at bedtime., Disp: 90 tablet, Rfl: 1  •  levothyroxine (SYNTHROID, LEVOTHROID) 50 MCG tablet, Take 1 tablet by mouth Daily., Disp: 90 tablet, Rfl: 1  •  memantine (Namenda) 10 MG tablet, Take 1 tablet by mouth 2 (Two) Times a Day., Disp: 180 tablet, Rfl: 1  •  nitrofurantoin, macrocrystal-monohydrate, (Macrobid) 100 MG capsule, Take 1 " capsule by mouth Daily., Disp: 90 capsule, Rfl: 0  •  vitamin D (ERGOCALCIFEROL) 1.25 MG (39692 UT) capsule capsule, Take 1 capsule by mouth 1 (One) Time Per Week., Disp: 12 capsule, Rfl: 1  •  Mirabegron ER (Myrbetriq) 50 MG tablet sustained-release 24 hour 24 hr tablet, Take 50 mg by mouth Daily. Indications: 2 boxes (28 pills each) [id number] exp 11/22, Disp: 28 tablet, Rfl: 0  No current facility-administered medications for this visit.    Facility-Administered Medications Ordered in Other Visits:   •  sucralfate (CARAFATE) tablet 1 g, 1 g, Oral, Once, Carlo Dukes MD   Assessment and Plan    Diagnoses and all orders for this visit:    1. OAB (overactive bladder) (Primary)  Comments:  gave pt sample of Mybetriq  Orders:  -     Mirabegron ER (Myrbetriq) 50 MG tablet sustained-release 24 hour 24 hr tablet; Take 50 mg by mouth Daily. Indications: 2 boxes (28 pills each) [id number] exp 11/22  Dispense: 28 tablet; Refill: 0    2. Urinary tract infection without hematuria, site unspecified  Comments:  denies UTI symptoms since being started on maintenance Macrobid    3. Alzheimer's dementia without behavioral disturbance, unspecified timing of dementia onset (HCC)  Comments:  c/o waking up in the middle of the night confused -unaware of where she is or who her  is. Her  gave her one of his klonopin and it worked well.     4. Need for influenza vaccination  -     Fluzone High-Dose 65+yrs ([id number])        Follow Up   Return in about 3 months (around 1/14/2022).  Patient was given instructions and counseling regarding her condition or for health maintenance advice. Please see specific information pulled into the AVS if appropriate.     Marissa Hall   reports that she has never smoked. She has never used smokeless tobacco..             Rabia Logan, APRN

## 2021-10-23 DIAGNOSIS — N39.0 FREQUENT UTI: ICD-10-CM

## 2021-10-23 DIAGNOSIS — F02.80 ALZHEIMER'S DEMENTIA WITHOUT BEHAVIORAL DISTURBANCE, UNSPECIFIED TIMING OF DEMENTIA ONSET: ICD-10-CM

## 2021-10-23 DIAGNOSIS — G30.9 ALZHEIMER'S DEMENTIA WITHOUT BEHAVIORAL DISTURBANCE, UNSPECIFIED TIMING OF DEMENTIA ONSET: ICD-10-CM

## 2021-10-26 RX ORDER — NITROFURANTOIN 25; 75 MG/1; MG/1
100 CAPSULE ORAL DAILY
Qty: 90 CAPSULE | Refills: 0 | Status: SHIPPED | OUTPATIENT
Start: 2021-10-26 | End: 2022-04-26

## 2021-10-26 RX ORDER — DONEPEZIL HYDROCHLORIDE 23 MG/1
23 TABLET, FILM COATED ORAL NIGHTLY
Qty: 90 TABLET | Refills: 1 | Status: SHIPPED | OUTPATIENT
Start: 2021-10-26 | End: 2022-04-28 | Stop reason: SDUPTHER

## 2021-11-20 ENCOUNTER — HOSPITAL ENCOUNTER (EMERGENCY)
Facility: HOSPITAL | Age: 66
Discharge: HOME | End: 2021-11-20
Attending: EMERGENCY MEDICINE | Admitting: EMERGENCY MEDICINE

## 2021-11-20 ENCOUNTER — APPOINTMENT (OUTPATIENT)
Dept: GENERAL RADIOLOGY | Facility: HOSPITAL | Age: 66
End: 2021-11-20

## 2021-11-20 ENCOUNTER — APPOINTMENT (OUTPATIENT)
Dept: CT IMAGING | Facility: HOSPITAL | Age: 66
End: 2021-11-20

## 2021-11-20 VITALS
RESPIRATION RATE: 15 BRPM | HEIGHT: 68 IN | OXYGEN SATURATION: 98 % | BODY MASS INDEX: 27.89 KG/M2 | SYSTOLIC BLOOD PRESSURE: 141 MMHG | WEIGHT: 184 LBS | HEART RATE: 65 BPM | DIASTOLIC BLOOD PRESSURE: 81 MMHG | TEMPERATURE: 98.1 F

## 2021-11-20 DIAGNOSIS — M79.605 BILATERAL LEG PAIN: ICD-10-CM

## 2021-11-20 DIAGNOSIS — R51.9 ACUTE NONINTRACTABLE HEADACHE, UNSPECIFIED HEADACHE TYPE: Primary | ICD-10-CM

## 2021-11-20 DIAGNOSIS — M79.604 BILATERAL LEG PAIN: ICD-10-CM

## 2021-11-20 LAB
ALBUMIN SERPL-MCNC: 4.1 G/DL (ref 3.5–5.2)
ALBUMIN/GLOB SERPL: 1.3 G/DL
ALP SERPL-CCNC: 91 U/L (ref 39–117)
ALT SERPL W P-5'-P-CCNC: 15 U/L (ref 1–33)
ANION GAP SERPL CALCULATED.3IONS-SCNC: 14.2 MMOL/L (ref 5–15)
AST SERPL-CCNC: 20 U/L (ref 1–32)
BACTERIA UR QL AUTO: ABNORMAL /HPF
BASOPHILS # BLD AUTO: 0.06 10*3/MM3 (ref 0–0.2)
BASOPHILS NFR BLD AUTO: 0.8 % (ref 0–1.5)
BILIRUB SERPL-MCNC: 0.4 MG/DL (ref 0–1.2)
BILIRUB UR QL STRIP: NEGATIVE
BUN SERPL-MCNC: 15 MG/DL (ref 8–23)
BUN/CREAT SERPL: 13.9 (ref 7–25)
CALCIUM SPEC-SCNC: 9.4 MG/DL (ref 8.6–10.5)
CHLORIDE SERPL-SCNC: 100 MMOL/L (ref 98–107)
CLARITY UR: CLEAR
CO2 SERPL-SCNC: 25.8 MMOL/L (ref 22–29)
COLOR UR: YELLOW
CREAT SERPL-MCNC: 1.08 MG/DL (ref 0.57–1)
DEPRECATED RDW RBC AUTO: 46.1 FL (ref 37–54)
EOSINOPHIL # BLD AUTO: 0.17 10*3/MM3 (ref 0–0.4)
EOSINOPHIL NFR BLD AUTO: 2.4 % (ref 0.3–6.2)
ERYTHROCYTE [DISTWIDTH] IN BLOOD BY AUTOMATED COUNT: 13.2 % (ref 12.3–15.4)
GFR SERPL CREATININE-BSD FRML MDRD: 51 ML/MIN/1.73
GLOBULIN UR ELPH-MCNC: 3.2 GM/DL
GLUCOSE SERPL-MCNC: 134 MG/DL (ref 65–99)
GLUCOSE UR STRIP-MCNC: NEGATIVE MG/DL
HCT VFR BLD AUTO: 41.4 % (ref 34–46.6)
HGB BLD-MCNC: 13.7 G/DL (ref 12–15.9)
HGB UR QL STRIP.AUTO: NEGATIVE
HOLD SPECIMEN: NORMAL
HOLD SPECIMEN: NORMAL
HYALINE CASTS UR QL AUTO: ABNORMAL /LPF
IMM GRANULOCYTES # BLD AUTO: 0.05 10*3/MM3 (ref 0–0.05)
IMM GRANULOCYTES NFR BLD AUTO: 0.7 % (ref 0–0.5)
KETONES UR QL STRIP: NEGATIVE
LEUKOCYTE ESTERASE UR QL STRIP.AUTO: ABNORMAL
LYMPHOCYTES # BLD AUTO: 1.57 10*3/MM3 (ref 0.7–3.1)
LYMPHOCYTES NFR BLD AUTO: 21.9 % (ref 19.6–45.3)
MAGNESIUM SERPL-MCNC: 2 MG/DL (ref 1.6–2.4)
MCH RBC QN AUTO: 31.6 PG (ref 26.6–33)
MCHC RBC AUTO-ENTMCNC: 33.1 G/DL (ref 31.5–35.7)
MCV RBC AUTO: 95.4 FL (ref 79–97)
MONOCYTES # BLD AUTO: 0.51 10*3/MM3 (ref 0.1–0.9)
MONOCYTES NFR BLD AUTO: 7.1 % (ref 5–12)
NEUTROPHILS NFR BLD AUTO: 4.81 10*3/MM3 (ref 1.7–7)
NEUTROPHILS NFR BLD AUTO: 67.1 % (ref 42.7–76)
NITRITE UR QL STRIP: NEGATIVE
NRBC BLD AUTO-RTO: 0 /100 WBC (ref 0–0.2)
PH UR STRIP.AUTO: 7 [PH] (ref 5–8)
PLATELET # BLD AUTO: 257 10*3/MM3 (ref 140–450)
PMV BLD AUTO: 9 FL (ref 6–12)
POTASSIUM SERPL-SCNC: 4.2 MMOL/L (ref 3.5–5.2)
PROT SERPL-MCNC: 7.3 G/DL (ref 6–8.5)
PROT UR QL STRIP: NEGATIVE
RBC # BLD AUTO: 4.34 10*6/MM3 (ref 3.77–5.28)
RBC # UR STRIP: ABNORMAL /HPF
REF LAB TEST METHOD: ABNORMAL
SODIUM SERPL-SCNC: 140 MMOL/L (ref 136–145)
SP GR UR STRIP: 1.02 (ref 1–1.03)
SQUAMOUS #/AREA URNS HPF: ABNORMAL /HPF
TROPONIN T SERPL-MCNC: <0.01 NG/ML (ref 0–0.03)
UROBILINOGEN UR QL STRIP: ABNORMAL
WBC # UR STRIP: ABNORMAL /HPF
WBC NRBC COR # BLD: 7.17 10*3/MM3 (ref 3.4–10.8)
WHOLE BLOOD HOLD SPECIMEN: NORMAL
WHOLE BLOOD HOLD SPECIMEN: NORMAL

## 2021-11-20 PROCEDURE — 81001 URINALYSIS AUTO W/SCOPE: CPT | Performed by: EMERGENCY MEDICINE

## 2021-11-20 PROCEDURE — 70450 CT HEAD/BRAIN W/O DYE: CPT

## 2021-11-20 PROCEDURE — 80053 COMPREHEN METABOLIC PANEL: CPT | Performed by: EMERGENCY MEDICINE

## 2021-11-20 PROCEDURE — P9612 CATHETERIZE FOR URINE SPEC: HCPCS

## 2021-11-20 PROCEDURE — 85025 COMPLETE CBC W/AUTO DIFF WBC: CPT | Performed by: EMERGENCY MEDICINE

## 2021-11-20 PROCEDURE — 71045 X-RAY EXAM CHEST 1 VIEW: CPT

## 2021-11-20 PROCEDURE — 93010 ELECTROCARDIOGRAM REPORT: CPT | Performed by: INTERNAL MEDICINE

## 2021-11-20 PROCEDURE — 84484 ASSAY OF TROPONIN QUANT: CPT | Performed by: EMERGENCY MEDICINE

## 2021-11-20 PROCEDURE — 72100 X-RAY EXAM L-S SPINE 2/3 VWS: CPT

## 2021-11-20 PROCEDURE — 36415 COLL VENOUS BLD VENIPUNCTURE: CPT | Performed by: EMERGENCY MEDICINE

## 2021-11-20 PROCEDURE — 99283 EMERGENCY DEPT VISIT LOW MDM: CPT

## 2021-11-20 PROCEDURE — 83735 ASSAY OF MAGNESIUM: CPT | Performed by: EMERGENCY MEDICINE

## 2021-11-20 PROCEDURE — 93005 ELECTROCARDIOGRAM TRACING: CPT | Performed by: EMERGENCY MEDICINE

## 2021-11-20 PROCEDURE — 93005 ELECTROCARDIOGRAM TRACING: CPT

## 2021-11-20 RX ORDER — SODIUM CHLORIDE 0.9 % (FLUSH) 0.9 %
10 SYRINGE (ML) INJECTION AS NEEDED
Status: DISCONTINUED | OUTPATIENT
Start: 2021-11-20 | End: 2021-11-20 | Stop reason: HOSPADM

## 2021-11-20 RX ORDER — HYDROCODONE BITARTRATE AND ACETAMINOPHEN 5; 325 MG/1; MG/1
1 TABLET ORAL EVERY 6 HOURS PRN
Status: DISCONTINUED | OUTPATIENT
Start: 2021-11-20 | End: 2021-11-20 | Stop reason: HOSPADM

## 2021-11-20 RX ADMIN — HYDROCODONE BITARTRATE AND ACETAMINOPHEN 1 TABLET: 5; 325 TABLET ORAL at 16:42

## 2021-11-20 NOTE — ED PROVIDER NOTES
Time: 4:08 PM EST  Arrived by: private car  Chief Complaint: Headache  History provided by: Patient, pt's   History is limited by: Dementia     History of Present Illness:  Patient is a 66 y.o. year old female that presents to the emergency department with a sudden mild-to-moderate headache that began one hour ago. She has had bilateral lower extremity pain but no fever, cough, vomiting, or diarrhea. Yesterday, the patient did not have any complaints. She had two tylenol two hours ago. The patient last had a UTI two months ago and is on a preventative antibiotics.     The patient has dementia and is alert and oriented to self only at baseline. Pt had a history of shingles but has since had the shingles vaccine.    HPI    Patient Care Team  Primary Care Provider: Rabia Logan APRN    Past Medical History:     No Known Allergies  No past medical history on file.  No past surgical history on file.  No family history on file.    Home Medications:  Prior to Admission medications    Not on File        Social History:   Social History     Tobacco Use   • Smoking status: Not on file   • Smokeless tobacco: Not on file   Substance Use Topics   • Alcohol use: Not on file   • Drug use: Not on file     Recent travel: not applicable     Review of Systems:  Review of Systems   Constitutional: Negative for chills and fever.   HENT: Negative for congestion, ear pain and sore throat.    Eyes: Negative for pain.   Respiratory: Negative for cough, chest tightness and shortness of breath.    Cardiovascular: Negative for chest pain.   Gastrointestinal: Negative for abdominal pain, diarrhea, nausea and vomiting.   Genitourinary: Negative for flank pain and hematuria.   Musculoskeletal: Negative for joint swelling.        Bilateral leg pain   Skin: Negative for pallor.   Neurological: Positive for headaches. Negative for seizures.   All other systems reviewed and are negative.       Physical Exam:  /80   Pulse 65   " Temp 98.1 °F (36.7 °C)   Resp 15   Ht 172.7 cm (68\")   Wt 83.5 kg (184 lb)   SpO2 98%   BMI 27.98 kg/m²     Physical Exam  Vitals and nursing note reviewed.   Constitutional:       General: She is not in acute distress.     Appearance: Normal appearance. She is not toxic-appearing.   HENT:      Head: Normocephalic and atraumatic.      Mouth/Throat:      Mouth: Mucous membranes are moist.   Eyes:      General: No scleral icterus.  Cardiovascular:      Rate and Rhythm: Normal rate and regular rhythm.      Pulses: Normal pulses.      Heart sounds: Normal heart sounds.   Pulmonary:      Effort: Pulmonary effort is normal. No respiratory distress.      Breath sounds: Normal breath sounds.   Abdominal:      General: Abdomen is flat.      Palpations: Abdomen is soft.      Tenderness: There is no abdominal tenderness.   Musculoskeletal:         General: Normal range of motion.      Cervical back: Normal range of motion and neck supple.      Right lower leg: No edema.      Left lower leg: No edema.   Skin:     General: Skin is warm and dry.      Findings: No rash (scalp ).   Neurological:      Mental Status: She is alert. Mental status is at baseline.      Comments: Oriented x1.                Medications in the Emergency Department:  Medications   sodium chloride 0.9 % flush 10 mL (has no administration in time range)   HYDROcodone-acetaminophen (NORCO) 5-325 MG per tablet 1 tablet (1 tablet Oral Given 11/20/21 1642)        Labs  Lab Results (last 24 hours)     Procedure Component Value Units Date/Time    CBC & Differential [[id number]]  (Abnormal) Collected: 11/20/21 1531    Specimen: Blood Updated: 11/20/21 0647    Narrative:      The following orders were created for panel order CBC & Differential.  Procedure                               Abnormality         Status                     ---------                               -----------         ------                     CBC Auto Differential[[id number]]        " Abnormal            Final result                 Please view results for these tests on the individual orders.    Comprehensive Metabolic Panel [[id number]]  (Abnormal) Collected: 11/20/21 1531    Specimen: Blood Updated: 11/20/21 1625     Glucose 134 mg/dL      BUN 15 mg/dL      Creatinine 1.08 mg/dL      Sodium 140 mmol/L      Potassium 4.2 mmol/L      Chloride 100 mmol/L      CO2 25.8 mmol/L      Calcium 9.4 mg/dL      Total Protein 7.3 g/dL      Albumin 4.10 g/dL      ALT (SGPT) 15 U/L      AST (SGOT) 20 U/L      Alkaline Phosphatase 91 U/L      Total Bilirubin 0.4 mg/dL      eGFR Non African Amer 51 mL/min/1.73      Globulin 3.2 gm/dL      A/G Ratio 1.3 g/dL      BUN/Creatinine Ratio 13.9     Anion Gap 14.2 mmol/L     Narrative:      GFR Normal >60  Chronic Kidney Disease <60  Kidney Failure <15      Troponin [[id number]]  (Normal) Collected: 11/20/21 1531    Specimen: Blood Updated: 11/20/21 1621     Troponin T <0.010 ng/mL     Narrative:      Troponin T Reference Range:  <= 0.03 ng/mL-   Negative for AMI  >0.03 ng/mL-     Abnormal for myocardial necrosis.  Clinicians would have to utilize clinical acumen, EKG, Troponin and serial changes to determine if it is an Acute Myocardial Infarction or myocardial injury due to an underlying chronic condition.       Results may be falsely decreased if patient taking Biotin.      Magnesium [[id number]]  (Normal) Collected: 11/20/21 1531    Specimen: Blood Updated: 11/20/21 1624     Magnesium 2.0 mg/dL     CBC Auto Differential [[id number]]  (Abnormal) Collected: 11/20/21 1531    Specimen: Blood Updated: 11/20/21 1557     WBC 7.17 10*3/mm3      RBC 4.34 10*6/mm3      Hemoglobin 13.7 g/dL      Hematocrit 41.4 %      MCV 95.4 fL      MCH 31.6 pg      MCHC 33.1 g/dL      RDW 13.2 %      RDW-SD 46.1 fl      MPV 9.0 fL      Platelets 257 10*3/mm3      Neutrophil % 67.1 %      Lymphocyte % 21.9 %      Monocyte % 7.1 %      Eosinophil % 2.4 %      Basophil % 0.8 %      Immature  Grans % 0.7 %      Neutrophils, Absolute 4.81 10*3/mm3      Lymphocytes, Absolute 1.57 10*3/mm3      Monocytes, Absolute 0.51 10*3/mm3      Eosinophils, Absolute 0.17 10*3/mm3      Basophils, Absolute 0.06 10*3/mm3      Immature Grans, Absolute 0.05 10*3/mm3      nRBC 0.0 /100 WBC     Urinalysis With Culture If Indicated - Urine, Catheter [[id number]]  (Abnormal) Collected: 11/20/21 1630    Specimen: Urine, Catheter Updated: 11/20/21 1709     Color, UA Yellow     Appearance, UA Clear     pH, UA 7.0     Specific Gravity, UA 1.016     Glucose, UA Negative     Ketones, UA Negative     Bilirubin, UA Negative     Blood, UA Negative     Protein, UA Negative     Leuk Esterase, UA Trace     Nitrite, UA Negative     Urobilinogen, UA 0.2 E.U./dL    Urinalysis, Microscopic Only - Urine, Catheter [[id number]]  (Abnormal) Collected: 11/20/21 1630    Specimen: Urine, Catheter Updated: 11/20/21 1709     RBC, UA 0-2 /HPF      WBC, UA 3-5 /HPF      Bacteria, UA None Seen /HPF      Squamous Epithelial Cells, UA 0-2 /HPF      Hyaline Casts, UA None Seen /LPF      Methodology Automated Microscopy           Imaging:  CT Head Without Contrast    Result Date: 11/20/2021  PROCEDURE: CT HEAD WO CONTRAST  COMPARISON:  [facility], CT, CT HEAD WO CONTRAST, 9/18/2021, 15:42. INDICATIONS: headache  PROTOCOL:   Standard imaging protocol performed    RADIATION:   DLP: 953.8 mGy*cm   MA and/or KV was adjusted to minimize radiation dose.     TECHNIQUE: After obtaining the patient's consent, CT images were obtained without non-ionic intravenous contrast material.  FINDINGS:  There is mild to moderate diffuse cerebral and cerebellar atrophy.  No acute intracranial hemorrhage is seen.  No acute infarction or mass is evident.  The ventricles are normal in size and configuration given the degree of atrophy.  No focal calvarial abnormality is seen.  The mastoid air cells are clear.  CONCLUSION:  1. No acute intracranial abnormality     Piyush  AYO Mirza       Electronically Signed and Approved By: Piyush Mirza M.D. on 11/20/2021 at 17:46             XR Chest 1 View    Result Date: 11/20/2021  PROCEDURE: XR CHEST 1 VW  COMPARISON: [facility], CR, XR CHEST 1 VW, 9/07/2021, 14:37.  [facility], CT, CT ABDOMEN PELVIS WO CONTRAST, 9/18/2021, 20:45.  [facility], CR, XR CHEST 1 VW, 9/20/2021, 16:10.  INDICATIONS: Weak/Dizzy/AMS triage protocol  FINDINGS:  A faint density in the mid right lung measuring 0.6 cm is noted, more prominent in the interval.  Mild irregular density in the left lung base likely represent scarring or atelectasis.  The cardiac and mediastinal silhouettes appear normal.  No effusion is seen.  CONCLUSION:  1. Mild left basilar scarring or atelectasis 2. Minimal irregular density in the mid right lung may represent a minimal infiltrate or atelectasis.       Piyush Mirza M.D.       Electronically Signed and Approved By: Piyush Mirza M.D. on 11/20/2021 at 16:19             XR Spine Lumbar AP & Lateral    Result Date: 11/20/2021  PROCEDURE: XR SPINE LUMBAR AP AND LATERAL  COMPARISON: None  INDICATIONS: leg pain/LOWER BACK PAIN  FINDINGS:  There is a 0.4 cm anterolisthesis of L3 on L4 and 0.3 cm anterolisthesis of L4 on L5.  No fracture is seen.  Mild multilevel degenerative disc changes are present.  CONCLUSION:  1. Malalignments of the lower and mid lumbar spine 2. No acute fracture 3. Mild multilevel degenerative disc disease      Piyush Mirza M.D.       Electronically Signed and Approved By: Piyush Mirza M.D. on 11/20/2021 at 18:50               Procedures:  Procedures    Progress  ED Course as of 11/20/21 1942   Sat Nov 20, 2021   1632 EKG: EKG interpreted by independently by Dr. Ferreira shows a NSR with a rate of 58. Nml p-waves. LAD. Nml QRS. Non-specific ST changes. Nml QT.    [LG]      ED Course User Index  [LG] Loree Fuentes                            Medical Decision  Making:  MDM  Number of Diagnoses or Management Options  Acute nonintractable headache, unspecified headache type  Bilateral leg pain  Diagnosis management comments: Patient presents complaint of headache and bilateral lower extremity discomfort.  Old records reviewed she has had prior visits related to metabolic encephalopathy.  With respect to her headache differential diagnostic considerations include stroke versus cephalgia versus meningitis.  Is been no fever or neck discomfort to suggest meningitis.  Chemistries unremarkable CBC unremarkable urinalysis negative for bacteriuria with urine culture ordered and pending.  X-rays of the lumbar spine did not demonstrate acute fractures as a source of the patient's lower extremity pain.  CT scan of the brain is read by radiology and reviewed by me does not demonstrate acute intracranial findings as a source for the patient's headache.  ED course she received hydrocodone tablet and had symptomatic improvement was discharged stable with close outpatient follow-up with her PCP recommended.  There are no signs or findings today of any imminent emergent or life-threatening underlying cause for the patient's symptoms and she is stable and appears comfortable at time of discharge.       Amount and/or Complexity of Data Reviewed  Clinical lab tests: reviewed  Tests in the radiology section of CPT®: reviewed  Tests in the medicine section of CPT®: reviewed    Risk of Complications, Morbidity, and/or Mortality  Presenting problems: high  Management options: high    Patient Progress  Patient progress: improved       Final diagnoses:   Acute nonintractable headache, unspecified headache type   Bilateral leg pain        Disposition:  ED Disposition     ED Disposition Condition Comment    Discharge Stable           This medical record created using voice recognition software and may contain unintended errors.    Documentation assistance provided by Loree Fuentes acting as scribe  for Maurice Ferreira MD. Information recorded by the scribe was done at my direction and has been verified and validated by me.        Loree Fuentes  11/20/21 1614       Loree Fuentes  11/20/21 1642       Maurice Ferreira MD  11/20/21 1942

## 2021-11-21 DIAGNOSIS — K59.03 DRUG-INDUCED CONSTIPATION: ICD-10-CM

## 2021-11-21 LAB — QT INTERVAL: 449 MS

## 2021-11-24 ENCOUNTER — TELEPHONE (OUTPATIENT)
Dept: FAMILY MEDICINE CLINIC | Facility: CLINIC | Age: 66
End: 2021-11-24

## 2021-11-24 DIAGNOSIS — K59.03 DRUG-INDUCED CONSTIPATION: ICD-10-CM

## 2021-11-24 RX ORDER — DIPHENOXYLATE HYDROCHLORIDE AND ATROPINE SULFATE 2.5; .025 MG/1; MG/1
1 TABLET ORAL 2 TIMES DAILY PRN
Qty: 60 TABLET | Refills: 0 | Status: SHIPPED | OUTPATIENT
Start: 2021-11-24 | End: 2021-12-13 | Stop reason: SDUPTHER

## 2021-11-24 RX ORDER — DIPHENOXYLATE HYDROCHLORIDE AND ATROPINE SULFATE 2.5; .025 MG/1; MG/1
1 TABLET ORAL 2 TIMES DAILY PRN
Qty: 30 TABLET | Refills: 0
Start: 2021-11-24 | End: 2021-11-24 | Stop reason: SDUPTHER

## 2021-11-24 NOTE — TELEPHONE ENCOUNTER
Can we find her last UDS-I searched her chart and could not find it-if due bring her in for UDS prior to fill

## 2021-11-29 ENCOUNTER — CLINICAL SUPPORT (OUTPATIENT)
Dept: FAMILY MEDICINE CLINIC | Facility: CLINIC | Age: 66
End: 2021-11-29

## 2021-11-29 DIAGNOSIS — Z79.899 MEDICATION MANAGEMENT: ICD-10-CM

## 2021-11-29 DIAGNOSIS — Z79.899 LONG TERM USE OF DRUG: Primary | ICD-10-CM

## 2021-11-29 NOTE — PROGRESS NOTES
Patient came into office today to leave a UDS. Patients  accompanied her. She was unable to complete the UDS after two separate attempts coming into the office today. The first she had a bowel movement and the second she missed the hat.      asked if he could just take the cup home with them as they had done that before and I told him that we are unable to do that. I also informed him that he is not able to go in the bathroom with her, since she does require assistance then a staff member has to go in with her.

## 2021-11-29 NOTE — PROGRESS NOTES
We can do a serum drug screen to avoid this from happening again-she does suffer from dementia-please place order for serum drug screen

## 2021-12-01 DIAGNOSIS — Z79.899 LONG TERM USE OF DRUG: Primary | ICD-10-CM

## 2021-12-07 ENCOUNTER — OFFICE VISIT (OUTPATIENT)
Dept: FAMILY MEDICINE CLINIC | Facility: CLINIC | Age: 66
End: 2021-12-07

## 2021-12-07 ENCOUNTER — LAB (OUTPATIENT)
Dept: LAB | Facility: HOSPITAL | Age: 66
End: 2021-12-07

## 2021-12-07 VITALS
HEIGHT: 69 IN | HEART RATE: 76 BPM | DIASTOLIC BLOOD PRESSURE: 80 MMHG | SYSTOLIC BLOOD PRESSURE: 138 MMHG | BODY MASS INDEX: 26.51 KG/M2 | OXYGEN SATURATION: 98 % | WEIGHT: 179 LBS

## 2021-12-07 DIAGNOSIS — Z79.899 LONG TERM USE OF DRUG: ICD-10-CM

## 2021-12-07 DIAGNOSIS — Z00.00 ENCOUNTER FOR ANNUAL WELLNESS EXAM IN MEDICARE PATIENT: Primary | ICD-10-CM

## 2021-12-07 PROCEDURE — 1126F AMNT PAIN NOTED NONE PRSNT: CPT | Performed by: NURSE PRACTITIONER

## 2021-12-07 PROCEDURE — 80307 DRUG TEST PRSMV CHEM ANLYZR: CPT

## 2021-12-07 PROCEDURE — 1159F MED LIST DOCD IN RCRD: CPT | Performed by: NURSE PRACTITIONER

## 2021-12-07 PROCEDURE — G0439 PPPS, SUBSEQ VISIT: HCPCS | Performed by: NURSE PRACTITIONER

## 2021-12-07 PROCEDURE — 36415 COLL VENOUS BLD VENIPUNCTURE: CPT

## 2021-12-07 PROCEDURE — 1170F FXNL STATUS ASSESSED: CPT | Performed by: NURSE PRACTITIONER

## 2021-12-07 NOTE — PROGRESS NOTES
The ABCs of the Annual Wellness Visit  Subsequent Medicare Wellness Visit    Chief Complaint   Patient presents with   • Medicare Wellness-subsequent      Subjective    History of Present Illness:  Marissa Rob is a 66 y.o. female who presents for a Subsequent Medicare Wellness Visit.    The following portions of the patient's history were reviewed and   updated as appropriate: allergies, current medications, past family history, past medical history, past social history, past surgical history and problem list.    Compared to one year ago, the patient feels her physical   health is the same.    Compared to one year ago, the patient feels her mental   health is worse.    Recent Hospitalizations:  This patient has had a [facility] admission record on file within the last 365 days.    Current Medical Providers:  Patient Care Team:  Rabia Logan APRN as PCP - General (Nurse Practitioner)  Rabia Logan APRN (Nurse Practitioner)    Outpatient Medications Prior to Visit   Medication Sig Dispense Refill   • diphenoxylate-atropine (LOMOTIL) 2.5-0.025 MG per tablet Take 1 tablet by mouth 2 (Two) Times a Day As Needed for Diarrhea. 60 tablet 0   • donepezil (ARICEPT) 23 MG tablet Take 1 tablet by mouth Every Night. 90 tablet 1   • estradiol (CLIMARA) 0.025 MG/24HR patch Place 1 patch on the skin as directed by provider 1 (One) Time Per Week.     • hydrOXYzine (ATARAX) 10 MG tablet Take 1 tablet by mouth every night at bedtime. 90 tablet 1   • levothyroxine (SYNTHROID, LEVOTHROID) 50 MCG tablet Take 1 tablet by mouth Daily. 90 tablet 1   • memantine (Namenda) 10 MG tablet Take 1 tablet by mouth 2 (Two) Times a Day. 180 tablet 1   • Mirabegron ER (Myrbetriq) 50 MG tablet sustained-release 24 hour 24 hr tablet Take 50 mg by mouth Daily. Indications: 2 boxes (28 pills each) [id number] exp 11/22 28 tablet 0   • nitrofurantoin, macrocrystal-monohydrate, (Macrobid) 100 MG capsule Take 1 capsule by  "mouth Daily. 90 capsule 0   • vitamin D (ERGOCALCIFEROL) 1.25 MG (85448 UT) capsule capsule Take 1 capsule by mouth 1 (One) Time Per Week. 12 capsule 1     Facility-Administered Medications Prior to Visit   Medication Dose Route Frequency Provider Last Rate Last Admin   • sucralfate (CARAFATE) tablet 1 g  1 g Oral Once Carlo Dukes MD           No opioid medication identified on active medication list. I have reviewed chart for other potential  high risk medication/s and harmful drug interactions in the elderly.          Aspirin is not on active medication list.  Aspirin use is not indicated based on review of current medical condition/s. Risk of harm outweighs potential benefits.  .    Patient Active Problem List   Diagnosis   • Arthritis   • Dementia (HCC)   • Hepatitis A   • Cerebrovascular disease   • Bladder problem   • Hypertension   • Vascular disease, peripheral (HCC)   • Memory loss   • Microhematuria   • Obsessive-compulsive disorder   • Postmenopausal syndrome   • Hypothyroidism   • Vascular dementia (HCC)   • Thyroid disease   • Vitamin D deficiency   • UTI (urinary tract infection)   • AMS (altered mental status)     Advance Care Planning  Advance Directive is not on file.  ACP discussion was held with the patient during this visit. Patient does not have an advance directive, declines further assistance.          Objective    Vitals:    12/07/21 1307   BP: 138/80   BP Location: Left arm   Patient Position: Sitting   Cuff Size: Adult   Pulse: 76   SpO2: 98%   Weight: 81.2 kg (179 lb)   Height: 175.3 cm (69\")   PainSc: 0-No pain     BMI Readings from Last 1 Encounters:   12/07/21 26.43 kg/m²   BMI is above normal parameters. Recommendations include: nutrition counseling    Does the patient have evidence of cognitive impairment? Yes    Physical Exam            HEALTH RISK ASSESSMENT    Smoking Status:  Social History     Tobacco Use   Smoking Status Never Smoker   Smokeless Tobacco Never Used "     Alcohol Consumption:  Social History     Substance and Sexual Activity   Alcohol Use Never    Comment: does not drink     Fall Risk Screen:    DEANDRE Fall Risk Assessment was completed, and patient is at HIGH risk for falls. Assessment completed on:12/7/2021    Depression Screening:  PHQ-2/PHQ-9 Depression Screening 12/7/2021   Little interest or pleasure in doing things 2   Feeling down, depressed, or hopeless 0   Trouble falling or staying asleep, or sleeping too much 0   Feeling tired or having little energy 1   Poor appetite or overeating 0   Feeling bad about yourself - or that you are a failure or have let yourself or your family down 0   Trouble concentrating on things, such as reading the newspaper or watching television 0   Moving or speaking so slowly that other people could have noticed. Or the opposite - being so fidgety or restless that you have been moving around a lot more than usual 0   Thoughts that you would be better off dead, or of hurting yourself in some way 0   Total Score 3   If you checked off any problems, how difficult have these problems made it for you to do your work, take care of things at home, or get along with other people? Very difficult       Health Habits and Functional and Cognitive Screening:  Functional & Cognitive Status 12/7/2021   Do you have difficulty preparing food and eating? Yes   Do you have difficulty bathing yourself, getting dressed or grooming yourself? Yes   Do you have difficulty using the toilet? Yes   Do you have difficulty moving around from place to place? Yes   Do you have trouble with steps or getting out of a bed or a chair? Yes   Current Diet Well Balanced Diet   Dental Exam Up to date   Eye Exam Up to date   Exercise (times per week) 7 times per week   Current Exercises Include Walking   Do you need help using the phone?  Yes   Are you deaf or do you have serious difficulty hearing?  No   Do you need help with transportation? Yes   Do you need help  shopping? Yes   Do you need help preparing meals?  Yes   Do you need help with housework?  Yes   Do you need help with laundry? Yes   Do you need help taking your medications? Yes   Do you need help managing money? Yes   Do you ever drive or ride in a car without wearing a seat belt? No   Have you felt unusual stress, anger or loneliness in the last month? No   Who do you live with? Spouse   If you need help, do you have trouble finding someone available to you? No   Have you been bothered in the last four weeks by sexual problems? No   Do you have difficulty concentrating, remembering or making decisions? Yes       Age-appropriate Screening Schedule:  Refer to the list below for future screening recommendations based on patient's age, sex and/or medical conditions. Orders for these recommended tests are listed in the plan section. The patient has been provided with a written plan.    Health Maintenance   Topic Date Due   • TDAP/TD VACCINES (1 - Tdap) Never done   • ZOSTER VACCINE (1 of 2) Never done   • DXA SCAN  12/07/2021 (Originally 1955)   • MAMMOGRAM  07/09/2022 (Originally 3/22/2020)   • INFLUENZA VACCINE  Completed              Assessment/Plan   CMS Preventative Services Quick Reference  Risk Factors Identified During Encounter  Dementia/Memory   Immunizations Discussed/Encouraged (specific Immunizations; Tdap and Shingrix  The above risks/problems have been discussed with the patient.  Follow up actions/plans if indicated are seen below in the Assessment/Plan Section.  Pertinent information has been shared with the patient in the After Visit Summary.    Diagnoses and all orders for this visit:    1. Encounter for annual wellness exam in Medicare patient (Primary)        Follow Up:   Return if symptoms worsen or fail to improve.     An After Visit Summary and PPPS were made available to the patient.        I spent 15 minutes caring for Marissa on this date of service. This time includes time spent by  me in the following activities:preparing for the visit, reviewing tests, obtaining and/or reviewing a separately obtained history, counseling and educating the patient/family/caregiver and documenting information in the medical record

## 2021-12-11 LAB
AMPHETAMINES SERPL QL SCN: NEGATIVE NG/ML
BARBITURATES SERPL QL SCN: NEGATIVE UG/ML
BENZODIAZ SERPL QL SCN: NEGATIVE NG/ML
CANNABINOIDS SERPL QL SCN: NEGATIVE NG/ML
COCAINE+BZE SERPL QL SCN: NEGATIVE NG/ML
METHADONE SERPL QL SCN: NEGATIVE NG/ML
OPIATES SERPL QL SCN: NEGATIVE NG/ML
OXYCODONE+OXYMORPHONE SERPLBLD QL SCN: NEGATIVE NG/ML
PCP SERPL QL SCN: NEGATIVE NG/ML
PROPOXYPH SERPL QL SCN: NEGATIVE NG/ML

## 2021-12-13 DIAGNOSIS — K59.03 DRUG-INDUCED CONSTIPATION: ICD-10-CM

## 2021-12-13 RX ORDER — DIPHENOXYLATE HYDROCHLORIDE AND ATROPINE SULFATE 2.5; .025 MG/1; MG/1
1 TABLET ORAL 2 TIMES DAILY PRN
Qty: 60 TABLET | Refills: 2 | Status: SHIPPED | OUTPATIENT
Start: 2021-12-13 | End: 2022-04-26

## 2022-01-25 ENCOUNTER — OFFICE VISIT (OUTPATIENT)
Dept: FAMILY MEDICINE CLINIC | Facility: CLINIC | Age: 67
End: 2022-01-25

## 2022-01-25 VITALS
BODY MASS INDEX: 27.3 KG/M2 | HEART RATE: 63 BPM | SYSTOLIC BLOOD PRESSURE: 129 MMHG | DIASTOLIC BLOOD PRESSURE: 55 MMHG | HEIGHT: 69 IN | WEIGHT: 184.3 LBS | OXYGEN SATURATION: 97 %

## 2022-01-25 DIAGNOSIS — F41.9 ANXIETY: ICD-10-CM

## 2022-01-25 DIAGNOSIS — F02.80 LATE ONSET ALZHEIMER'S DEMENTIA WITHOUT BEHAVIORAL DISTURBANCE: ICD-10-CM

## 2022-01-25 DIAGNOSIS — G30.1 LATE ONSET ALZHEIMER'S DEMENTIA WITHOUT BEHAVIORAL DISTURBANCE: ICD-10-CM

## 2022-01-25 DIAGNOSIS — H61.23 BILATERAL IMPACTED CERUMEN: Primary | ICD-10-CM

## 2022-01-25 DIAGNOSIS — N32.81 OAB (OVERACTIVE BLADDER): ICD-10-CM

## 2022-01-25 DIAGNOSIS — E03.9 HYPOTHYROIDISM, UNSPECIFIED TYPE: ICD-10-CM

## 2022-01-25 DIAGNOSIS — K59.03 DRUG-INDUCED CONSTIPATION: ICD-10-CM

## 2022-01-25 PROCEDURE — 99214 OFFICE O/P EST MOD 30 MIN: CPT | Performed by: NURSE PRACTITIONER

## 2022-01-25 PROCEDURE — 69209 REMOVE IMPACTED EAR WAX UNI: CPT | Performed by: NURSE PRACTITIONER

## 2022-01-25 RX ORDER — LEVOTHYROXINE SODIUM 0.05 MG/1
50 TABLET ORAL DAILY
Qty: 90 TABLET | Refills: 1 | Status: SHIPPED | OUTPATIENT
Start: 2022-01-25

## 2022-01-25 RX ORDER — HYDROXYZINE HYDROCHLORIDE 10 MG/1
10 TABLET, FILM COATED ORAL
Qty: 90 TABLET | Refills: 1 | Status: SHIPPED | OUTPATIENT
Start: 2022-01-25 | End: 2022-01-30 | Stop reason: SDUPTHER

## 2022-01-25 RX ORDER — DIPHENOXYLATE HYDROCHLORIDE AND ATROPINE SULFATE 2.5; .025 MG/1; MG/1
1 TABLET ORAL 2 TIMES DAILY PRN
Qty: 60 TABLET | Refills: 2 | Status: CANCELLED | OUTPATIENT
Start: 2022-01-25

## 2022-01-25 NOTE — PROGRESS NOTES
Chief Complaint  Anxiety and Alzheimer's Disease    Subjective          Marissa Isabel Rob presents to [facility]  History of Present Illness     She is requesting a refill of lomotil, atarax, synthroid. Reviewed lomotil script not due at this time-it was filled in Dec with 2 refills.     Request samples of myrbetriq-samples given.    She had both cataracts removed by  12/21.     Diarrhea -her  decreased her dose of lomotil to one daily due to constipation. Reports her symptoms are under good control on the once daily lomotil.     Alzheimers Dementia- reports her dementia is worsening-he is now having to shower with her due to inability to shower self. States she defecates in the shower at times. States she now requires assistance with all ADLs. He tries to make sure her hair, dress and nails are that of if she was still able to do it herself. States she is unaware of hunger or thirst-he makes sure she is eating and drinking well. She has trouble remembering her kids, grandkids, friends and at times her . She is unsure of who I am today even though we have known each other for 20 years-she knows my face looks familiar but she cannot place my name. She is very dependent on her  Jorge and he states she stays within 3 feet of him at all times. Jorge reports his daughter who is a home health nurse is coming down a couple days per wk to help. States it is very difficult for him to find time for himself due to caring for his wife-he is trying to get a sitter to sit with her for a couple days week so he can get out a little-he would love to go fishing. Reports the last 3 yrs have been difficult as her dementia has gotten progressively worse. States he does not want to bring in Hospice or place her in a nursing home-states   he would like to be able to take care of her at home for the rest of her days and have her continue to see me.    Anxiety-reports  "her anxiety is bad at times-he would like to have something to give in to help her anxiety as needed-increased her dose of hydroxyzine from qd to bid.     She  has a past medical history of Arthritis, Bladder problem, Cerebrovascular disease (06/11/2015), Dementia (HCC) (07/23/2018), Hepatitis A, Hypertension, Hypomagnesemia (01/11/2016), Hypothyroidism (05/09/2015), Leg pain, Memory loss (05/09/2015), Microhematuria, Muscle cramps, Obsessive compulsive disorder, Pap smear for cervical cancer screening, Postmenopausal syndrome (02/01/2017), Thyroid disease, Vascular dementia (HCC) (08/10/2015), Vascular disease, peripheral (HCC), and Vitamin D deficiency.     Objective   Vital Signs:   /55 (BP Location: Left arm, Patient Position: Sitting, Cuff Size: Adult)   Pulse 63   Ht 175.3 cm (69\")   Wt 83.6 kg (184 lb 4.8 oz)   SpO2 97%   BMI 27.22 kg/m²     Physical Exam  Constitutional:       Appearance: Normal appearance.   HENT:      Right Ear: Hearing, ear canal and external ear normal. There is impacted cerumen.      Left Ear: Hearing, ear canal and external ear normal. There is impacted cerumen.   Neck:      Thyroid: No thyroid mass, thyromegaly or thyroid tenderness.      Vascular: No carotid bruit.   Cardiovascular:      Rate and Rhythm: Normal rate and regular rhythm.      Pulses: Normal pulses.      Heart sounds: Normal heart sounds.   Pulmonary:      Effort: Pulmonary effort is normal.      Breath sounds: Normal breath sounds.   Musculoskeletal:      Right lower leg: No edema.      Left lower leg: No edema.   Skin:     General: Skin is warm and dry.   Neurological:      General: No focal deficit present.      Mental Status: She is alert. She is disoriented and confused.   Psychiatric:         Mood and Affect: Mood normal.         Behavior: Behavior normal.         Cognition and Memory: Cognition is impaired. Memory is impaired. She exhibits impaired recent memory and impaired remote memory.         " Judgment: Judgment is inappropriate.        Result Review :   The following data was reviewed by: AUDREY Reynolds on 01/25/2022:  CMP    CMP 9/18/21 9/19/21 11/20/21   Glucose 116 (A) 118 (A) 134 (A)   BUN 15 11 15   Creatinine 1.01 (A) 0.83 1.08 (A)   eGFR Non  Am 55 (A) 69 51 (A)   Sodium 140 137 140   Potassium 4.2 3.9 4.2   Chloride 104 103 100   Calcium 9.6 9.1 9.4   Albumin 4.00  4.10   Total Bilirubin 0.3  0.4   Alkaline Phosphatase 77  91   AST (SGOT) 21  20   ALT (SGPT) 14  15   (A) Abnormal value            CBC    CBC 9/18/21 9/19/21 11/20/21   WBC 7.32 7.04 7.17   RBC 4.38 4.28 4.34   Hemoglobin 13.8 13.4 13.7   Hematocrit 43.1 41.8 41.4   MCV 98.4 (A) 97.7 (A) 95.4   MCH 31.5 31.3 31.6   MCHC 32.0 32.1 33.1   RDW 13.2 13.3 13.2   Platelets 256 231 257   (A) Abnormal value                      Ear Cerumen Removal    Date/Time: 1/25/2022 2:39 PM  Performed by: Rabia Logan APRN  Authorized by: Rabia Logan APRN     Anesthesia:  Local Anesthetic: none  Location details: left ear and right ear  Patient tolerance: patient tolerated the procedure well with no immediate complications  Procedure type: irrigation   Sedation:  Patient sedated: no              Past Surgical History:   Procedure Laterality Date   • BACK SURGERY     • BLADDER REPAIR  2000   • COLONOSCOPY  06/2020    POLYP   • CYSTOSCOPY  05/28/2020   • HYSTERECTOMY      partial   • LAMINECTOMY  1989    L5-S1   • TUBAL ABDOMINAL LIGATION  1979      Family History   Problem Relation Age of Onset   • Hypertension Mother    • Cancer Mother    • Heart disease Father    • Hypertension Father    • Diabetes Father    • Cancer Father    • Lung cancer Brother    • Stomach cancer Other         Aunt   • Lung cancer Other         uncle        Current Outpatient Medications:   •  diphenoxylate-atropine (LOMOTIL) 2.5-0.025 MG per tablet, Take 1 tablet by mouth 2 (Two) Times a Day As Needed for Diarrhea., Disp: 60 tablet,  Rfl: 2  •  donepezil (ARICEPT) 23 MG tablet, Take 1 tablet by mouth Every Night., Disp: 90 tablet, Rfl: 1  •  estradiol (CLIMARA) 0.025 MG/24HR patch, Place 1 patch on the skin as directed by provider 1 (One) Time Per Week., Disp: , Rfl:   •  hydrOXYzine (ATARAX) 10 MG tablet, Take 1 tablet by mouth 2 (Two) Times a Day As Needed for Itching., Disp: 180 tablet, Rfl: 1  •  levothyroxine (SYNTHROID, LEVOTHROID) 50 MCG tablet, Take 1 tablet by mouth Daily., Disp: 90 tablet, Rfl: 1  •  memantine (Namenda) 10 MG tablet, Take 1 tablet by mouth 2 (Two) Times a Day., Disp: 180 tablet, Rfl: 1  •  Mirabegron ER (Myrbetriq) 50 MG tablet sustained-release 24 hour 24 hr tablet, Take 50 mg by mouth Daily. Indications: lot [id number] exp 10/23 1 box, Disp: 28 tablet, Rfl: 0  •  nitrofurantoin, macrocrystal-monohydrate, (Macrobid) 100 MG capsule, Take 1 capsule by mouth Daily., Disp: 90 capsule, Rfl: 0  •  vitamin D (ERGOCALCIFEROL) 1.25 MG (99345 UT) capsule capsule, Take 1 capsule by mouth 1 (One) Time Per Week., Disp: 12 capsule, Rfl: 1  No current facility-administered medications for this visit.    Facility-Administered Medications Ordered in Other Visits:   •  sucralfate (CARAFATE) tablet 1 g, 1 g, Oral, Once, Carlo Dukes MD   Assessment and Plan    Diagnoses and all orders for this visit:    1. Bilateral impacted cerumen (Primary)  Comments:  ear irrigation performed for impacted cerumun b/l ears-pt tolerated the procedure well-instructed to use debrox prior to next appt for repeat irrigation  Orders:  -     Ear Cerumen Removal    2. Drug-induced constipation  Comments:  she is only having to take one lomotil daily to control the diarrhea.     3. Anxiety  Comments:  increased her dose of hydroxyzine from qd to bid.   Orders:  -     Discontinue: hydrOXYzine (ATARAX) 10 MG tablet; Take 1 tablet by mouth every night at bedtime.  Dispense: 90 tablet; Refill: 1  -     hydrOXYzine (ATARAX) 10 MG tablet; Take 1 tablet by  mouth 2 (Two) Times a Day As Needed for Itching.  Dispense: 180 tablet; Refill: 1    4. Hypothyroidism, unspecified type  -     levothyroxine (SYNTHROID, LEVOTHROID) 50 MCG tablet; Take 1 tablet by mouth Daily.  Dispense: 90 tablet; Refill: 1    5. OAB (overactive bladder)  Comments:  gave pt sample of Mybetriq  Orders:  -     Mirabegron ER (Myrbetriq) 50 MG tablet sustained-release 24 hour 24 hr tablet; Take 50 mg by mouth Daily. Indications: lot [id number] exp 10/23 1 box  Dispense: 28 tablet; Refill: 0    6. Late onset Alzheimer's dementia without behavioral disturbance (HCC)  Comments:  worsening dementia -her  is now having to do all her ADLs-she is maxed on her doses of Namenda and Aricept        Follow Up   No follow-ups on file.  Patient was given instructions and counseling regarding her condition or for health maintenance advice. Please see specific information pulled into the AVS if appropriate.     Marissa Rob  reports that she has never smoked. She has never used smokeless tobacco..                Rabia Logan, APRN

## 2022-01-30 RX ORDER — HYDROXYZINE HYDROCHLORIDE 10 MG/1
10 TABLET, FILM COATED ORAL 2 TIMES DAILY PRN
Qty: 180 TABLET | Refills: 1 | Status: SHIPPED | OUTPATIENT
Start: 2022-01-30 | End: 2022-04-26

## 2022-02-09 DIAGNOSIS — F02.80 ALZHEIMER'S DEMENTIA WITHOUT BEHAVIORAL DISTURBANCE, UNSPECIFIED TIMING OF DEMENTIA ONSET: ICD-10-CM

## 2022-02-09 DIAGNOSIS — G30.9 ALZHEIMER'S DEMENTIA WITHOUT BEHAVIORAL DISTURBANCE, UNSPECIFIED TIMING OF DEMENTIA ONSET: ICD-10-CM

## 2022-02-09 RX ORDER — MEMANTINE HYDROCHLORIDE 10 MG/1
10 TABLET ORAL 2 TIMES DAILY
Qty: 180 TABLET | Refills: 1 | Status: SHIPPED | OUTPATIENT
Start: 2022-02-09

## 2022-02-21 DIAGNOSIS — J06.9 UPPER RESPIRATORY TRACT INFECTION, UNSPECIFIED TYPE: Primary | ICD-10-CM

## 2022-02-21 RX ORDER — AZITHROMYCIN 250 MG/1
TABLET, FILM COATED ORAL
Qty: 6 TABLET | Refills: 0 | Status: SHIPPED | OUTPATIENT
Start: 2022-02-21 | End: 2022-04-26

## 2022-04-06 ENCOUNTER — TELEPHONE (OUTPATIENT)
Dept: FAMILY MEDICINE CLINIC | Facility: CLINIC | Age: 67
End: 2022-04-06

## 2022-04-06 NOTE — TELEPHONE ENCOUNTER
Caller: RUTHANN DORAN    Relationship: DAUGHTER    Best call back number: [phone]    What is the best time to reach you: ANY    Who are you requesting to speak with (clinical staff, provider,  specific staff member): CLINICAL STAFF    What was the call regarding: PATIENTS DAUGHTER CALLED WANTING TO LET JOSELYN KNOW THAT HER MOTHER HAS BEEN HAVING FREQUENT HALLUCINATIONS. SHE WOULD LIKE TO SPEAK TO HER OR IF THE OFFICE WANTED TO CALL HER FATHER THAT WOULD WORK TOO.    Do you require a callback: YES

## 2022-04-07 DIAGNOSIS — F02.818 LATE ONSET ALZHEIMER'S DEMENTIA WITH BEHAVIORAL DISTURBANCE: Primary | ICD-10-CM

## 2022-04-07 DIAGNOSIS — G30.1 LATE ONSET ALZHEIMER'S DEMENTIA WITH BEHAVIORAL DISTURBANCE: Primary | ICD-10-CM

## 2022-04-07 DIAGNOSIS — F05 SUNDOWN SYNDROME: ICD-10-CM

## 2022-04-07 NOTE — TELEPHONE ENCOUNTER
Called and spoke w/daughter Yvette-she is requesting a referral be placed for pallitative care through Hospice-I called and spoke with them and they are just needing the referral faxed over-the order has already been placed-please also fax progress notes, demo, insurance along with the referral to [phone] make sure to put it is for pallitative care only

## 2022-04-26 ENCOUNTER — OFFICE VISIT (OUTPATIENT)
Dept: FAMILY MEDICINE CLINIC | Facility: CLINIC | Age: 67
End: 2022-04-26

## 2022-04-26 VITALS
HEIGHT: 69 IN | DIASTOLIC BLOOD PRESSURE: 78 MMHG | BODY MASS INDEX: 27.25 KG/M2 | HEART RATE: 78 BPM | WEIGHT: 184 LBS | OXYGEN SATURATION: 96 % | SYSTOLIC BLOOD PRESSURE: 116 MMHG

## 2022-04-26 DIAGNOSIS — F02.818 LATE ONSET ALZHEIMER'S DEMENTIA WITH BEHAVIORAL DISTURBANCE: ICD-10-CM

## 2022-04-26 DIAGNOSIS — G30.1 LATE ONSET ALZHEIMER'S DEMENTIA WITH BEHAVIORAL DISTURBANCE: ICD-10-CM

## 2022-04-26 DIAGNOSIS — N32.81 OAB (OVERACTIVE BLADDER): ICD-10-CM

## 2022-04-26 DIAGNOSIS — G47.00 INSOMNIA, UNSPECIFIED TYPE: ICD-10-CM

## 2022-04-26 DIAGNOSIS — F41.9 ANXIETY: Primary | ICD-10-CM

## 2022-04-26 PROCEDURE — 99214 OFFICE O/P EST MOD 30 MIN: CPT | Performed by: NURSE PRACTITIONER

## 2022-04-26 RX ORDER — TRAZODONE HYDROCHLORIDE 50 MG/1
50 TABLET ORAL NIGHTLY
Qty: 30 TABLET | Refills: 0 | Status: SHIPPED | OUTPATIENT
Start: 2022-04-26 | End: 2022-05-19

## 2022-04-26 RX ORDER — HYDROXYZINE HYDROCHLORIDE 25 MG/1
25 TABLET, FILM COATED ORAL 3 TIMES DAILY PRN
Qty: 90 TABLET | Refills: 0 | Status: SHIPPED | OUTPATIENT
Start: 2022-04-26

## 2022-04-26 NOTE — PROGRESS NOTES
Chief Complaint  Alzheimer's Disease (Aricept, Namenda- patient tolerates this well ), Anxiety (Hydroxyzine- patient is needing somehting to help her sleep at night), Hypothyroidism (Levothyroxine- patient is compliant with taking in the morning on an empty stomach with water only .), Overactive Bladder (Myrbetriq- this is not working well, she is leaking and is now incontinent. ), and Vitamin D Deficiency (Vitamin D once a week. Patient is compliant with this. )    Subjective          Marissa Isabel Rob presents to [facility]  History of Present Illness    The patient presents today for a follow-up. She is accompanied by her  who contributes to her history.    Dementia - Her  states that he needs someone 2 days per week because he has been taking care of her 24/7 for several years. She watches game shows all day. She takes Namenda.    Loss of appetite - Her  states that the patient's appetite has been decreasing in the last few weeks, and she is not eating well. Her  states the patient gets restless, and wants to get out, so he takes her out for ice cream.     Palliative care - He states palliative care will be coming next month to perform an evaluation on the patient.    Insomnia - Her  states that sundowners is now affecting her at nighttime when she is asleep. She will wake him up 2 or 3 times to make sure he is still there. He adds that he tried to give her clonazepam, and then tried Ambien to help her sleep, but they have not helped. She does not nap during the day.     Anxiety - She is taking hydroxyzine 20mg q hs-increased dose to 25mg tid prn.     Constipation - She no longer takes the Lomotil for diarrhea, and has been experiencing constipation. Her bowels are now regular, and she does not have stool incontinence.     Overactive bladder - She does have urinary urgency with mild urine leakage. She has been taking Myrbetriq samples.    I  certify that the Marissa Rob is under my care and that I, or a nurse practitioner or a clinical nurse specialist or a physician's assistant working with me, had a Face to Face encounter that meets the physician face-to-face encounter requirements with the patient on 4/26/2022.    Wheelchair:  1. Does the patient have a mobility limitation that impairs the ability to perform Mobility Related Activities of Daily Living (MRADLs) that can be safely resolved with the use of a walker? no  2.Will the mobility limitation be significantly improved with the use of the walker that is being ordered and has a cane or crutches been ruled out? no  3. Will the patient’s home provide adequate access between rooms, maneuvering space, and surfaces for use of the manual wheelchair that is being provided? yes  4. Does the patient have willingness, sufficient upper extremity function and other physical and mental capabilities needed to safely propel the wheelchair or a caregiver who is available to provide assistance with the wheelchair? yes  5. Does the patient require a Jeanmarie-wheelchair, seat height of less than 19”, due to short stature or to enable the patient to place feet on ground for propulsion? no  6.  Is a lightweight wheelchair being ordered because the patient cannot self propel a standard wheelchair in the home? yes  7. Does the patient have a musculoskeletal condition or a cast / brace that prevents 90 flexion at the knee or significant edema? no  8. Does the patient have weak upper body muscles, upper body instability or muscle spasticity which requires the use of a safety belt/pelvic strap for proper positioning. no    Wheelchair accessories:  1. Does the patient meet coverage criteria for a manual wheelchair or a power wheelchair with a sling/solid seat/back? no  2. Does the patient have a current pressure ulcer or past history of a pressure ulcer on the area of contact with the seating surface? no  3. Does the  patient have absent or impaired sensation in the area of contact with the seating surface or the inability to carry out a functional weight shift due to one of the following diagnoses?: spinal cord injury resulting in quadriplegia or paraplegia, other spinal cord disease, multiple sclerosis, other demyelinating disease, cerebral palsy, anterior horn cell diseases including amyotrophic lateral sclerosis, post polio paralysis, traumatic brain injury resulting in quadriplegia, spina bifida, childhood cerebral degeneration, Alzheimer's disease, Parkinson's disease, muscular dystrophy, hemiplegia, [place]'s chorea, idiopathic torsion dystonia, athetoid cerebral palsy. yes  4. Does the patient have any significant postural asymmetries that are due to one of the diagnoses listed in question 3 above or to one of the following diagnoses?: monoplegia of the lower limb due to stroke, traumatic brain injury, or other etiology, spinocerebellar disease, above knee leg amputation, osteogenesis imperfecta, transverse myelitis. no    Does the patient need a Foam-General Use Seat Cushion? no  Does the patient need a Skin Protection cushion? no  Does the patient need a Positioning seat back? no  Does the patient need a Skin protection & Positioning cushion or seat back? no    Past Medical History:   Diagnosis Date   • Arthritis    • Bladder problem    • Cerebrovascular disease 06/11/2015   • Dementia (HCC) 07/23/2018    3yrs memory change, neuropsych test 2015.on aricept switched to memantine. MRI of brain revealed few MILD scattered FLAIR hyperintensities. Reluctant to attribute to memory issues. noted to have atrophy of the bitemporal region a component of Alzheimer's dementia. Refer for repeat neuropsych testing. titrate her memory/ 10 mg twice per day   • Hepatitis A    • Hypertension    • Hypomagnesemia 01/11/2016   • Hypothyroidism 05/09/2015   • Leg pain    • Memory loss 05/09/2015   • Microhematuria    • Muscle cramps    •  Obsessive compulsive disorder    • Pap smear for cervical cancer screening     no longer   • Postmenopausal syndrome 02/01/2017   • Thyroid disease    • Vascular dementia (HCC) 08/10/2015   • Vascular disease, peripheral (HCC)    • Vitamin D deficiency          Allergies   Allergen Reactions   • Levofloxacin Other (See Comments)     Joint pain   • Cefuroxime Axetil Rash   • Contrast Dye Rash   • Pravachol [Pravastatin] Other (See Comments)     muscle aches and cramping          Past Surgical History:   Procedure Laterality Date   • BACK SURGERY     • BLADDER REPAIR  2000   • COLONOSCOPY  06/2020    POLYP   • CYSTOSCOPY  05/28/2020   • HYSTERECTOMY      partial   • LAMINECTOMY  1989    L5-S1   • TUBAL ABDOMINAL LIGATION  1979          Social History     Tobacco Use   • Smoking status: Never Smoker   • Smokeless tobacco: Never Used   Substance Use Topics   • Alcohol use: Never     Comment: does not drink         Family History   Problem Relation Age of Onset   • Hypertension Mother    • Cancer Mother    • Heart disease Father    • Hypertension Father    • Diabetes Father    • Cancer Father    • Lung cancer Brother    • Stomach cancer Other         Aunt   • Lung cancer Other         uncle          Current Outpatient Medications on File Prior to Visit   Medication Sig   • levothyroxine (SYNTHROID, LEVOTHROID) 50 MCG tablet Take 1 tablet by mouth Daily.   • memantine (Namenda) 10 MG tablet Take 1 tablet by mouth 2 (Two) Times a Day.   • Mirabegron ER (Myrbetriq) 50 MG tablet sustained-release 24 hour 24 hr tablet Take 50 mg by mouth Daily. Indications: lot [id number] exp 10/23 1 box   • vitamin D (ERGOCALCIFEROL) 1.25 MG (58848 UT) capsule capsule Take 1 capsule by mouth 1 (One) Time Per Week.     Current Facility-Administered Medications on File Prior to Visit   Medication   • sucralfate (CARAFATE) tablet 1 g         Immunization History   Administered Date(s) Administered   • COVID-19 (PFIZER) PURPLE CAP 03/10/2021,  "04/01/2021, 10/04/2021   • Flu Vaccine Quad PF >36MO 10/08/2019   • Fluzone High-Dose 65+yrs 10/14/2021   • Fluzone Split Quad (Multi-dose) 10/12/2015, 10/17/2017, 10/15/2018, 10/02/2020   • Influenza TIV (IM) 10/01/2014   • Influenza, Unspecified 10/02/2020   • Pneumococcal Conjugate 13-Valent (PCV13) 10/30/2015   • Pneumococcal Polysaccharide (PPSV23) 10/17/2017         /78   Pulse 78   Ht 175.3 cm (69\")   Wt 83.5 kg (184 lb)   SpO2 96%   BMI 27.17 kg/m²             Physical Exam  Vitals reviewed.   Constitutional:       Appearance: Normal appearance. She is well-developed.   Neck:      Thyroid: No thyromegaly.      Vascular: No carotid bruit.   Cardiovascular:      Rate and Rhythm: Normal rate and regular rhythm.      Heart sounds: No murmur heard.    No friction rub. No gallop.   Pulmonary:      Effort: Pulmonary effort is normal.      Breath sounds: Normal breath sounds. No wheezing or rhonchi.   Musculoskeletal:      Right lower leg: No edema.      Left lower leg: No edema.   Neurological:      Mental Status: She is alert.      Cranial Nerves: No cranial nerve deficit.      Comments: Dementia.   Psychiatric:         Mood and Affect: Affect is inappropriate.         Behavior: Behavior is cooperative.         Cognition and Memory: Cognition is impaired. Memory is impaired. She exhibits impaired recent memory and impaired remote memory.         Judgment: Judgment is inappropriate.             Result Review :                Assessment and Plan      Diagnoses and all orders for this visit:    1. Anxiety (Primary)  -     hydrOXYzine (ATARAX) 25 MG tablet; Take 1 tablet by mouth 3 (Three) Times a Day As Needed for Itching.  Dispense: 90 tablet; Refill: 0  - She will continue taking hydroxyzine.    2. Insomnia, unspecified type  -     traZODone (DESYREL) 50 MG tablet; Take 1 tablet by mouth Every Night.  Dispense: 30 tablet; Refill: 0  - She can take trazadone up to 3 times per day to help with " sleep.                Follow Up     Return in about 4 weeks (around 5/24/2022).    Patient was given instructions and counseling regarding her condition or for health maintenance advice. Please see specific information pulled into the AVS if appropriate.       Transcribed from ambient dictation for AUDREY Reynolds by Judy Ramesh.  04/26/22   15:25 EDT    Patient verbalized consent to the visit recording.

## 2022-04-28 DIAGNOSIS — F02.80 ALZHEIMER'S DEMENTIA WITHOUT BEHAVIORAL DISTURBANCE, UNSPECIFIED TIMING OF DEMENTIA ONSET: ICD-10-CM

## 2022-04-28 DIAGNOSIS — G30.9 ALZHEIMER'S DEMENTIA WITHOUT BEHAVIORAL DISTURBANCE, UNSPECIFIED TIMING OF DEMENTIA ONSET: ICD-10-CM

## 2022-04-29 RX ORDER — DONEPEZIL HYDROCHLORIDE 23 MG/1
23 TABLET, FILM COATED ORAL NIGHTLY
Qty: 90 TABLET | Refills: 1 | Status: SHIPPED | OUTPATIENT
Start: 2022-04-29 | End: 2022-05-02 | Stop reason: SDUPTHER

## 2022-05-02 DIAGNOSIS — F02.80 ALZHEIMER'S DEMENTIA WITHOUT BEHAVIORAL DISTURBANCE, UNSPECIFIED TIMING OF DEMENTIA ONSET: ICD-10-CM

## 2022-05-02 DIAGNOSIS — G30.9 ALZHEIMER'S DEMENTIA WITHOUT BEHAVIORAL DISTURBANCE, UNSPECIFIED TIMING OF DEMENTIA ONSET: ICD-10-CM

## 2022-05-02 RX ORDER — DONEPEZIL HYDROCHLORIDE 23 MG/1
23 TABLET, FILM COATED ORAL NIGHTLY
Qty: 90 TABLET | Refills: 1 | Status: SHIPPED | OUTPATIENT
Start: 2022-05-02

## 2022-05-09 ENCOUNTER — TELEPHONE (OUTPATIENT)
Dept: FAMILY MEDICINE CLINIC | Facility: CLINIC | Age: 67
End: 2022-05-09

## 2022-05-09 NOTE — TELEPHONE ENCOUNTER
Caller: RUTHANN DORAN     Relationship to patient: DAUGHTER    Best call back number: RUTHANN DORAN     Patient is needing: RUTHANN STATED NEED FOR WHEELCHAIR AND WANTING TO MAKE SURE PATIENTS INSURANCE WOULD PAY FOR THIS. SHE IS UNSURE OF WHICH MEDICAL SUPPLY COMPANY TO USE IF THIS WILL BE OUT OF POCKET.

## 2022-05-09 NOTE — TELEPHONE ENCOUNTER
Lets put an order in for [employer] for a transport wheelchair. Can we call Yvette to see if it is for weakness? So we will know what dx to put on the order

## 2022-05-22 ENCOUNTER — HOSPITAL ENCOUNTER (INPATIENT)
Facility: HOSPITAL | Age: 67
LOS: 4 days | Discharge: HOSPICE | End: 2022-05-26
Attending: EMERGENCY MEDICINE | Admitting: INTERNAL MEDICINE

## 2022-05-22 ENCOUNTER — APPOINTMENT (OUTPATIENT)
Dept: GENERAL RADIOLOGY | Facility: HOSPITAL | Age: 67
End: 2022-05-22

## 2022-05-22 DIAGNOSIS — R26.2 DIFFICULTY WALKING: Primary | ICD-10-CM

## 2022-05-22 DIAGNOSIS — Z78.9 DECREASED ACTIVITIES OF DAILY LIVING (ADL): ICD-10-CM

## 2022-05-22 DIAGNOSIS — F03.91 DEMENTIA WITH BEHAVIORAL DISTURBANCE, UNSPECIFIED DEMENTIA TYPE: ICD-10-CM

## 2022-05-22 PROBLEM — R62.7 FAILURE TO THRIVE IN ADULT: Status: ACTIVE | Noted: 2022-05-22

## 2022-05-22 LAB
ALBUMIN SERPL-MCNC: 4 G/DL (ref 3.5–5.2)
ALBUMIN/GLOB SERPL: 1.1 G/DL
ALP SERPL-CCNC: 89 U/L (ref 39–117)
ALT SERPL W P-5'-P-CCNC: 10 U/L (ref 1–33)
ANION GAP SERPL CALCULATED.3IONS-SCNC: 12.3 MMOL/L (ref 5–15)
AST SERPL-CCNC: 21 U/L (ref 1–32)
BACTERIA UR QL AUTO: ABNORMAL /HPF
BASOPHILS # BLD AUTO: 0.05 10*3/MM3 (ref 0–0.2)
BASOPHILS NFR BLD AUTO: 0.6 % (ref 0–1.5)
BILIRUB SERPL-MCNC: 0.7 MG/DL (ref 0–1.2)
BILIRUB UR QL STRIP: NEGATIVE
BUN SERPL-MCNC: 15 MG/DL (ref 8–23)
BUN/CREAT SERPL: 14.2 (ref 7–25)
CALCIUM SPEC-SCNC: 9.7 MG/DL (ref 8.6–10.5)
CHLORIDE SERPL-SCNC: 100 MMOL/L (ref 98–107)
CLARITY UR: CLEAR
CO2 SERPL-SCNC: 24.7 MMOL/L (ref 22–29)
COLOR UR: YELLOW
CREAT SERPL-MCNC: 1.06 MG/DL (ref 0.57–1)
DEPRECATED RDW RBC AUTO: 45.4 FL (ref 37–54)
EGFRCR SERPLBLD CKD-EPI 2021: 57.7 ML/MIN/1.73
EOSINOPHIL # BLD AUTO: 0.15 10*3/MM3 (ref 0–0.4)
EOSINOPHIL NFR BLD AUTO: 1.8 % (ref 0.3–6.2)
ERYTHROCYTE [DISTWIDTH] IN BLOOD BY AUTOMATED COUNT: 12.9 % (ref 12.3–15.4)
GLOBULIN UR ELPH-MCNC: 3.5 GM/DL
GLUCOSE SERPL-MCNC: 124 MG/DL (ref 65–99)
GLUCOSE UR STRIP-MCNC: NEGATIVE MG/DL
HCT VFR BLD AUTO: 43.4 % (ref 34–46.6)
HGB BLD-MCNC: 14.2 G/DL (ref 12–15.9)
HGB UR QL STRIP.AUTO: NEGATIVE
HOLD SPECIMEN: NORMAL
HOLD SPECIMEN: NORMAL
HYALINE CASTS UR QL AUTO: ABNORMAL /LPF
IMM GRANULOCYTES # BLD AUTO: 0.04 10*3/MM3 (ref 0–0.05)
IMM GRANULOCYTES NFR BLD AUTO: 0.5 % (ref 0–0.5)
KETONES UR QL STRIP: NEGATIVE
LEUKOCYTE ESTERASE UR QL STRIP.AUTO: ABNORMAL
LIPASE SERPL-CCNC: 18 U/L (ref 13–60)
LYMPHOCYTES # BLD AUTO: 1.6 10*3/MM3 (ref 0.7–3.1)
LYMPHOCYTES NFR BLD AUTO: 19.7 % (ref 19.6–45.3)
MCH RBC QN AUTO: 31.4 PG (ref 26.6–33)
MCHC RBC AUTO-ENTMCNC: 32.7 G/DL (ref 31.5–35.7)
MCV RBC AUTO: 96 FL (ref 79–97)
MONOCYTES # BLD AUTO: 0.89 10*3/MM3 (ref 0.1–0.9)
MONOCYTES NFR BLD AUTO: 11 % (ref 5–12)
NEUTROPHILS NFR BLD AUTO: 5.39 10*3/MM3 (ref 1.7–7)
NEUTROPHILS NFR BLD AUTO: 66.4 % (ref 42.7–76)
NITRITE UR QL STRIP: NEGATIVE
NRBC BLD AUTO-RTO: 0 /100 WBC (ref 0–0.2)
PH UR STRIP.AUTO: 6 [PH] (ref 5–8)
PLATELET # BLD AUTO: 260 10*3/MM3 (ref 140–450)
PMV BLD AUTO: 8.9 FL (ref 6–12)
POTASSIUM SERPL-SCNC: 3.9 MMOL/L (ref 3.5–5.2)
PROT SERPL-MCNC: 7.5 G/DL (ref 6–8.5)
PROT UR QL STRIP: NEGATIVE
RBC # BLD AUTO: 4.52 10*6/MM3 (ref 3.77–5.28)
RBC # UR STRIP: ABNORMAL /HPF
REF LAB TEST METHOD: ABNORMAL
SODIUM SERPL-SCNC: 137 MMOL/L (ref 136–145)
SP GR UR STRIP: 1.02 (ref 1–1.03)
SQUAMOUS #/AREA URNS HPF: ABNORMAL /HPF
TSH SERPL DL<=0.05 MIU/L-ACNC: 0.77 UIU/ML (ref 0.27–4.2)
UROBILINOGEN UR QL STRIP: ABNORMAL
WBC # UR STRIP: ABNORMAL /HPF
WBC NRBC COR # BLD: 8.12 10*3/MM3 (ref 3.4–10.8)
WHOLE BLOOD HOLD COAG: NORMAL
WHOLE BLOOD HOLD SPECIMEN: NORMAL

## 2022-05-22 PROCEDURE — 81001 URINALYSIS AUTO W/SCOPE: CPT | Performed by: EMERGENCY MEDICINE

## 2022-05-22 PROCEDURE — 72050 X-RAY EXAM NECK SPINE 4/5VWS: CPT

## 2022-05-22 PROCEDURE — 82607 VITAMIN B-12: CPT | Performed by: INTERNAL MEDICINE

## 2022-05-22 PROCEDURE — 85025 COMPLETE CBC W/AUTO DIFF WBC: CPT

## 2022-05-22 PROCEDURE — P9612 CATHETERIZE FOR URINE SPEC: HCPCS

## 2022-05-22 PROCEDURE — 72170 X-RAY EXAM OF PELVIS: CPT

## 2022-05-22 PROCEDURE — 83690 ASSAY OF LIPASE: CPT

## 2022-05-22 PROCEDURE — 99222 1ST HOSP IP/OBS MODERATE 55: CPT | Performed by: INTERNAL MEDICINE

## 2022-05-22 PROCEDURE — 99284 EMERGENCY DEPT VISIT MOD MDM: CPT

## 2022-05-22 PROCEDURE — 73590 X-RAY EXAM OF LOWER LEG: CPT

## 2022-05-22 PROCEDURE — 25010000002 HEPARIN (PORCINE) PER 1000 UNITS: Performed by: INTERNAL MEDICINE

## 2022-05-22 PROCEDURE — 84443 ASSAY THYROID STIM HORMONE: CPT | Performed by: INTERNAL MEDICINE

## 2022-05-22 PROCEDURE — 80053 COMPREHEN METABOLIC PANEL: CPT

## 2022-05-22 RX ORDER — LORAZEPAM 2 MG/ML
1 INJECTION INTRAMUSCULAR EVERY 4 HOURS PRN
Status: DISCONTINUED | OUTPATIENT
Start: 2022-05-22 | End: 2022-05-24

## 2022-05-22 RX ORDER — ZOLPIDEM TARTRATE 5 MG/1
5 TABLET ORAL NIGHTLY PRN
Status: DISCONTINUED | OUTPATIENT
Start: 2022-05-22 | End: 2022-05-26 | Stop reason: HOSPADM

## 2022-05-22 RX ORDER — HEPARIN SODIUM 5000 [USP'U]/ML
5000 INJECTION, SOLUTION INTRAVENOUS; SUBCUTANEOUS EVERY 8 HOURS SCHEDULED
Status: DISCONTINUED | OUTPATIENT
Start: 2022-05-22 | End: 2022-05-26 | Stop reason: HOSPADM

## 2022-05-22 RX ORDER — TRAZODONE HYDROCHLORIDE 50 MG/1
50 TABLET ORAL NIGHTLY
Status: ON HOLD | COMMUNITY
End: 2022-05-23

## 2022-05-22 RX ORDER — ACETAMINOPHEN 325 MG/1
650 TABLET ORAL EVERY 6 HOURS PRN
Status: DISCONTINUED | OUTPATIENT
Start: 2022-05-22 | End: 2022-05-26 | Stop reason: HOSPADM

## 2022-05-22 RX ORDER — ONDANSETRON 2 MG/ML
4 INJECTION INTRAMUSCULAR; INTRAVENOUS EVERY 6 HOURS PRN
Status: DISCONTINUED | OUTPATIENT
Start: 2022-05-22 | End: 2022-05-26 | Stop reason: HOSPADM

## 2022-05-22 RX ORDER — SODIUM CHLORIDE 0.9 % (FLUSH) 0.9 %
10 SYRINGE (ML) INJECTION AS NEEDED
Status: DISCONTINUED | OUTPATIENT
Start: 2022-05-22 | End: 2022-05-26 | Stop reason: HOSPADM

## 2022-05-22 RX ADMIN — SODIUM CHLORIDE 1000 ML: 9 INJECTION, SOLUTION INTRAVENOUS at 19:41

## 2022-05-22 RX ADMIN — HEPARIN SODIUM 5000 UNITS: 5000 INJECTION, SOLUTION INTRAVENOUS; SUBCUTANEOUS at 23:42

## 2022-05-23 PROBLEM — E43 SEVERE MALNUTRITION (HCC): Status: ACTIVE | Noted: 2022-05-23

## 2022-05-23 LAB
FOLATE SERPL-MCNC: 11.4 NG/ML (ref 4.78–24.2)
VIT B12 BLD-MCNC: 482 PG/ML (ref 211–946)

## 2022-05-23 PROCEDURE — 97161 PT EVAL LOW COMPLEX 20 MIN: CPT

## 2022-05-23 PROCEDURE — 25010000002 HEPARIN (PORCINE) PER 1000 UNITS: Performed by: INTERNAL MEDICINE

## 2022-05-23 PROCEDURE — 82746 ASSAY OF FOLIC ACID SERUM: CPT | Performed by: INTERNAL MEDICINE

## 2022-05-23 PROCEDURE — 99233 SBSQ HOSP IP/OBS HIGH 50: CPT | Performed by: INTERNAL MEDICINE

## 2022-05-23 PROCEDURE — 25010000002 LORAZEPAM PER 2 MG: Performed by: INTERNAL MEDICINE

## 2022-05-23 RX ORDER — QUETIAPINE FUMARATE 25 MG/1
25 TABLET, FILM COATED ORAL NIGHTLY
COMMUNITY

## 2022-05-23 RX ORDER — AMLODIPINE BESYLATE 5 MG/1
5 TABLET ORAL
Status: DISCONTINUED | OUTPATIENT
Start: 2022-05-23 | End: 2022-05-26 | Stop reason: HOSPADM

## 2022-05-23 RX ORDER — DOXYCYCLINE HYCLATE 100 MG/1
100 CAPSULE ORAL DAILY
COMMUNITY

## 2022-05-23 RX ORDER — DONEPEZIL HYDROCHLORIDE 10 MG/1
20 TABLET, FILM COATED ORAL NIGHTLY
Refills: 1 | Status: DISCONTINUED | OUTPATIENT
Start: 2022-05-23 | End: 2022-05-26 | Stop reason: HOSPADM

## 2022-05-23 RX ORDER — ALPRAZOLAM 0.25 MG/1
0.25 TABLET ORAL ONCE AS NEEDED
Status: DISCONTINUED | OUTPATIENT
Start: 2022-05-23 | End: 2022-05-24

## 2022-05-23 RX ORDER — ALPRAZOLAM 0.5 MG/1
0.25 TABLET ORAL 3 TIMES DAILY PRN
COMMUNITY

## 2022-05-23 RX ORDER — LEVOTHYROXINE SODIUM 0.05 MG/1
50 TABLET ORAL
Status: DISCONTINUED | OUTPATIENT
Start: 2022-05-23 | End: 2022-05-26 | Stop reason: HOSPADM

## 2022-05-23 RX ORDER — QUETIAPINE FUMARATE 25 MG/1
25 TABLET, FILM COATED ORAL NIGHTLY
Status: DISCONTINUED | OUTPATIENT
Start: 2022-05-23 | End: 2022-05-23

## 2022-05-23 RX ORDER — LEVOTHYROXINE SODIUM 0.05 MG/1
50 TABLET ORAL DAILY
Status: DISCONTINUED | OUTPATIENT
Start: 2022-05-23 | End: 2022-05-23

## 2022-05-23 RX ORDER — MEMANTINE HYDROCHLORIDE 10 MG/1
10 TABLET ORAL 2 TIMES DAILY
Status: DISCONTINUED | OUTPATIENT
Start: 2022-05-23 | End: 2022-05-26 | Stop reason: HOSPADM

## 2022-05-23 RX ORDER — HYDROXYZINE HYDROCHLORIDE 25 MG/1
25 TABLET, FILM COATED ORAL 3 TIMES DAILY PRN
Status: DISCONTINUED | OUTPATIENT
Start: 2022-05-23 | End: 2022-05-26 | Stop reason: HOSPADM

## 2022-05-23 RX ADMIN — HEPARIN SODIUM 5000 UNITS: 5000 INJECTION, SOLUTION INTRAVENOUS; SUBCUTANEOUS at 15:28

## 2022-05-23 RX ADMIN — LORAZEPAM 1 MG: 2 INJECTION INTRAMUSCULAR; INTRAVENOUS at 11:55

## 2022-05-23 RX ADMIN — LEVOTHYROXINE SODIUM 50 MCG: 50 TABLET ORAL at 07:44

## 2022-05-23 RX ADMIN — AMLODIPINE BESYLATE 5 MG: 5 TABLET ORAL at 11:55

## 2022-05-23 RX ADMIN — MEMANTINE 10 MG: 10 TABLET ORAL at 10:19

## 2022-05-23 RX ADMIN — HEPARIN SODIUM 5000 UNITS: 5000 INJECTION, SOLUTION INTRAVENOUS; SUBCUTANEOUS at 06:29

## 2022-05-23 RX ADMIN — DONEPEZIL HYDROCHLORIDE 20 MG: 10 TABLET, FILM COATED ORAL at 21:24

## 2022-05-23 RX ADMIN — HEPARIN SODIUM 5000 UNITS: 5000 INJECTION, SOLUTION INTRAVENOUS; SUBCUTANEOUS at 21:24

## 2022-05-23 RX ADMIN — LORAZEPAM 1 MG: 2 INJECTION INTRAMUSCULAR; INTRAVENOUS at 06:29

## 2022-05-23 RX ADMIN — MEMANTINE 10 MG: 10 TABLET ORAL at 21:24

## 2022-05-24 LAB
027 TOXIN: NORMAL
C DIFF TOX GENS STL QL NAA+PROBE: NEGATIVE

## 2022-05-24 PROCEDURE — 99232 SBSQ HOSP IP/OBS MODERATE 35: CPT | Performed by: INTERNAL MEDICINE

## 2022-05-24 PROCEDURE — 25010000002 HEPARIN (PORCINE) PER 1000 UNITS: Performed by: INTERNAL MEDICINE

## 2022-05-24 PROCEDURE — 87493 C DIFF AMPLIFIED PROBE: CPT | Performed by: INTERNAL MEDICINE

## 2022-05-24 PROCEDURE — 97165 OT EVAL LOW COMPLEX 30 MIN: CPT

## 2022-05-24 RX ORDER — QUETIAPINE FUMARATE 25 MG/1
25 TABLET, FILM COATED ORAL NIGHTLY
Status: DISCONTINUED | OUTPATIENT
Start: 2022-05-24 | End: 2022-05-26 | Stop reason: HOSPADM

## 2022-05-24 RX ORDER — ALPRAZOLAM 0.25 MG/1
0.25 TABLET ORAL 3 TIMES DAILY PRN
Status: DISCONTINUED | OUTPATIENT
Start: 2022-05-24 | End: 2022-05-26 | Stop reason: HOSPADM

## 2022-05-24 RX ADMIN — HEPARIN SODIUM 5000 UNITS: 5000 INJECTION, SOLUTION INTRAVENOUS; SUBCUTANEOUS at 21:06

## 2022-05-24 RX ADMIN — HEPARIN SODIUM 5000 UNITS: 5000 INJECTION, SOLUTION INTRAVENOUS; SUBCUTANEOUS at 13:31

## 2022-05-24 RX ADMIN — QUETIAPINE FUMARATE 25 MG: 25 TABLET ORAL at 21:06

## 2022-05-24 RX ADMIN — MEMANTINE 10 MG: 10 TABLET ORAL at 21:06

## 2022-05-24 RX ADMIN — LEVOTHYROXINE SODIUM 50 MCG: 50 TABLET ORAL at 05:04

## 2022-05-24 RX ADMIN — AMLODIPINE BESYLATE 5 MG: 5 TABLET ORAL at 08:39

## 2022-05-24 RX ADMIN — HEPARIN SODIUM 5000 UNITS: 5000 INJECTION, SOLUTION INTRAVENOUS; SUBCUTANEOUS at 05:04

## 2022-05-24 RX ADMIN — ALPRAZOLAM 0.25 MG: 0.25 TABLET ORAL at 13:31

## 2022-05-24 RX ADMIN — MEMANTINE 10 MG: 10 TABLET ORAL at 08:39

## 2022-05-24 RX ADMIN — ACETAMINOPHEN 650 MG: 325 TABLET ORAL at 16:55

## 2022-05-24 RX ADMIN — DONEPEZIL HYDROCHLORIDE 20 MG: 10 TABLET, FILM COATED ORAL at 21:06

## 2022-05-25 PROCEDURE — 25010000002 HEPARIN (PORCINE) PER 1000 UNITS: Performed by: INTERNAL MEDICINE

## 2022-05-25 PROCEDURE — 99232 SBSQ HOSP IP/OBS MODERATE 35: CPT | Performed by: INTERNAL MEDICINE

## 2022-05-25 RX ADMIN — HEPARIN SODIUM 5000 UNITS: 5000 INJECTION, SOLUTION INTRAVENOUS; SUBCUTANEOUS at 05:17

## 2022-05-25 RX ADMIN — ACETAMINOPHEN 650 MG: 325 TABLET ORAL at 15:47

## 2022-05-25 RX ADMIN — MEMANTINE 10 MG: 10 TABLET ORAL at 10:15

## 2022-05-25 RX ADMIN — MEMANTINE 10 MG: 10 TABLET ORAL at 21:35

## 2022-05-25 RX ADMIN — AMLODIPINE BESYLATE 5 MG: 5 TABLET ORAL at 10:16

## 2022-05-25 RX ADMIN — LEVOTHYROXINE SODIUM 50 MCG: 50 TABLET ORAL at 05:17

## 2022-05-25 RX ADMIN — QUETIAPINE FUMARATE 25 MG: 25 TABLET ORAL at 21:35

## 2022-05-25 RX ADMIN — DONEPEZIL HYDROCHLORIDE 20 MG: 10 TABLET, FILM COATED ORAL at 21:34

## 2022-05-25 RX ADMIN — HEPARIN SODIUM 5000 UNITS: 5000 INJECTION, SOLUTION INTRAVENOUS; SUBCUTANEOUS at 21:34

## 2022-05-25 RX ADMIN — HEPARIN SODIUM 5000 UNITS: 5000 INJECTION, SOLUTION INTRAVENOUS; SUBCUTANEOUS at 14:22

## 2022-05-25 RX ADMIN — ALPRAZOLAM 0.25 MG: 0.25 TABLET ORAL at 14:22

## 2022-05-25 RX ADMIN — ZOLPIDEM TARTRATE 5 MG: 5 TABLET ORAL at 21:37

## 2022-05-26 VITALS
WEIGHT: 176.59 LBS | RESPIRATION RATE: 18 BRPM | DIASTOLIC BLOOD PRESSURE: 49 MMHG | BODY MASS INDEX: 26.76 KG/M2 | HEIGHT: 68 IN | TEMPERATURE: 98.4 F | SYSTOLIC BLOOD PRESSURE: 128 MMHG | HEART RATE: 84 BPM | OXYGEN SATURATION: 97 %

## 2022-05-26 PROCEDURE — 25010000002 HEPARIN (PORCINE) PER 1000 UNITS: Performed by: INTERNAL MEDICINE

## 2022-05-26 PROCEDURE — 99238 HOSP IP/OBS DSCHRG MGMT 30/<: CPT | Performed by: INTERNAL MEDICINE

## 2022-05-26 RX ADMIN — LEVOTHYROXINE SODIUM 50 MCG: 50 TABLET ORAL at 06:26

## 2022-05-26 RX ADMIN — HEPARIN SODIUM 5000 UNITS: 5000 INJECTION, SOLUTION INTRAVENOUS; SUBCUTANEOUS at 06:26

## 2022-05-26 RX ADMIN — MEMANTINE 10 MG: 10 TABLET ORAL at 08:06

## 2022-05-26 RX ADMIN — AMLODIPINE BESYLATE 5 MG: 5 TABLET ORAL at 08:06

## 2024-02-23 NOTE — PROGRESS NOTES
[facility]   Hospitalist Progress Note  Date: 2021  Patient Name: Marissa Rob  : 1955  MRN: [id number]  Date of admission: 2021    Subjective   Subjective     Chief Complaint:   Confusion    Summary:   Marissa Rob is a 66 y.o. female with history of dementia, remote CVA, hypertension, and hypothyroidism who has visited our emergency department 3 times in the past 2 days.  She was diagnosed with a urinary tract infection yesterday and was discharged home on Bactrim.  She lives at home with her  who typically cares for her.  Since she was discharged home after having a urinary tract infection on oral antibiotics, the patient has had several falls at home and the  is having difficulty caring for her at home given her falls, he brought her back to the emergency department.     At time of my evaluation the patient states that she still has some suprapubic pain.  She states that she feels chilled.  Otherwise no acute complaints.     In the emergency department, patient's vitals were stable.  Laboratory evaluation revealed lactic acidosis of 2.3.  UA still concerning for infection.  Chest x-ray and head CT clear.  Because the above, the hospitalist team was contacted to admit the patient for further management.    Interval Followup:   No acute events overnight, patient tolerating antibiotic therapy, patient still with confusion    Review of Systems  Denies nausea vomiting or diarrhea  No chest pain no palpitations no shortness of breath  No fever no chills    Objective   Objective     Vitals:   Temp:  [98 °F (36.7 °C)-98.5 °F (36.9 °C)] 98.5 °F (36.9 °C)  Heart Rate:  [60-84] 74  Resp:  [14-20] 18  BP: (121-161)/(43-73) 132/52    Physical Exam   General appearance: NAD, conversant, confused  Eyes: anicteric sclerae, moist conjunctivae; no lid-lag; PERRLA  HENT: Atraumatic; oropharynx clear with moist mucous membranes and no mucosal ulcerations; normal hard and soft  Live well message sent.    palate  Neck: Trachea midline; FROM, supple, no thyromegaly or lymphadenopathy  Lungs: CTA, with normal respiratory effort and no intercostal retractions  CV: Regular Rate and Rhythm, no Murmurs, Rubs, or Gallops   Abdomen: Soft, non-tender; no masses or Heptosplenomegally  Extremities: No peripheral edema or extremity lymphadenopathy  Skin: Normal temperature, turgor and texture; no rash, ulcers or subcutaneous nodules  Psych: Appropriate affect, alert and oriented to person, place and time, seems confused  Neuro: CN II - XII intact no motor deficits, no sensory defecits    Result Review    Result Review:  I have personally reviewed the results from the time of this admission to 9/9/2021 13:01 EDT and agree with these findings:  [x]  Laboratory  [x]  Microbiology  [x]  Radiology  [x]  EKG/Telemetry   []  Cardiology/Vascular   []  Pathology  []  Old records  []  Other:    Assessment/Plan   Assessment / Plan     Assessment:  Urinary tract infection  Metabolic encephalopathy due to above  Vascular dementia  Remote CVA  Essential hypertension  Hypothyroidism     Plan:    Admit to hospitalist service  Labs and imaging reviewed  Continue Zosyn, urine cultures pending  Continue IV fluids  Discontinue the Bactrim that the patient was discharged home with yesterday  Continue with fall precautions  Continue bladder scans, patient voided without difficulty this a.m., continue as needed intermittent catheterization  Resume appropriate home medications  Clinical course will dictate further management     Patient's clinical course will dictate further management  Discussed with ED physician, RN        Telemetry: Reviewed, sinus rhythm rate in the 70s    Reviewed patients labs and imaging, and discussed with patient and nurse at bedside.    DVT prophylaxis:  Mechanical DVT prophylaxis orders are present.    CODE STATUS:   Code Status: CPR  Medical Interventions (Level of Support Prior to Arrest): Full        Electronically signed  by Ok Boyd MD, 09/09/21, 1:01 PM EDT.